# Patient Record
Sex: MALE | Race: WHITE | Employment: OTHER | URBAN - METROPOLITAN AREA
[De-identification: names, ages, dates, MRNs, and addresses within clinical notes are randomized per-mention and may not be internally consistent; named-entity substitution may affect disease eponyms.]

---

## 2018-04-03 PROBLEM — I34.0 MR (MITRAL REGURGITATION): Status: ACTIVE | Noted: 2018-04-03

## 2018-04-03 PROBLEM — C44.310 BASAL CELL CARCINOMA OF SKIN OF FACE: Status: ACTIVE | Noted: 2018-04-03

## 2018-04-03 PROBLEM — L65.9 ALOPECIA: Status: ACTIVE | Noted: 2018-04-03

## 2018-04-03 PROBLEM — E78.5 HYPERLIPEMIA: Status: ACTIVE | Noted: 2018-04-03

## 2018-04-03 PROBLEM — I35.1 AR (AORTIC REGURGITATION): Status: ACTIVE | Noted: 2018-04-03

## 2018-04-03 PROBLEM — I25.10 CORONARY ATHEROSCLEROSIS: Status: ACTIVE | Noted: 2018-04-03

## 2018-04-03 PROBLEM — H35.30 MACULAR DEGENERATION: Status: ACTIVE | Noted: 2018-04-03

## 2018-04-03 PROBLEM — K21.9 GERD (GASTROESOPHAGEAL REFLUX DISEASE): Status: ACTIVE | Noted: 2018-04-03

## 2018-04-03 PROBLEM — J45.909 ASTHMA: Status: ACTIVE | Noted: 2018-04-03

## 2018-06-28 ENCOUNTER — HOSPITAL ENCOUNTER (OUTPATIENT)
Age: 68
Discharge: HOME | End: 2018-06-30
Payer: COMMERCIAL

## 2018-06-28 LAB
ALBUMIN SERPL-MCNC: 4.4 G/DL (ref 3.5–5.2)
ALP BLD-CCNC: 57 U/L (ref 40–129)
ALT SERPL-CCNC: 24 U/L (ref 0–40)
ANION GAP SERPL CALCULATED.3IONS-SCNC: 15 MMOL/L (ref 7–16)
AST SERPL-CCNC: 35 U/L (ref 0–39)
BASOPHILS ABSOLUTE: 0.07 E9/L (ref 0–0.2)
BASOPHILS RELATIVE PERCENT: 0.8 % (ref 0–2)
BILIRUB SERPL-MCNC: 1.9 MG/DL (ref 0–1.2)
BUN BLDV-MCNC: 22 MG/DL (ref 8–23)
CALCIUM SERPL-MCNC: 9.7 MG/DL (ref 8.6–10.2)
CHLORIDE BLD-SCNC: 106 MMOL/L (ref 98–107)
CHOLESTEROL, TOTAL: 172 MG/DL (ref 0–199)
CO2: 24 MMOL/L (ref 22–29)
CREAT SERPL-MCNC: 0.9 MG/DL (ref 0.7–1.2)
EOSINOPHILS ABSOLUTE: 0.78 E9/L (ref 0.05–0.5)
EOSINOPHILS RELATIVE PERCENT: 9.2 % (ref 0–6)
GFR AFRICAN AMERICAN: >60
GFR NON-AFRICAN AMERICAN: >60 ML/MIN/1.73
GLUCOSE BLD-MCNC: 81 MG/DL (ref 74–109)
HCT VFR BLD CALC: 45.1 % (ref 37–54)
HDLC SERPL-MCNC: 44 MG/DL
HEMOGLOBIN: 14.3 G/DL (ref 12.5–16.5)
IMMATURE GRANULOCYTES #: 0.04 E9/L
IMMATURE GRANULOCYTES %: 0.5 % (ref 0–5)
LDL CHOLESTEROL CALCULATED: 118 MG/DL (ref 0–99)
LYMPHOCYTES ABSOLUTE: 1.73 E9/L (ref 1.5–4)
LYMPHOCYTES RELATIVE PERCENT: 20.4 % (ref 20–42)
MCH RBC QN AUTO: 31.6 PG (ref 26–35)
MCHC RBC AUTO-ENTMCNC: 31.7 % (ref 32–34.5)
MCV RBC AUTO: 99.6 FL (ref 80–99.9)
MONOCYTES ABSOLUTE: 0.72 E9/L (ref 0.1–0.95)
MONOCYTES RELATIVE PERCENT: 8.5 % (ref 2–12)
NEUTROPHILS ABSOLUTE: 5.14 E9/L (ref 1.8–7.3)
NEUTROPHILS RELATIVE PERCENT: 60.6 % (ref 43–80)
PDW BLD-RTO: 13.5 FL (ref 11.5–15)
PLATELET # BLD: 174 E9/L (ref 130–450)
PMV BLD AUTO: 11.2 FL (ref 7–12)
POTASSIUM SERPL-SCNC: 4.4 MMOL/L (ref 3.5–5)
PROSTATE SPECIFIC ANTIGEN: 0.82 NG/ML (ref 0–4)
RBC # BLD: 4.53 E12/L (ref 3.8–5.8)
SEDIMENTATION RATE, ERYTHROCYTE: 18 MM/HR (ref 0–15)
SODIUM BLD-SCNC: 145 MMOL/L (ref 132–146)
TOTAL PROTEIN: 7.3 G/DL (ref 6.4–8.3)
TRIGL SERPL-MCNC: 51 MG/DL (ref 0–149)
VITAMIN D 25-HYDROXY: 31 NG/ML (ref 30–100)
VLDLC SERPL CALC-MCNC: 10 MG/DL
WBC # BLD: 8.5 E9/L (ref 4.5–11.5)

## 2018-06-28 PROCEDURE — 80053 COMPREHEN METABOLIC PANEL: CPT

## 2018-06-28 PROCEDURE — G0103 PSA SCREENING: HCPCS

## 2018-06-28 PROCEDURE — 85651 RBC SED RATE NONAUTOMATED: CPT

## 2018-06-28 PROCEDURE — 80061 LIPID PANEL: CPT

## 2018-06-28 PROCEDURE — 85025 COMPLETE CBC W/AUTO DIFF WBC: CPT

## 2018-06-28 PROCEDURE — 82306 VITAMIN D 25 HYDROXY: CPT

## 2019-02-04 ENCOUNTER — HOSPITAL ENCOUNTER (OUTPATIENT)
Age: 69
Discharge: HOME | End: 2019-02-04
Payer: MEDICARE

## 2019-02-04 DIAGNOSIS — E78.00 PURE HYPERCHOLESTEROLEMIA: ICD-10-CM

## 2019-02-04 DIAGNOSIS — I10 HYPERTENSION, UNSPECIFIED TYPE: ICD-10-CM

## 2019-02-04 LAB
ALBUMIN SERPL-MCNC: 4.3 G/DL (ref 3.5–5.2)
ALP BLD-CCNC: 58 U/L (ref 40–129)
ALT SERPL-CCNC: 20 U/L (ref 0–40)
ANION GAP SERPL CALCULATED.3IONS-SCNC: 11 MMOL/L (ref 7–16)
AST SERPL-CCNC: 22 U/L (ref 0–39)
BASOPHILS ABSOLUTE: 0.09 E9/L (ref 0–0.2)
BASOPHILS RELATIVE PERCENT: 0.9 % (ref 0–2)
BILIRUB SERPL-MCNC: 1 MG/DL (ref 0–1.2)
BUN BLDV-MCNC: 24 MG/DL (ref 8–23)
CALCIUM SERPL-MCNC: 9.5 MG/DL (ref 8.6–10.2)
CHLORIDE BLD-SCNC: 106 MMOL/L (ref 98–107)
CHOLESTEROL, TOTAL: 169 MG/DL (ref 0–199)
CO2: 27 MMOL/L (ref 22–29)
CREAT SERPL-MCNC: 1.1 MG/DL (ref 0.7–1.2)
EOSINOPHILS ABSOLUTE: 1.21 E9/L (ref 0.05–0.5)
EOSINOPHILS RELATIVE PERCENT: 12.3 % (ref 0–6)
GFR AFRICAN AMERICAN: >60
GFR NON-AFRICAN AMERICAN: >60 ML/MIN/1.73
GLUCOSE BLD-MCNC: 98 MG/DL (ref 74–99)
HCT VFR BLD CALC: 44.2 % (ref 37–54)
HDLC SERPL-MCNC: 42 MG/DL
HEMOGLOBIN: 14.4 G/DL (ref 12.5–16.5)
IMMATURE GRANULOCYTES #: 0.04 E9/L
IMMATURE GRANULOCYTES %: 0.4 % (ref 0–5)
LDL CHOLESTEROL CALCULATED: 115 MG/DL (ref 0–99)
LYMPHOCYTES ABSOLUTE: 2.38 E9/L (ref 1.5–4)
LYMPHOCYTES RELATIVE PERCENT: 24.3 % (ref 20–42)
MCH RBC QN AUTO: 31.9 PG (ref 26–35)
MCHC RBC AUTO-ENTMCNC: 32.6 % (ref 32–34.5)
MCV RBC AUTO: 97.8 FL (ref 80–99.9)
MONOCYTES ABSOLUTE: 0.86 E9/L (ref 0.1–0.95)
MONOCYTES RELATIVE PERCENT: 8.8 % (ref 2–12)
NEUTROPHILS ABSOLUTE: 5.22 E9/L (ref 1.8–7.3)
NEUTROPHILS RELATIVE PERCENT: 53.3 % (ref 43–80)
PDW BLD-RTO: 13.1 FL (ref 11.5–15)
PLATELET # BLD: 169 E9/L (ref 130–450)
PMV BLD AUTO: 10.3 FL (ref 7–12)
POTASSIUM SERPL-SCNC: 4.2 MMOL/L (ref 3.5–5)
RBC # BLD: 4.52 E12/L (ref 3.8–5.8)
SODIUM BLD-SCNC: 144 MMOL/L (ref 132–146)
TOTAL PROTEIN: 6.9 G/DL (ref 6.4–8.3)
TRIGL SERPL-MCNC: 62 MG/DL (ref 0–149)
VLDLC SERPL CALC-MCNC: 12 MG/DL
WBC # BLD: 9.8 E9/L (ref 4.5–11.5)

## 2019-02-04 PROCEDURE — 80053 COMPREHEN METABOLIC PANEL: CPT

## 2019-02-04 PROCEDURE — 85025 COMPLETE CBC W/AUTO DIFF WBC: CPT

## 2019-02-04 PROCEDURE — 80061 LIPID PANEL: CPT

## 2019-02-04 PROCEDURE — 36415 COLL VENOUS BLD VENIPUNCTURE: CPT

## 2019-05-05 ENCOUNTER — HOSPITAL ENCOUNTER (EMERGENCY)
Age: 69
Discharge: HOME | End: 2019-05-05
Payer: MEDICARE

## 2019-05-05 VITALS
BODY MASS INDEX: 24.33 KG/M2 | SYSTOLIC BLOOD PRESSURE: 131 MMHG | OXYGEN SATURATION: 93 % | RESPIRATION RATE: 16 BRPM | WEIGHT: 155 LBS | TEMPERATURE: 97.9 F | HEIGHT: 67 IN | DIASTOLIC BLOOD PRESSURE: 67 MMHG | HEART RATE: 83 BPM

## 2019-05-05 DIAGNOSIS — S51.812A LACERATION OF SKIN OF LEFT FOREARM, INITIAL ENCOUNTER: Primary | ICD-10-CM

## 2019-05-05 PROCEDURE — 99282 EMERGENCY DEPT VISIT SF MDM: CPT

## 2019-05-05 PROCEDURE — 12002 RPR S/N/AX/GEN/TRNK2.6-7.5CM: CPT

## 2019-05-05 RX ORDER — SULFAMETHOXAZOLE AND TRIMETHOPRIM 800; 160 MG/1; MG/1
1 TABLET ORAL 2 TIMES DAILY
Qty: 20 TABLET | Refills: 0 | Status: SHIPPED | OUTPATIENT
Start: 2019-05-05 | End: 2019-12-18 | Stop reason: ALTCHOICE

## 2019-05-05 RX ORDER — SULFAMETHOXAZOLE AND TRIMETHOPRIM 800; 160 MG/1; MG/1
1 TABLET ORAL ONCE
Status: DISCONTINUED | OUTPATIENT
Start: 2019-05-05 | End: 2019-05-05 | Stop reason: HOSPADM

## 2019-05-05 ASSESSMENT — PAIN SCALES - GENERAL: PAINLEVEL_OUTOF10: 5

## 2019-05-05 ASSESSMENT — PAIN DESCRIPTION - PAIN TYPE: TYPE: ACUTE PAIN

## 2019-05-05 ASSESSMENT — PAIN DESCRIPTION - LOCATION: LOCATION: ARM

## 2019-05-05 NOTE — ED PROVIDER NOTES
REPAIR  PROCEDURE NOTE:  Unless otherwise indicated, this procedure was done or directly supervised by the ED attending. Laceration #: 1. Location: left FA  Length: 6cm. flap  The wound area was cleansed with chlorhexidine gluconate and draped in a sterile fashion. Copious irrigation and care meticulously to make sure that his arm hair which is copious was out of the way. The wound area was anesthetized with Lidocaine 1% without epinephrine. WOUND COMPLEXITY:    Debridement: None. Undermining: None. Wound Margins Revised: None. Flaps Aligned: yes. The wound was explored with the following results no foreign body or tendon injury seen. The wound was closed with 5-0 Ethilon using interrupted sutures to anchor the large flap With Dermabond in between them  Dressing:  a sterile dressing and a bandage was placed. Total number suture: 8        COUNSELING:   I have spoken with the patient and discussed todays results, in addition to providing specific details for the plan of care and counseling regarding the diagnosis and prognosis.     --------------------------------------- IMPRESSION & DISPOSITION --------------------------------     IMPRESSION(s):  1. Laceration of skin of left forearm, initial encounter          DISPOSITION:  Disposition: Discharge to home. Patient condition is good. NOTE: This report was transcribed using voice recognition software. Every effort was made to ensure accuracy; however, inadvertent computerized transcription errors may be present.              VAL Hernández - CNP  05/05/19 8639

## 2020-02-02 PROBLEM — I71.20 THORACIC AORTIC ANEURYSM WITHOUT RUPTURE: Status: ACTIVE | Noted: 2020-02-02

## 2020-07-08 ENCOUNTER — APPOINTMENT (OUTPATIENT)
Dept: GENERAL RADIOLOGY | Age: 70
End: 2020-07-08
Payer: MEDICARE

## 2020-07-08 ENCOUNTER — HOSPITAL ENCOUNTER (EMERGENCY)
Age: 70
Discharge: ANOTHER HOSPITAL | End: 2020-07-08
Attending: EMERGENCY MEDICINE
Payer: MEDICARE

## 2020-07-08 VITALS
OXYGEN SATURATION: 97 % | BODY MASS INDEX: 24.59 KG/M2 | TEMPERATURE: 99 F | RESPIRATION RATE: 16 BRPM | HEART RATE: 124 BPM | SYSTOLIC BLOOD PRESSURE: 93 MMHG | DIASTOLIC BLOOD PRESSURE: 66 MMHG | WEIGHT: 157 LBS

## 2020-07-08 LAB
ANION GAP SERPL CALCULATED.3IONS-SCNC: 14 MMOL/L (ref 7–16)
BASOPHILS ABSOLUTE: 0.09 E9/L (ref 0–0.2)
BASOPHILS RELATIVE PERCENT: 0.7 % (ref 0–2)
BILIRUBIN URINE: NEGATIVE
BLOOD, URINE: NEGATIVE
BUN BLDV-MCNC: 14 MG/DL (ref 8–23)
CALCIUM SERPL-MCNC: 8.9 MG/DL (ref 8.6–10.2)
CHLORIDE BLD-SCNC: 95 MMOL/L (ref 98–107)
CLARITY: CLEAR
CO2: 25 MMOL/L (ref 22–29)
COLOR: YELLOW
CREAT SERPL-MCNC: 1 MG/DL (ref 0.7–1.2)
EOSINOPHILS ABSOLUTE: 0.88 E9/L (ref 0.05–0.5)
EOSINOPHILS RELATIVE PERCENT: 7 % (ref 0–6)
GFR AFRICAN AMERICAN: >60
GFR NON-AFRICAN AMERICAN: >60 ML/MIN/1.73
GLUCOSE BLD-MCNC: 107 MG/DL (ref 74–99)
GLUCOSE URINE: NEGATIVE MG/DL
HCT VFR BLD CALC: 33.7 % (ref 37–54)
HEMOGLOBIN: 10.6 G/DL (ref 12.5–16.5)
IMMATURE GRANULOCYTES #: 0.14 E9/L
IMMATURE GRANULOCYTES %: 1.1 % (ref 0–5)
KETONES, URINE: NEGATIVE MG/DL
LEUKOCYTE ESTERASE, URINE: NEGATIVE
LYMPHOCYTES ABSOLUTE: 1.51 E9/L (ref 1.5–4)
LYMPHOCYTES RELATIVE PERCENT: 12.1 % (ref 20–42)
MCH RBC QN AUTO: 30.5 PG (ref 26–35)
MCHC RBC AUTO-ENTMCNC: 31.5 % (ref 32–34.5)
MCV RBC AUTO: 97.1 FL (ref 80–99.9)
MONOCYTES ABSOLUTE: 1.37 E9/L (ref 0.1–0.95)
MONOCYTES RELATIVE PERCENT: 10.9 % (ref 2–12)
NEUTROPHILS ABSOLUTE: 8.53 E9/L (ref 1.8–7.3)
NEUTROPHILS RELATIVE PERCENT: 68.2 % (ref 43–80)
NITRITE, URINE: NEGATIVE
PDW BLD-RTO: 13.2 FL (ref 11.5–15)
PH UA: 7 (ref 5–9)
PLATELET # BLD: 327 E9/L (ref 130–450)
PMV BLD AUTO: 9.9 FL (ref 7–12)
POTASSIUM REFLEX MAGNESIUM: 4.6 MMOL/L (ref 3.5–5)
PRO-BNP: 1662 PG/ML (ref 0–125)
PROTEIN UA: NEGATIVE MG/DL
RBC # BLD: 3.47 E12/L (ref 3.8–5.8)
SODIUM BLD-SCNC: 134 MMOL/L (ref 132–146)
SPECIFIC GRAVITY UA: 1.01 (ref 1–1.03)
TROPONIN: 0.03 NG/ML (ref 0–0.03)
UROBILINOGEN, URINE: 0.2 E.U./DL
WBC # BLD: 12.5 E9/L (ref 4.5–11.5)

## 2020-07-08 PROCEDURE — 84484 ASSAY OF TROPONIN QUANT: CPT

## 2020-07-08 PROCEDURE — 80048 BASIC METABOLIC PNL TOTAL CA: CPT

## 2020-07-08 PROCEDURE — 87040 BLOOD CULTURE FOR BACTERIA: CPT

## 2020-07-08 PROCEDURE — 85025 COMPLETE CBC W/AUTO DIFF WBC: CPT

## 2020-07-08 PROCEDURE — 99285 EMERGENCY DEPT VISIT HI MDM: CPT

## 2020-07-08 PROCEDURE — 6370000000 HC RX 637 (ALT 250 FOR IP): Performed by: EMERGENCY MEDICINE

## 2020-07-08 PROCEDURE — 93005 ELECTROCARDIOGRAM TRACING: CPT | Performed by: EMERGENCY MEDICINE

## 2020-07-08 PROCEDURE — 71045 X-RAY EXAM CHEST 1 VIEW: CPT

## 2020-07-08 PROCEDURE — 81003 URINALYSIS AUTO W/O SCOPE: CPT

## 2020-07-08 PROCEDURE — 2500000003 HC RX 250 WO HCPCS: Performed by: EMERGENCY MEDICINE

## 2020-07-08 PROCEDURE — 83880 ASSAY OF NATRIURETIC PEPTIDE: CPT

## 2020-07-08 PROCEDURE — 96376 TX/PRO/DX INJ SAME DRUG ADON: CPT

## 2020-07-08 PROCEDURE — 2580000003 HC RX 258: Performed by: EMERGENCY MEDICINE

## 2020-07-08 PROCEDURE — 96374 THER/PROPH/DIAG INJ IV PUSH: CPT

## 2020-07-08 RX ORDER — MAGNESIUM OXIDE 400 MG/1
400 TABLET ORAL 2 TIMES DAILY
COMMUNITY
End: 2020-08-18 | Stop reason: ALTCHOICE

## 2020-07-08 RX ORDER — OXYCODONE HYDROCHLORIDE 5 MG/1
5 TABLET ORAL EVERY 6 HOURS PRN
COMMUNITY
End: 2020-07-14 | Stop reason: ALTCHOICE

## 2020-07-08 RX ORDER — ACETAMINOPHEN 500 MG
1000 TABLET ORAL ONCE
Status: COMPLETED | OUTPATIENT
Start: 2020-07-08 | End: 2020-07-08

## 2020-07-08 RX ORDER — COLCHICINE 0.6 MG/1
0.6 TABLET ORAL DAILY
COMMUNITY
End: 2020-07-14

## 2020-07-08 RX ORDER — METOPROLOL TARTRATE 5 MG/5ML
2.5 INJECTION INTRAVENOUS ONCE
Status: COMPLETED | OUTPATIENT
Start: 2020-07-08 | End: 2020-07-08

## 2020-07-08 RX ORDER — 0.9 % SODIUM CHLORIDE 0.9 %
500 INTRAVENOUS SOLUTION INTRAVENOUS ONCE
Status: COMPLETED | OUTPATIENT
Start: 2020-07-08 | End: 2020-07-08

## 2020-07-08 RX ORDER — ATORVASTATIN CALCIUM 10 MG/1
10 TABLET, FILM COATED ORAL NIGHTLY
COMMUNITY

## 2020-07-08 RX ORDER — ASPIRIN 81 MG/1
81 TABLET, CHEWABLE ORAL DAILY
COMMUNITY

## 2020-07-08 RX ORDER — ACETAMINOPHEN 500 MG
1000 TABLET ORAL EVERY 4 HOURS PRN
COMMUNITY
End: 2020-07-14

## 2020-07-08 RX ADMIN — METOPROLOL TARTRATE 2.5 MG: 5 INJECTION INTRAVENOUS at 11:40

## 2020-07-08 RX ADMIN — SODIUM CHLORIDE 500 ML: 9 INJECTION, SOLUTION INTRAVENOUS at 11:40

## 2020-07-08 RX ADMIN — METOPROLOL TARTRATE 2.5 MG: 5 INJECTION INTRAVENOUS at 13:13

## 2020-07-08 RX ADMIN — METOPROLOL TARTRATE 2.5 MG: 5 INJECTION INTRAVENOUS at 17:46

## 2020-07-08 RX ADMIN — ACETAMINOPHEN 1000 MG: 500 TABLET ORAL at 18:57

## 2020-07-08 ASSESSMENT — ENCOUNTER SYMPTOMS
BLOOD IN STOOL: 0
COUGH: 0
SINUS PRESSURE: 0
CONSTIPATION: 1
NAUSEA: 0
DIARRHEA: 0
EYE PAIN: 0
ABDOMINAL DISTENTION: 0
SORE THROAT: 0
VOMITING: 0
RHINORRHEA: 0
SHORTNESS OF BREATH: 0
EYE REDNESS: 0
ABDOMINAL PAIN: 0
WHEEZING: 0
EYE DISCHARGE: 0
BACK PAIN: 0

## 2020-07-08 ASSESSMENT — PAIN SCALES - GENERAL
PAINLEVEL_OUTOF10: 10
PAINLEVEL_OUTOF10: 3

## 2020-07-08 NOTE — ED PROVIDER NOTES
HPI  This is a 82 yo M with a PMHx significant for HTN, HLD, valvular disease (s/p replacement 6/2020), thoracic aortic aneurysm (s/p repair 6/2020), asthma and RA who presents with ongoing palpitations that have been refractory to treatment with beta blockers. The patient states that he has been in A Fib with RVR since his surgeries last month. He states he did not have A Fib prior to that. He states he was placed on Eliquis and Metoprolol which he has taken faithfully. The patient states he has been in contact with his physicians at [facility] (site of his surgeries) almost daily. Two days ago he states they told him to stop taking the Metoprolol because his heart rate was in the 130s-140s and was hypotensive with SBPs in the 80s. When he spoke to them today and told them those issues were still ongoing they told him to go to the nearest emergency room, prompting his visit today. The patient also endorses feeling fatigued and somewhat weak. The patient denies recent trauma, fever, chills, HA, dizziness, vision changes, congestion, rhinorrhea, neck pain, chest pain, hx of MI, SOB, cough, wheezing, abdominal pain, N/V/D/C, hematochezia, melena, dysuria, hematuria and paresthesias. The patient is currently taking Eliquis. The patient is a former smoker. He denies all alcohol and illicit drug use. The history is provided by the patient. Review of Systems   Constitutional: Positive for fatigue. Negative for chills, diaphoresis and fever. HENT: Negative for congestion, ear pain, postnasal drip, rhinorrhea, sinus pressure and sore throat. Eyes: Negative for pain, discharge and redness. Respiratory: Negative for cough, shortness of breath and wheezing. Cardiovascular: Positive for palpitations. Negative for chest pain and leg swelling. Gastrointestinal: Positive for constipation (intermittent). Negative for abdominal distention, abdominal pain, blood in stool, diarrhea, nausea and vomiting. Genitourinary: Negative for difficulty urinating, dysuria, frequency and hematuria. Musculoskeletal: Negative for arthralgias, back pain, myalgias and neck pain. Skin: Positive for wound (surgical). Negative for rash. Neurological: Negative for dizziness, syncope, facial asymmetry, speech difficulty, light-headedness, numbness and headaches. Hematological: Negative for adenopathy. All other systems reviewed and are negative. Physical Exam  Vitals signs and nursing note reviewed. Constitutional:       General: He is awake. He is not in acute distress. Appearance: He is ill-appearing. He is not diaphoretic. HENT:      Head: Normocephalic and atraumatic. Right Ear: External ear normal.      Left Ear: External ear normal.      Nose: Nose normal. No congestion or rhinorrhea. Mouth/Throat:      Mouth: Mucous membranes are dry. Pharynx: Oropharynx is clear. No oropharyngeal exudate or posterior oropharyngeal erythema. Eyes:      General: No scleral icterus. Right eye: No discharge. Left eye: No discharge. Extraocular Movements: Extraocular movements intact. Conjunctiva/sclera: Conjunctivae normal.      Pupils: Pupils are equal, round, and reactive to light. Neck:      Musculoskeletal: Normal range of motion and neck supple. No neck rigidity or muscular tenderness. Cardiovascular:      Rate and Rhythm: Tachycardia present. Rhythm irregular. Heart sounds: Murmur (soft AJMIE, II/VI, secondary to recent valve replacement) present. No friction rub. No gallop. Comments: Upper extremity and lower extremity distal pulses intact bilaterally +2/4  Pulmonary:      Effort: Pulmonary effort is normal. No respiratory distress. Breath sounds: Normal breath sounds. No wheezing, rhonchi or rales. Chest:      Chest wall: Tenderness (to mild palpation in the areas immediately adjacent to midline scar) present.    Abdominal:      General: Bowel sounds are normal. There is no distension. Palpations: Abdomen is soft. Tenderness: There is no abdominal tenderness. There is no guarding. Musculoskeletal: Normal range of motion. General: No tenderness or deformity. Right lower leg: No edema. Left lower leg: No edema. Lymphadenopathy:      Cervical: No cervical adenopathy. Skin:     General: Skin is warm and dry. Capillary Refill: Capillary refill takes less than 2 seconds. Findings: No erythema or rash. Comments: ~10 cm surgical wound/scar located in the midline of the chest - site of recent sternotomy - appears to be healing well with no drainage and no notable erythema   Neurological:      General: No focal deficit present. Mental Status: He is alert and oriented to person, place, and time. Sensory: No sensory deficit. Motor: No weakness. Coordination: Coordination normal.        Procedures:  No procedures performed. --------------------------------------------- PAST HISTORY ---------------------------------------------  Past Medical History:  has a past medical history of Aneurysm artery, pulmonary ([address] Asthma, Deviated septum, Hypercholesterolemia, Hypertension, Kidney stone, Restless leg syndrome, and Rheumatoid arthritis(714.0). Past Surgical History:  has a past surgical history that includes hernia repair (Left); cardiovascular stress test (1994); Tonsillectomy; hernia repair; Cataract removal (Bilateral, 2002); Colonoscopy; Diagnostic Cardiac Cath Lab Procedure; Vasectomy; Adenoidectomy; cyst removal; and skin biopsy (03/27/2018). Social History:  reports that he quit smoking about 44 years ago. He started smoking about 54 years ago. He has a 5.00 pack-year smoking history. He has never used smokeless tobacco. He reports that he does not drink alcohol or use drugs. Family History: family history is not on file. He was adopted.      The patients home medications have been reviewed.     Allergies: Statins [statins]    -------------------------------------------------- RESULTS -------------------------------------------------    LABS:  Results for orders placed or performed during the hospital encounter of 07/08/20   CBC Auto Differential   Result Value Ref Range    WBC 12.5 (H) 4.5 - 11.5 E9/L    RBC 3.47 (L) 3.80 - 5.80 E12/L    Hemoglobin 10.6 (L) 12.5 - 16.5 g/dL    Hematocrit 33.7 (L) 37.0 - 54.0 %    MCV 97.1 80.0 - 99.9 fL    MCH 30.5 26.0 - 35.0 pg    MCHC 31.5 (L) 32.0 - 34.5 %    RDW 13.2 11.5 - 15.0 fL    Platelets 606 029 - 941 E9/L    MPV 9.9 7.0 - 12.0 fL    Neutrophils % 68.2 43.0 - 80.0 %    Immature Granulocytes % 1.1 0.0 - 5.0 %    Lymphocytes % 12.1 (L) 20.0 - 42.0 %    Monocytes % 10.9 2.0 - 12.0 %    Eosinophils % 7.0 (H) 0.0 - 6.0 %    Basophils % 0.7 0.0 - 2.0 %    Neutrophils Absolute 8.53 (H) 1.80 - 7.30 E9/L    Immature Granulocytes # 0.14 E9/L    Lymphocytes Absolute 1.51 1.50 - 4.00 E9/L    Monocytes Absolute 1.37 (H) 0.10 - 0.95 E9/L    Eosinophils Absolute 0.88 (H) 0.05 - 0.50 E9/L    Basophils Absolute 0.09 0.00 - 0.20 K5/L   Basic Metabolic Panel w/ Reflex to MG   Result Value Ref Range    Sodium 134 132 - 146 mmol/L    Potassium reflex Magnesium 4.6 3.5 - 5.0 mmol/L    Chloride 95 (L) 98 - 107 mmol/L    CO2 25 22 - 29 mmol/L    Anion Gap 14 7 - 16 mmol/L    Glucose 107 (H) 74 - 99 mg/dL    BUN 14 8 - 23 mg/dL    CREATININE 1.0 0.7 - 1.2 mg/dL    GFR Non-African American >60 >=60 mL/min/1.73    GFR African American >60     Calcium 8.9 8.6 - 10.2 mg/dL   Brain Natriuretic Peptide   Result Value Ref Range    Pro-BNP 1,662 (H) 0 - 125 pg/mL   Troponin   Result Value Ref Range    Troponin 0.03 0.00 - 0.03 ng/mL   Urinalysis, reflex to microscopic   Result Value Ref Range    Color, UA Yellow Straw/Yellow    Clarity, UA Clear Clear    Glucose, Ur Negative Negative mg/dL    Bilirubin Urine Negative Negative    Ketones, Urine Negative Negative mg/dL    Specific below.    ED Course:    ED Course as of Jul 08 2030 Wed Jul 08, 2020   1108 This resident in to evaluate the patient. [ML]   1440 On reevaluation, the patient is resting comfortably in bed. I discussed our plan to transfer him back to [facility] which the patient and his wife are in agreement with. Call was placed to the patient's CT surgeon, Dr. Julee Neely. [ML]   1536 Discussed the case with Dr. Ld Multani of cardiology at [facility]. He agrees to accept the patient back to the original service that he was recently discharged from, CT surgery. Transfer center is aware. The patient will go by ambulance. He will be transferred when a bed becomes available. [ML]   3265 On reevaluation, the patient is resting comfortably in bed. I informed him that we discussed the case with cardiology at the [facility] and they agreed to accept him back. He is aware that he cannot be transferred until the [facility] has verified bed in place for him. He requested something to eat and drink which we will get for him now. [ML]      ED Course User Index  [ML] [place], [place]       8571: All results were discussed with the patient and I have provided specific details regarding the plan to admit the patient. The patient was without objective evidence of hemodynamic instability on transfer, though he remained in A Fib w/RVR. He was seen in the emergency department by myself and the assigned attending physician, Dr. Josie Savage, who agreed with the assessment, plan and decision to admit as laid out herein. The patient verbalized an understanding and agreement with the plan for transfer and admission to outside hospital. All questions were answered and the patient was deemed to be in stable condition at the time of transfer. MDM:  Patient presented from home with a primary complaint of palpitations. Recent open heart surgery at [facility] with patient in A Fib since the procedure's conclusion per notes and his recollection.  On arrival, he was tachycardic with remaining vital signs within normal limits. Physical exam was as documented above. DDX included, but was not limited to: PAF, ACS, PE, PTX, PNA, Sepsis, wound infection    Given the patient's clinical presentation, history and exam, the decision was made to further evaluate his complaints with EKG, labs and imaging. The patient's work-up revealed a slight leukocytosis, mild anemia and a modest increase in his proBNP. CXR noted. EKG was irregularly irregular as noted above. Trop within normal limits. The patient was given multiple doses of IV Lopressor, IVF and tylenol while in the ER with minimal improvement in his HR. Case was discussed with cardiology at [facility] who agreed the patient should be transferred there for further evaluation and management. The patient remained stable throughout his time in the ER. This patient's ED course included: a personal history and physicial examination, re-evaluation prior to disposition, multiple bedside re-evaluations, IV medications, cardiac monitoring and continuous pulse oximetry    Diagnosis:  1. Atrial fibrillation with RVR (HCC)        Disposition:  Patient's disposition: Transfer to [facility]  Mode of Transport: Ambulance  Patient's condition is stable.        Joann Barber DO  Resident  07/08/20 2304

## 2020-07-08 NOTE — ED NOTES
[facility]  E60 bed [address] [facility] Sp Castillo RN  07/08/20 9894

## 2020-07-09 LAB
EKG ATRIAL RATE: 129 BPM
EKG P AXIS: 44 DEGREES
EKG P-R INTERVAL: 180 MS
EKG Q-T INTERVAL: 312 MS
EKG QRS DURATION: 96 MS
EKG QTC CALCULATION (BAZETT): 457 MS
EKG R AXIS: -34 DEGREES
EKG T AXIS: 143 DEGREES
EKG VENTRICULAR RATE: 129 BPM

## 2020-07-09 PROCEDURE — 93010 ELECTROCARDIOGRAM REPORT: CPT | Performed by: INTERNAL MEDICINE

## 2020-07-13 LAB
BLOOD CULTURE, ROUTINE: NORMAL
CULTURE, BLOOD 2: NORMAL

## 2020-07-14 ENCOUNTER — HOSPITAL ENCOUNTER (OUTPATIENT)
Dept: GENERAL RADIOLOGY | Age: 70
Discharge: HOME | End: 2020-07-16
Payer: MEDICARE

## 2020-07-14 ENCOUNTER — HOSPITAL ENCOUNTER (OUTPATIENT)
Age: 70
Discharge: HOME | End: 2020-07-16
Payer: MEDICARE

## 2020-07-14 PROCEDURE — 71046 X-RAY EXAM CHEST 2 VIEWS: CPT

## 2020-07-23 ENCOUNTER — HOSPITAL ENCOUNTER (OUTPATIENT)
Age: 70
Discharge: HOME | End: 2020-07-23
Payer: MEDICARE

## 2020-07-23 PROCEDURE — 87088 URINE BACTERIA CULTURE: CPT

## 2020-07-25 LAB — URINE CULTURE, ROUTINE: NORMAL

## 2020-07-29 ENCOUNTER — HOSPITAL ENCOUNTER (OUTPATIENT)
Age: 70
Discharge: HOME | End: 2020-07-31
Payer: MEDICARE

## 2020-07-29 PROCEDURE — 87088 URINE BACTERIA CULTURE: CPT

## 2020-08-01 LAB — URINE CULTURE, ROUTINE: NORMAL

## 2020-08-11 ENCOUNTER — TELEPHONE (OUTPATIENT)
Dept: CARDIAC REHAB | Age: 70
End: 2020-08-11

## 2020-08-18 ENCOUNTER — HOSPITAL ENCOUNTER (OUTPATIENT)
Dept: CARDIAC REHAB | Age: 70
Setting detail: THERAPIES SERIES
Discharge: HOME | End: 2020-08-18
Payer: MEDICARE

## 2020-08-18 VITALS
SYSTOLIC BLOOD PRESSURE: 110 MMHG | RESPIRATION RATE: 16 BRPM | BODY MASS INDEX: 24.33 KG/M2 | OXYGEN SATURATION: 98 % | WEIGHT: 142.5 LBS | HEART RATE: 60 BPM | DIASTOLIC BLOOD PRESSURE: 63 MMHG | HEIGHT: 64 IN

## 2020-08-18 RX ORDER — TAMSULOSIN HYDROCHLORIDE 0.4 MG/1
0.4 CAPSULE ORAL NIGHTLY
COMMUNITY

## 2020-08-18 ASSESSMENT — PATIENT HEALTH QUESTIONNAIRE - PHQ9: SUM OF ALL RESPONSES TO PHQ QUESTIONS 1-9: 1

## 2020-08-21 ENCOUNTER — HOSPITAL ENCOUNTER (OUTPATIENT)
Dept: CARDIAC REHAB | Age: 70
Setting detail: THERAPIES SERIES
Discharge: HOME | End: 2020-08-21
Payer: MEDICARE

## 2020-08-21 PROCEDURE — 93798 PHYS/QHP OP CAR RHAB W/ECG: CPT

## 2020-08-24 ENCOUNTER — HOSPITAL ENCOUNTER (OUTPATIENT)
Dept: CARDIAC REHAB | Age: 70
Setting detail: THERAPIES SERIES
Discharge: HOME | End: 2020-08-24
Payer: MEDICARE

## 2020-08-24 PROCEDURE — 93798 PHYS/QHP OP CAR RHAB W/ECG: CPT

## 2020-08-26 ENCOUNTER — HOSPITAL ENCOUNTER (OUTPATIENT)
Dept: CARDIAC REHAB | Age: 70
Setting detail: THERAPIES SERIES
Discharge: HOME | End: 2020-08-26
Payer: MEDICARE

## 2020-08-26 PROCEDURE — 93798 PHYS/QHP OP CAR RHAB W/ECG: CPT

## 2020-08-28 ENCOUNTER — HOSPITAL ENCOUNTER (OUTPATIENT)
Dept: CARDIAC REHAB | Age: 70
Setting detail: THERAPIES SERIES
Discharge: HOME | End: 2020-08-28
Payer: MEDICARE

## 2020-08-28 PROCEDURE — 93798 PHYS/QHP OP CAR RHAB W/ECG: CPT

## 2020-08-31 ENCOUNTER — HOSPITAL ENCOUNTER (OUTPATIENT)
Dept: CARDIAC REHAB | Age: 70
Setting detail: THERAPIES SERIES
Discharge: HOME | End: 2020-08-31
Payer: MEDICARE

## 2020-08-31 PROCEDURE — 93798 PHYS/QHP OP CAR RHAB W/ECG: CPT

## 2020-09-02 ENCOUNTER — HOSPITAL ENCOUNTER (OUTPATIENT)
Dept: CARDIAC REHAB | Age: 70
Setting detail: THERAPIES SERIES
Discharge: HOME | End: 2020-09-02
Payer: MEDICARE

## 2020-09-02 PROCEDURE — 93798 PHYS/QHP OP CAR RHAB W/ECG: CPT

## 2020-09-04 ENCOUNTER — HOSPITAL ENCOUNTER (OUTPATIENT)
Dept: CARDIAC REHAB | Age: 70
Setting detail: THERAPIES SERIES
Discharge: HOME | End: 2020-09-04
Payer: MEDICARE

## 2020-09-04 PROCEDURE — 93798 PHYS/QHP OP CAR RHAB W/ECG: CPT

## 2020-09-07 ENCOUNTER — APPOINTMENT (OUTPATIENT)
Dept: CARDIAC REHAB | Age: 70
End: 2020-09-07
Payer: MEDICARE

## 2020-09-08 ENCOUNTER — TELEPHONE (OUTPATIENT)
Dept: CARDIAC REHAB | Age: 70
End: 2020-09-08

## 2020-09-08 NOTE — TELEPHONE ENCOUNTER
9/8/2020 9:03 am Nutrition: Confirmed appointment scheduled for 9/14/2020 at 9:00 am. Will remain available.  Electronically signed by Amy Little RD, ZANDER on 9/8/20 at 9:03 AM EDT

## 2020-09-09 ENCOUNTER — HOSPITAL ENCOUNTER (OUTPATIENT)
Dept: CARDIAC REHAB | Age: 70
Setting detail: THERAPIES SERIES
Discharge: HOME | End: 2020-09-09
Payer: MEDICARE

## 2020-09-09 PROCEDURE — 93798 PHYS/QHP OP CAR RHAB W/ECG: CPT

## 2020-09-11 ENCOUNTER — HOSPITAL ENCOUNTER (OUTPATIENT)
Dept: CARDIAC REHAB | Age: 70
Setting detail: THERAPIES SERIES
Discharge: HOME | End: 2020-09-11
Payer: MEDICARE

## 2020-09-11 PROCEDURE — 93798 PHYS/QHP OP CAR RHAB W/ECG: CPT

## 2020-09-14 ENCOUNTER — HOSPITAL ENCOUNTER (OUTPATIENT)
Dept: CARDIAC REHAB | Age: 70
Setting detail: THERAPIES SERIES
Discharge: HOME | End: 2020-09-14
Payer: MEDICARE

## 2020-09-14 VITALS — WEIGHT: 138.2 LBS | HEIGHT: 64 IN | BODY MASS INDEX: 23.6 KG/M2

## 2020-09-14 PROCEDURE — 93798 PHYS/QHP OP CAR RHAB W/ECG: CPT

## 2020-09-14 NOTE — PROGRESS NOTES
Outpatient Dietitian Assessment  9/14/2020    Lesly Butt 78 y.o. male    PCP:   Nayely Stevenson MD    Assessment:  Subjective data: The patient in for nutrition counseling. Patient reports he has lost weight, lost fat and losstmuscle since surgery but is trying to eat healthy and drinking one Ensure daily ( previously on 8 ounces bid while at [facility])     Objective data:   Temperature: 98.1 degrees Farenheit. Negative for fever. Weight History:Patient reports usual weight 155 pounds. Patient has lost 16 pounds over 3 months( 10% weight loss over 3 months). Weight loss unintentional. Weight stabilization is goal then gradually regain muscle. Appearance: short stature; mild muscle wasting in forearms and mild wasting in lower extremities     Comparative Standards:  Resting Energy Expenditure: 1300 calories/ Estimated total calories; 1500 to 1700 calories plus additional calories for weight gain. ( [place], actual)     Pertinent Past Medical/Surgical History:Hypertension, Hypercholesterolemia, Coronary atherosclerosis, Mitral Regurgitation, GERD, Hyperlipidemia, Macular Degeneration, cataract removal Asthma, Aortic Regurgitation, Heart Valve Disease, Pulmonary Emphysema, Thoracic aortic aneurysm without rupture, ( from [facility] COPD, lung nodules,6/16/2020: BiobentallModerate  (#27 Inspiris + #30 straight graft), Total arch repair/single anastomosis B-SAFER procedure w/ 40x10 CTAG antegrade TEVAR, 13x2.5 viabhan to LSCA, 8x2.5 viabhan to LLCA; PFO closure, ligation of SUE  6/26/2020: Chest exploration, Hematoma evacuation, and Drainage of Left pleural effusion  7/8/2020: Readmitted with afib w/ RVR from [facility]. Drug/Nutrient Interactions:On Lipitor; avoids grapefruit.      Alcohol use:none     Diet at home: no added salt    Food allergies/Intolerances:none   Appetite:now good( 3 weeks ago was decreased)     Cultural, Mu-ism or ethnic food preferences:none Difficulty Chewing and/or Swallowing:none     Own teeth/Dentures/Implants:own teeth     Adequate Funds for Food:yes    Grocery Shops: Wife grocery shops at [address] [place] [address],[address] Floor and [employer] food warehouse. Cooking Skills: Wife cooks. 24 hour Dietary Recall:    Breakfast:9:30 am, at home 1 cup Shredded Wheat with blueberries and skim milk,  1 cup regular coffee with regular half and half and 1 tsp sugar  AM snack: none  Lunch: none  PM snack: none  Dinner: 6:00 pm, At home,  1 whole (breast) chicken caprresi with grape tomatoes, 1/12 cup  [place] cubed potatoes with onions and peppers, 12 ounces filtered water   Evening snack:9:00- 9:30 pm,  At home,  1 homemade chocolate covered oreo cookies     Frequency of Eating at [employer] during week: prior to Covid- 19   Special occasion ( birthday)  since covid-19 use carry out only if needed    Frequency of Eating at [facility] during week: Prior to Covid-19  , infrequently; since Covid-19 -not eating fast food     Frequency and Type of Caffeine Consumption on a daily basis:1 cup regular coffee daily     Water consumption on a daily basis:four , 12 ounces glasses ( 48 ounces/day)     Frequency of Pop/Soda consumed:none    Ht Readings from Last 1 Encounters:   09/14/20 5' 3.5\" (1.613 m)     Wt Readings from Last 3 Encounters:   09/14/20 138 lb 3.2 oz (62.7 kg)   08/18/20 142 lb 8 oz (64.6 kg)   08/17/20 138 lb 9.6 oz (62.9 kg)     Body mass index is 24.1 kg/m². Waist circumference: deferred  Body Fat %:deferred    Diagnosis:  Moderate malnutrition in the context of acute illness/injury related to cardiac dysfunction  as evidenced by greater than 7.5 % weight loss in 3 months, mild subcutaneous fat loss, mild muscle loss. Intervention:  RD 1: 1 visit   Meet estimated needs. Maintain weight. Adequate hydration. Increased knowledge. Improved compliance. Follow individualized diet plan.      Education provided:Medical Nutrition Therapy Heart Healthy Low Sodium information, My Plate, Meal Planning Made Easy, Reaching Nutrition Goals, 3 day food log. Interdisciplinary Treatment Plan (ITP) note:Patient is a phase II participant and ITP done. Monitoring/Evaluation:  RD will monitor PO intake and weight data as available. The goals set by the patient are to maintain weight within 1 to 2 pounds by following heart healthy eating habits, complete 3 day food log, increase water consumption  And increase Ensure to bid. The patient will be followed on October 12, 2020. Yovana Salvador M.A.,R.GM., L.D. Contact information: [phone].

## 2020-09-16 ENCOUNTER — HOSPITAL ENCOUNTER (OUTPATIENT)
Dept: CARDIAC REHAB | Age: 70
Setting detail: THERAPIES SERIES
Discharge: HOME | End: 2020-09-16
Payer: MEDICARE

## 2020-09-16 PROCEDURE — 93798 PHYS/QHP OP CAR RHAB W/ECG: CPT

## 2020-09-18 ENCOUNTER — HOSPITAL ENCOUNTER (OUTPATIENT)
Dept: CARDIAC REHAB | Age: 70
Setting detail: THERAPIES SERIES
Discharge: HOME | End: 2020-09-18
Payer: MEDICARE

## 2020-09-18 PROCEDURE — 93798 PHYS/QHP OP CAR RHAB W/ECG: CPT

## 2020-09-21 ENCOUNTER — HOSPITAL ENCOUNTER (OUTPATIENT)
Dept: CARDIAC REHAB | Age: 70
Setting detail: THERAPIES SERIES
Discharge: HOME | End: 2020-09-21
Payer: MEDICARE

## 2020-09-21 PROCEDURE — 93798 PHYS/QHP OP CAR RHAB W/ECG: CPT

## 2020-09-23 ENCOUNTER — HOSPITAL ENCOUNTER (OUTPATIENT)
Dept: CARDIAC REHAB | Age: 70
Setting detail: THERAPIES SERIES
Discharge: HOME | End: 2020-09-23
Payer: MEDICARE

## 2020-09-23 PROCEDURE — 93798 PHYS/QHP OP CAR RHAB W/ECG: CPT

## 2020-09-25 ENCOUNTER — HOSPITAL ENCOUNTER (OUTPATIENT)
Dept: CARDIAC REHAB | Age: 70
Setting detail: THERAPIES SERIES
Discharge: HOME | End: 2020-09-25
Payer: MEDICARE

## 2020-09-25 PROCEDURE — 93798 PHYS/QHP OP CAR RHAB W/ECG: CPT

## 2020-09-28 ENCOUNTER — HOSPITAL ENCOUNTER (OUTPATIENT)
Dept: CARDIAC REHAB | Age: 70
Setting detail: THERAPIES SERIES
Discharge: HOME | End: 2020-09-28
Payer: MEDICARE

## 2020-09-28 PROCEDURE — 93798 PHYS/QHP OP CAR RHAB W/ECG: CPT

## 2020-09-30 ENCOUNTER — HOSPITAL ENCOUNTER (OUTPATIENT)
Dept: CARDIAC REHAB | Age: 70
Setting detail: THERAPIES SERIES
Discharge: HOME | End: 2020-09-30
Payer: MEDICARE

## 2020-09-30 PROCEDURE — 93798 PHYS/QHP OP CAR RHAB W/ECG: CPT

## 2020-10-02 ENCOUNTER — HOSPITAL ENCOUNTER (OUTPATIENT)
Dept: CARDIAC REHAB | Age: 70
Setting detail: THERAPIES SERIES
Discharge: HOME | End: 2020-10-02
Payer: MEDICARE

## 2020-10-02 PROCEDURE — 93798 PHYS/QHP OP CAR RHAB W/ECG: CPT

## 2020-10-05 ENCOUNTER — HOSPITAL ENCOUNTER (OUTPATIENT)
Dept: CARDIAC REHAB | Age: 70
Setting detail: THERAPIES SERIES
Discharge: HOME | End: 2020-10-05
Payer: MEDICARE

## 2020-10-05 PROCEDURE — 93798 PHYS/QHP OP CAR RHAB W/ECG: CPT

## 2020-10-06 ENCOUNTER — TELEPHONE (OUTPATIENT)
Dept: CARDIAC REHAB | Age: 70
End: 2020-10-06

## 2020-10-06 NOTE — TELEPHONE ENCOUNTER
10/6/2020 10;39 am Nutrition: Spoke with patient on this date confirming appointment scheduled for 10/12/2020 at 9:00 am. Will remain available.  Electronically signed by Mitzie Leventhal, RD, LD on 10/6/20 at 10:40 AM EDT

## 2020-10-07 ENCOUNTER — HOSPITAL ENCOUNTER (OUTPATIENT)
Dept: CARDIAC REHAB | Age: 70
Setting detail: THERAPIES SERIES
Discharge: HOME | End: 2020-10-07
Payer: MEDICARE

## 2020-10-07 PROCEDURE — 93798 PHYS/QHP OP CAR RHAB W/ECG: CPT

## 2020-10-12 ENCOUNTER — TELEPHONE (OUTPATIENT)
Dept: CARDIAC REHAB | Age: 70
End: 2020-10-12

## 2020-10-12 NOTE — TELEPHONE ENCOUNTER
10/12/2020 8:42 am Nutrition: Patient cancelled appointment scheduled for today at 9 am due to . RD left voice message regarding rescheduling. Will remain available.  Electronically signed by Eligha Koyanagi, RD, LD on 10/12/20 at 8:44 AM EDT

## 2020-10-14 ENCOUNTER — HOSPITAL ENCOUNTER (OUTPATIENT)
Dept: CARDIAC REHAB | Age: 70
Setting detail: THERAPIES SERIES
Discharge: HOME | End: 2020-10-14
Payer: MEDICARE

## 2020-10-14 PROCEDURE — 93798 PHYS/QHP OP CAR RHAB W/ECG: CPT

## 2020-10-16 ENCOUNTER — HOSPITAL ENCOUNTER (OUTPATIENT)
Dept: CARDIAC REHAB | Age: 70
Setting detail: THERAPIES SERIES
Discharge: HOME | End: 2020-10-16
Payer: MEDICARE

## 2020-10-16 PROCEDURE — 93798 PHYS/QHP OP CAR RHAB W/ECG: CPT

## 2020-10-19 ENCOUNTER — HOSPITAL ENCOUNTER (OUTPATIENT)
Dept: CARDIAC REHAB | Age: 70
Setting detail: THERAPIES SERIES
Discharge: HOME | End: 2020-10-19
Payer: MEDICARE

## 2020-10-19 PROCEDURE — 93798 PHYS/QHP OP CAR RHAB W/ECG: CPT

## 2020-10-21 ENCOUNTER — HOSPITAL ENCOUNTER (OUTPATIENT)
Dept: CARDIAC REHAB | Age: 70
Setting detail: THERAPIES SERIES
Discharge: HOME | End: 2020-10-21
Payer: MEDICARE

## 2020-10-21 PROCEDURE — 93798 PHYS/QHP OP CAR RHAB W/ECG: CPT

## 2020-10-23 ENCOUNTER — HOSPITAL ENCOUNTER (OUTPATIENT)
Dept: CARDIAC REHAB | Age: 70
Setting detail: THERAPIES SERIES
End: 2020-10-23
Payer: MEDICARE

## 2020-10-26 ENCOUNTER — HOSPITAL ENCOUNTER (OUTPATIENT)
Dept: CARDIAC REHAB | Age: 70
Setting detail: THERAPIES SERIES
Discharge: HOME | End: 2020-10-26
Payer: MEDICARE

## 2020-10-26 PROCEDURE — 93798 PHYS/QHP OP CAR RHAB W/ECG: CPT

## 2020-10-28 ENCOUNTER — HOSPITAL ENCOUNTER (OUTPATIENT)
Dept: CARDIAC REHAB | Age: 70
Setting detail: THERAPIES SERIES
Discharge: HOME | End: 2020-10-28
Payer: MEDICARE

## 2020-10-28 PROCEDURE — 93798 PHYS/QHP OP CAR RHAB W/ECG: CPT

## 2020-10-30 ENCOUNTER — HOSPITAL ENCOUNTER (OUTPATIENT)
Dept: CARDIAC REHAB | Age: 70
Setting detail: THERAPIES SERIES
Discharge: HOME | End: 2020-10-30
Payer: MEDICARE

## 2020-10-30 PROCEDURE — 93798 PHYS/QHP OP CAR RHAB W/ECG: CPT

## 2020-11-02 ENCOUNTER — HOSPITAL ENCOUNTER (OUTPATIENT)
Dept: CARDIAC REHAB | Age: 70
Setting detail: THERAPIES SERIES
Discharge: HOME | End: 2020-11-02
Payer: MEDICARE

## 2020-11-02 PROCEDURE — 93798 PHYS/QHP OP CAR RHAB W/ECG: CPT

## 2020-11-04 ENCOUNTER — HOSPITAL ENCOUNTER (OUTPATIENT)
Dept: CARDIAC REHAB | Age: 70
Setting detail: THERAPIES SERIES
Discharge: HOME | End: 2020-11-04
Payer: MEDICARE

## 2020-11-04 PROCEDURE — 93798 PHYS/QHP OP CAR RHAB W/ECG: CPT

## 2020-11-06 ENCOUNTER — HOSPITAL ENCOUNTER (OUTPATIENT)
Dept: CARDIAC REHAB | Age: 70
Setting detail: THERAPIES SERIES
Discharge: HOME | End: 2020-11-06
Payer: MEDICARE

## 2020-11-06 PROCEDURE — 93798 PHYS/QHP OP CAR RHAB W/ECG: CPT

## 2020-11-09 ENCOUNTER — HOSPITAL ENCOUNTER (OUTPATIENT)
Dept: CARDIAC REHAB | Age: 70
Setting detail: THERAPIES SERIES
Discharge: HOME | End: 2020-11-09
Payer: MEDICARE

## 2020-11-09 PROCEDURE — 93798 PHYS/QHP OP CAR RHAB W/ECG: CPT

## 2020-11-10 ENCOUNTER — HOSPITAL ENCOUNTER (OUTPATIENT)
Dept: CARDIAC REHAB | Age: 70
Setting detail: THERAPIES SERIES
Discharge: HOME | End: 2020-11-10
Payer: MEDICARE

## 2020-11-10 PROCEDURE — 93798 PHYS/QHP OP CAR RHAB W/ECG: CPT

## 2020-11-13 ENCOUNTER — HOSPITAL ENCOUNTER (OUTPATIENT)
Dept: CARDIAC REHAB | Age: 70
Setting detail: THERAPIES SERIES
Discharge: HOME | End: 2020-11-13
Payer: MEDICARE

## 2020-11-13 PROCEDURE — 93798 PHYS/QHP OP CAR RHAB W/ECG: CPT

## 2020-11-16 ENCOUNTER — HOSPITAL ENCOUNTER (OUTPATIENT)
Dept: CARDIAC REHAB | Age: 70
Setting detail: THERAPIES SERIES
Discharge: HOME | End: 2020-11-16
Payer: MEDICARE

## 2020-11-16 PROCEDURE — 93798 PHYS/QHP OP CAR RHAB W/ECG: CPT

## 2020-11-18 ENCOUNTER — HOSPITAL ENCOUNTER (OUTPATIENT)
Dept: CARDIAC REHAB | Age: 70
Setting detail: THERAPIES SERIES
Discharge: HOME | End: 2020-11-18
Payer: MEDICARE

## 2020-11-18 PROCEDURE — 93798 PHYS/QHP OP CAR RHAB W/ECG: CPT

## 2020-11-20 ENCOUNTER — HOSPITAL ENCOUNTER (OUTPATIENT)
Dept: CARDIAC REHAB | Age: 70
Setting detail: THERAPIES SERIES
Discharge: HOME | End: 2020-11-20
Payer: MEDICARE

## 2020-11-20 PROCEDURE — 93798 PHYS/QHP OP CAR RHAB W/ECG: CPT

## 2020-11-23 PROBLEM — Z99.11 ON MECHANICALLY ASSISTED VENTILATION (HCC): Status: ACTIVE | Noted: 2020-11-23

## 2020-11-23 PROBLEM — E44.0 MODERATE PROTEIN-CALORIE MALNUTRITION (HCC): Status: ACTIVE | Noted: 2020-11-23

## 2020-11-23 PROBLEM — B18.1 CHRONIC HEPATITIS B (HCC): Status: ACTIVE | Noted: 2020-11-23

## 2020-12-24 ENCOUNTER — CLINICAL DOCUMENTATION (OUTPATIENT)
Dept: CARDIAC REHAB | Age: 70
End: 2020-12-24

## 2021-01-05 ENCOUNTER — OFFICE VISIT (OUTPATIENT)
Dept: CARDIOLOGY CLINIC | Age: 71
End: 2021-01-05
Payer: MEDICARE

## 2021-01-05 VITALS
HEART RATE: 72 BPM | WEIGHT: 145.2 LBS | RESPIRATION RATE: 16 BRPM | SYSTOLIC BLOOD PRESSURE: 100 MMHG | HEIGHT: 63 IN | DIASTOLIC BLOOD PRESSURE: 60 MMHG | BODY MASS INDEX: 25.73 KG/M2

## 2021-01-05 DIAGNOSIS — I10 HYPERTENSION, UNSPECIFIED TYPE: Primary | ICD-10-CM

## 2021-01-05 PROCEDURE — G8484 FLU IMMUNIZE NO ADMIN: HCPCS | Performed by: INTERNAL MEDICINE

## 2021-01-05 PROCEDURE — 99204 OFFICE O/P NEW MOD 45 MIN: CPT | Performed by: INTERNAL MEDICINE

## 2021-01-05 PROCEDURE — G8427 DOCREV CUR MEDS BY ELIG CLIN: HCPCS | Performed by: INTERNAL MEDICINE

## 2021-01-05 PROCEDURE — G8417 CALC BMI ABV UP PARAM F/U: HCPCS | Performed by: INTERNAL MEDICINE

## 2021-01-05 PROCEDURE — 93000 ELECTROCARDIOGRAM COMPLETE: CPT | Performed by: INTERNAL MEDICINE

## 2021-01-05 NOTE — PROGRESS NOTES
Alix Troy  1950  Date of Service: 1/5/2021    Reason for Consultation: We were asked to see Alix Troy by Dr. Caryle Meeter, MD  regarding establishing with a local cardiologist.    History of Chief Complaint: This is a 78 y.o. male with a history of AVR with total aortic arch repair (40 x 10 TCA G antegrade TEVAR) and stenting of the left subclavian and left carotid artery with PFO closure and left atrial appendage ligation on 6/16/2020 at [facility] clinic. He also has a history of mild to moderate mitral regurgitation, COPD, hypertension, hypercholesterolemia, hepatitis B, kidney stones, GERD, macular degeneration with left eye blindness, and BPH. [facility] on 6/16/2020. Since then he has completed cardiac rehab. He is now going to cardiac rehab 3 times a week for exercise where he uses the treadmill, stationary bike, and arm exercise machine. He denies any chest discomfort, dyspnea on exertion, orthopnea/PND. He denies any palpitations, or syncope, or near syncope. REVIEW OF SYSTEMS:  As above. See patient questionair for further review of systems.      CURRENT MEDICATIONS:  Current Outpatient Medications   Medication Sig Dispense Refill    Albuterol Sulfate (PROAIR HFA IN) Inhale into the lungs      tamsulosin (FLOMAX) 0.4 MG capsule Take 0.4 mg by mouth daily      calcium carbonate 600 MG TABS tablet Take 1 tablet by mouth daily      mometasone-formoterol (DULERA) 200-5 MCG/ACT inhaler Inhale 2 puffs into the lungs every 12 hours      fluticasone (FLONASE) 50 MCG/ACT nasal spray 1 spray by Each Nostril route daily      Multiple Vitamins-Minerals (PRESERVISION AREDS 2+MULTI VIT PO) Take 1 capsule by mouth daily      acetaminophen (TYLENOL) 500 MG tablet Take 500 mg by mouth every 6 hours as needed for Pain      aspirin 81 MG chewable tablet Take 81 mg by mouth daily      atorvastatin (LIPITOR) 10 MG tablet Take 10 mg by mouth nightly       No current facility-administered medications for this visit. ALLERGIES:  Allergies   Allergen Reactions    Statins [Statins]      Caused muscle aches and weakness. MEDICAL HISTORY:  Past Medical History:   Diagnosis Date    Aneurysm artery, pulmonary (HCC)     Arthritis     rheumatoid    Asthma 1974    status asthmaticus in 3/2004.  Cancer ([facility] [address]     basal cell to face    Deviated septum     S/P repair x 3.    Hepatitis B     Hypercholesterolemia     Intolerance to statins.  Hypertension     Kidney stone     Restless leg syndrome 3/2004    Negative sleep study for sleep apnea. Positive for restless leg syndrome.  Rheumatoid arthritis(714.0)        SURGICAL HISTORY:  Past Surgical History:   Procedure Laterality Date    ADENOIDECTOMY      CARDIAC CATHETERIZATION      CARDIAC VALVE SURGERY  06/16/2020    AVR and repair of ascending aortic aneurysm in [place] CARDIOVASCULAR STRESS TEST  1994    Stress-induced ischemia involving the interventricular septum    CATARACT REMOVAL Bilateral 2002    COLONOSCOPY      CYST REMOVAL      DIAGNOSTIC CARDIAC CATH LAB PROCEDURE      Normal per patient (this was done by Dr. Azar Yanes and records are not available.)    HERNIA REPAIR Left     HERNIA REPAIR      Right luminal hernia repair.     SKIN BIOPSY  03/27/2018    Dr Tigre Dow cheek-ulcerated basal cell carcinoma    TONSILLECTOMY      VASECTOMY         FAMILY HISTORY:  Family History   Adopted: Yes       SOCIAL HISTORY:  Social History     Socioeconomic History    Marital status:      Spouse name: None    Number of children: None    Years of education: None    Highest education level: None   Occupational History    None   Social Needs    Financial resource strain: None    Food insecurity     Worry: None     Inability: None    Transportation needs     Medical: None     Non-medical: None   Tobacco Use    Smoking status: Former Smoker     Packs/day: 2.50     Years: 2.00 Pack years: 5.00     Start date:      Quit date: 1975     Years since quittin.1    Smokeless tobacco: Never Used    Tobacco comment: Quit in . Substance and Sexual Activity    Alcohol use: No     Comment: not in 5 years    Drug use: No    Sexual activity: None   Lifestyle    Physical activity     Days per week: None     Minutes per session: None    Stress: None   Relationships    Social connections     Talks on phone: None     Gets together: None     Attends Gnosticism service: None     Active member of club or organization: None     Attends meetings of clubs or organizations: None     Relationship status: None    Intimate partner violence     Fear of current or ex partner: None     Emotionally abused: None     Physically abused: None     Forced sexual activity: None   Other Topics Concern    None   Social History Narrative    None       PHYSICAL EXAM:  Vitals:    21 0903   BP: 100/60   Pulse: 72   Resp: 16   Weight: 145 lb 3.2 oz (65.9 kg)   Height: 5' 3\" (1.6 m)       GENERAL:  He is alert and oriented x 3, communicates well, in no distress. NECK:  No masses, trachea is mid position. Supple, full ROM, no JVD or bruits. No palpable thyromegaly or lymphadenopathy. HEART:  Regular rate and rhythm. Normal S1 and S2. There is a 4-5/2 systolic ejection murmur. No heaves. LUNGS:  Clear to auscultation bilaterally. No use of accessory muscles. ABDOMEN:  Soft, non-tender. Normal bowel sounds. He had a small suture protruding from his epigastric area that he had been trying to pull but was unable to get it to come loose. EXTREMITIES:  Full ROM x4. No bilateral lower extremity edema. Good distal pulses. EYES:  PERRL, normal lids & conjunctiva. No icterus. ENT: no external masses, no bleeding. Moist mucosa. Normal lips formation. NEURO: Full ROM x 4, EOMI, no tremors. SKIN:  Warm, dry and intact. Normal turgor, no petechia. Phych: alert & oriented x3.   Normal Judgement & insight. Currently not agitated or anxious. EKG: Sinus rhythm, 72 bpm, nl axis, nonspecific ST - T wave changes. 2 PACs. Assessment:   1. Status post AVR with total aortic arch repair with a 40 x 10 TCA G antegrade TEVAR, left subclavian stenting, left carotid stenting, PFO closure, and left atrial appendage ligation on 6/16/2020 at [email or web address]. 2. Mild to moderate mitral regurgitation. Well compensated today. 3. Hypertension. Very well controlled. In fact, he states that his losartan that he was taking prior to his surgery had to be discontinued. His Lopressor also had to be discontinued. 4. Postop atrial fibrillation. He has subsequently undergone Holter monitoring and his Eliquis was discontinued. Maintaining sinus today. 5. COPD. 6. Preop cardiac catheterization demonstrated nonischemic CAD. 7. Hypercholesterolemia  8. Hepatitis B  9. Kidney stones  10. GERD  11. Macular degeneration with left eye blindness  12. BPH        Recommendations:  1. I was able to pull the suture from his upper abdomen. There was a tiny entrance and tiny exit hole. There was a tiny amount of serous drainage. No purulence. No erythema or redness. The area was then cleaned with an alcohol swab and 2 small Band-Aids placed. 2. Continue his current cardiac medications. 3. He states that he is planning on having another Holter performed in [email or web address] in March. Thank you for allowing me to participate in your patient's care. [address] [facility], [address] [facility]  Interventional Cardiology    Note: This report was completed using computerized voice recognition software. Every effort has been made to ensure accuracy, however; and invert and computerized transcription errors may be present.

## 2021-01-06 PROCEDURE — 9900000038 HC CARDIAC REHAB PHASE 3 - MONTHLY

## 2021-01-25 ENCOUNTER — HOSPITAL ENCOUNTER (OUTPATIENT)
Dept: CARDIAC REHAB | Age: 71
Discharge: HOME | End: 2021-01-25

## 2021-02-01 PROCEDURE — 9900000038 HC CARDIAC REHAB PHASE 3 - MONTHLY

## 2021-02-22 ENCOUNTER — HOSPITAL ENCOUNTER (OUTPATIENT)
Dept: CARDIAC REHAB | Age: 71
Discharge: HOME | End: 2021-02-22

## 2021-03-03 PROCEDURE — 9900000038 HC CARDIAC REHAB PHASE 3 - MONTHLY

## 2021-03-29 ENCOUNTER — HOSPITAL ENCOUNTER (OUTPATIENT)
Dept: CARDIAC REHAB | Age: 71
Discharge: HOME | End: 2021-03-29

## 2021-03-31 PROCEDURE — 9900000038 HC CARDIAC REHAB PHASE 3 - MONTHLY

## 2021-04-26 ENCOUNTER — HOSPITAL ENCOUNTER (OUTPATIENT)
Dept: CARDIAC REHAB | Age: 71
Discharge: HOME | End: 2021-04-26

## 2021-04-26 PROCEDURE — 9900000038 HC CARDIAC REHAB PHASE 3 - MONTHLY

## 2021-05-05 DIAGNOSIS — M05.79 RHEUMATOID ARTHRITIS INVOLVING MULTIPLE SITES WITH POSITIVE RHEUMATOID FACTOR (HCC): ICD-10-CM

## 2021-05-05 DIAGNOSIS — E78.00 PURE HYPERCHOLESTEROLEMIA: ICD-10-CM

## 2021-05-05 LAB
ALBUMIN SERPL-MCNC: 4.3 G/DL (ref 3.5–5.2)
ALP BLD-CCNC: 50 U/L (ref 40–129)
ALT SERPL-CCNC: 23 U/L (ref 0–40)
ANION GAP SERPL CALCULATED.3IONS-SCNC: 10 MMOL/L (ref 7–16)
AST SERPL-CCNC: 32 U/L (ref 0–39)
BASOPHILS ABSOLUTE: 0.06 E9/L (ref 0–0.2)
BASOPHILS RELATIVE PERCENT: 0.8 % (ref 0–2)
BILIRUB SERPL-MCNC: 1.5 MG/DL (ref 0–1.2)
BUN BLDV-MCNC: 21 MG/DL (ref 6–23)
CALCIUM SERPL-MCNC: 9.6 MG/DL (ref 8.6–10.2)
CHLORIDE BLD-SCNC: 107 MMOL/L (ref 98–107)
CHOLESTEROL, TOTAL: 122 MG/DL (ref 0–199)
CO2: 23 MMOL/L (ref 22–29)
CREAT SERPL-MCNC: 1 MG/DL (ref 0.7–1.2)
EOSINOPHILS ABSOLUTE: 0.58 E9/L (ref 0.05–0.5)
EOSINOPHILS RELATIVE PERCENT: 7.4 % (ref 0–6)
GFR AFRICAN AMERICAN: >60
GFR NON-AFRICAN AMERICAN: >60 ML/MIN/1.73
GLUCOSE BLD-MCNC: 109 MG/DL (ref 74–99)
HCT VFR BLD CALC: 41.4 % (ref 37–54)
HDLC SERPL-MCNC: 52 MG/DL
HEMOGLOBIN: 12.9 G/DL (ref 12.5–16.5)
IMMATURE GRANULOCYTES #: 0.02 E9/L
IMMATURE GRANULOCYTES %: 0.3 % (ref 0–5)
LDL CHOLESTEROL CALCULATED: 61 MG/DL (ref 0–99)
LYMPHOCYTES ABSOLUTE: 1.53 E9/L (ref 1.5–4)
LYMPHOCYTES RELATIVE PERCENT: 19.6 % (ref 20–42)
MCH RBC QN AUTO: 31.3 PG (ref 26–35)
MCHC RBC AUTO-ENTMCNC: 31.2 % (ref 32–34.5)
MCV RBC AUTO: 100.5 FL (ref 80–99.9)
MONOCYTES ABSOLUTE: 0.57 E9/L (ref 0.1–0.95)
MONOCYTES RELATIVE PERCENT: 7.3 % (ref 2–12)
NEUTROPHILS ABSOLUTE: 5.06 E9/L (ref 1.8–7.3)
NEUTROPHILS RELATIVE PERCENT: 64.6 % (ref 43–80)
PDW BLD-RTO: 13.1 FL (ref 11.5–15)
PLATELET # BLD: 159 E9/L (ref 130–450)
PMV BLD AUTO: 10.7 FL (ref 7–12)
POTASSIUM SERPL-SCNC: 4 MMOL/L (ref 3.5–5)
RBC # BLD: 4.12 E12/L (ref 3.8–5.8)
SODIUM BLD-SCNC: 140 MMOL/L (ref 132–146)
TOTAL PROTEIN: 6.9 G/DL (ref 6.4–8.3)
TRIGL SERPL-MCNC: 47 MG/DL (ref 0–149)
VLDLC SERPL CALC-MCNC: 9 MG/DL
WBC # BLD: 7.8 E9/L (ref 4.5–11.5)

## 2021-05-05 PROCEDURE — 9900000038 HC CARDIAC REHAB PHASE 3 - MONTHLY

## 2021-05-09 PROBLEM — I48.92 ATRIAL FIBRILLATION AND FLUTTER (HCC): Status: ACTIVE | Noted: 2021-05-09

## 2021-05-09 PROBLEM — I48.91 ATRIAL FIBRILLATION AND FLUTTER (HCC): Status: ACTIVE | Noted: 2021-05-09

## 2021-05-24 ENCOUNTER — HOSPITAL ENCOUNTER (OUTPATIENT)
Dept: CARDIAC REHAB | Age: 71
Discharge: HOME | End: 2021-05-24

## 2021-06-07 PROCEDURE — 9900000038 HC CARDIAC REHAB PHASE 3 - MONTHLY

## 2021-06-28 ENCOUNTER — HOSPITAL ENCOUNTER (OUTPATIENT)
Dept: CARDIAC REHAB | Age: 71
Discharge: HOME | End: 2021-06-28

## 2021-07-12 PROCEDURE — 9900000038 HC CARDIAC REHAB PHASE 3 - MONTHLY

## 2021-07-26 ENCOUNTER — HOSPITAL ENCOUNTER (OUTPATIENT)
Dept: CARDIAC REHAB | Age: 71
Discharge: HOME | End: 2021-07-26

## 2021-08-04 PROCEDURE — 9900000038 HC CARDIAC REHAB PHASE 3 - MONTHLY

## 2021-08-30 ENCOUNTER — HOSPITAL ENCOUNTER (OUTPATIENT)
Dept: CARDIAC REHAB | Age: 71
Discharge: HOME | End: 2021-08-30

## 2021-09-01 PROCEDURE — 9900000038 HC CARDIAC REHAB PHASE 3 - MONTHLY

## 2021-09-27 ENCOUNTER — HOSPITAL ENCOUNTER (OUTPATIENT)
Dept: CARDIAC REHAB | Age: 71
Discharge: HOME | End: 2021-09-27

## 2021-10-05 PROCEDURE — 9900000038 HC CARDIAC REHAB PHASE 3 - MONTHLY

## 2021-10-25 ENCOUNTER — HOSPITAL ENCOUNTER (OUTPATIENT)
Dept: CARDIAC REHAB | Age: 71
Discharge: HOME | End: 2021-10-25

## 2021-11-04 PROCEDURE — 9900000038 HC CARDIAC REHAB PHASE 3 - MONTHLY

## 2021-11-29 ENCOUNTER — HOSPITAL ENCOUNTER (OUTPATIENT)
Dept: CARDIAC REHAB | Age: 71
Discharge: HOME | End: 2021-11-29

## 2021-12-08 PROCEDURE — 9900000038 HC CARDIAC REHAB PHASE 3 - MONTHLY

## 2021-12-27 ENCOUNTER — HOSPITAL ENCOUNTER (OUTPATIENT)
Dept: CARDIAC REHAB | Age: 71
Discharge: HOME | End: 2021-12-27

## 2021-12-31 ENCOUNTER — OFFICE VISIT (OUTPATIENT)
Dept: FAMILY MEDICINE CLINIC | Age: 71
End: 2021-12-31
Payer: MEDICARE

## 2021-12-31 VITALS
DIASTOLIC BLOOD PRESSURE: 72 MMHG | RESPIRATION RATE: 20 BRPM | OXYGEN SATURATION: 96 % | HEART RATE: 73 BPM | SYSTOLIC BLOOD PRESSURE: 130 MMHG | WEIGHT: 140 LBS | BODY MASS INDEX: 23.32 KG/M2 | TEMPERATURE: 98.2 F | HEIGHT: 65 IN

## 2021-12-31 DIAGNOSIS — Z20.822 SUSPECTED COVID-19 VIRUS INFECTION: Primary | ICD-10-CM

## 2021-12-31 DIAGNOSIS — J06.9 ACUTE UPPER RESPIRATORY INFECTION, UNSPECIFIED: ICD-10-CM

## 2021-12-31 DIAGNOSIS — R05.9 COUGH: ICD-10-CM

## 2021-12-31 LAB
Lab: NORMAL
PERFORMING INSTRUMENT: NORMAL
QC PASS/FAIL: NORMAL
SARS-COV-2, POC: NORMAL

## 2021-12-31 PROCEDURE — 3017F COLORECTAL CA SCREEN DOC REV: CPT | Performed by: PHYSICIAN ASSISTANT

## 2021-12-31 PROCEDURE — G8427 DOCREV CUR MEDS BY ELIG CLIN: HCPCS | Performed by: PHYSICIAN ASSISTANT

## 2021-12-31 PROCEDURE — G8484 FLU IMMUNIZE NO ADMIN: HCPCS | Performed by: PHYSICIAN ASSISTANT

## 2021-12-31 PROCEDURE — G8420 CALC BMI NORM PARAMETERS: HCPCS | Performed by: PHYSICIAN ASSISTANT

## 2021-12-31 PROCEDURE — 1036F TOBACCO NON-USER: CPT | Performed by: PHYSICIAN ASSISTANT

## 2021-12-31 PROCEDURE — 99203 OFFICE O/P NEW LOW 30 MIN: CPT | Performed by: PHYSICIAN ASSISTANT

## 2021-12-31 PROCEDURE — 4040F PNEUMOC VAC/ADMIN/RCVD: CPT | Performed by: PHYSICIAN ASSISTANT

## 2021-12-31 PROCEDURE — 1123F ACP DISCUSS/DSCN MKR DOCD: CPT | Performed by: PHYSICIAN ASSISTANT

## 2021-12-31 PROCEDURE — 87426 SARSCOV CORONAVIRUS AG IA: CPT | Performed by: PHYSICIAN ASSISTANT

## 2021-12-31 RX ORDER — CHLORPHENIRAMINE MALEATE 4 MG/1
4 TABLET ORAL EVERY 6 HOURS PRN
Qty: 20 TABLET | Refills: 0 | Status: SHIPPED
Start: 2021-12-31 | End: 2022-04-23

## 2021-12-31 RX ORDER — BENZONATATE 100 MG/1
100 CAPSULE ORAL 3 TIMES DAILY PRN
Qty: 21 CAPSULE | Refills: 0 | Status: SHIPPED | OUTPATIENT
Start: 2021-12-31 | End: 2022-01-07

## 2021-12-31 NOTE — PROGRESS NOTES
Social History:     Social History     Tobacco Use    Smoking status: Former Smoker     Packs/day: 2.50     Years: 2.00     Pack years: 5.00     Start date:      Quit date: 1975     Years since quittin.0    Smokeless tobacco: Never Used    Tobacco comment: Quit in . Vaping Use    Vaping Use: Never used   Substance Use Topics    Alcohol use: No     Comment: not in 5 years    Drug use: No       Patient lives at home. Physical Exam:     Vitals:    21 0823   BP: 130/72   Site: Right Upper Arm   Position: Sitting   Cuff Size: Medium Adult   Pulse: 73   Resp: 20   Temp: 98.2 °F (36.8 °C)   TempSrc: Temporal   SpO2: 96%   Weight: 140 lb (63.5 kg)   Height: 5' 5\" (1.651 m)       Exam:  Physical Exam  Nurse's notes and vital signs reviewed. The patient is not hypoxic. ? General: Alert, no acute distress, patient resting comfortably Patient is not toxic or lethargic. Skin: Warm, intact, no pallor noted. There is no evidence of rash at this time. Head: Normocephalic, atraumatic  Eye: Normal conjunctiva  Ears, Nose, Throat: Right tympanic membrane clear, left tympanic membrane clear. No drainage or discharge noted. No pre- or post-auricular tenderness, erythema, or swelling noted. Nasal congestion, rhinorrhea, no epistaxis  Posterior oropharynx shows erythema and cobblestoning but no evidence of tonsillar hypertrophy, or exudate. the uvula is midline. No trismus or drooling is noted. Moist mucous membranes. Cardiovascular: Regular Rate and Rhythm  Respiratory: No acute distress, no rhonchi, wheezing or crackles noted. No stridor or retractions are noted.   Neurological: A&O x4, normal speech  Psychiatric: Cooperative         Testing:       Results for orders placed or performed in visit on 21   POCT COVID-19, Antigen   Result Value Ref Range    SARS-COV-2, POC Not-Detected Not Detected    Lot Number [id number]     QC Pass/Fail pass     Performing Instrument BD Veritor Medical Decision Making:     Vital signs reviewed    Past medical history reviewed. Allergies reviewed. Medications reviewed. Patient on arrival does not appear to be in any apparent distress or discomfort. The patient has been seen and evaluated. The patient does not appear to be toxic or lethargic. The patient had a rapid test that was negative. PCR test is pending. We will treat the patient with chlorphentermine and Tessalon Perles. The patient will monitor symptoms and quarantine isolate until we get the PCR test.    The patient was educated on the proper dosage of motrin and tylenol and the appropriate intervals of each. The patient is to increase fluid intake over the next several days. The patient is to use OTC decongestant as needed. The patient is to return to express care or go directly to the emergency department should any of the signs or symptoms worsen. The patient is to followup with primary care physician in 2-3 days for repeat evaluation. The patient has no other questions or concerns at this time the patient will be discharged home. Clinical Impression:   Erika Fry was seen today for headache, cough, nasal congestion, pharyngitis and generalized body aches. Diagnoses and all orders for this visit:    Suspected COVID-19 virus infection  -     POCT COVID-19, Antigen  -     COVID-19 Ambulatory; Future    Acute upper respiratory infection, unspecified    Cough    Other orders  -     chlorpheniramine (ALLER-CHLOR) 4 MG tablet; Take 1 tablet by mouth every 6 hours as needed for Allergies  -     benzonatate (TESSALON) 100 MG capsule; Take 1 capsule by mouth 3 times daily as needed for Cough        The patient is to call for any concerns or return if any of the signs or symptoms worsen. The patient is to follow-up with PCP in the next 2-3 days for repeat evaluation repeat assessment or go directly to the emergency department.      SIGNATURE: Shobha Michel III, ZECHARIAH

## 2022-01-12 ENCOUNTER — TELEPHONE (OUTPATIENT)
Dept: PRIMARY CARE CLINIC | Age: 72
End: 2022-01-12

## 2022-01-12 NOTE — TELEPHONE ENCOUNTER
Will you take him as a new patient?    ----- Message from Josendmaite Dayenne sent at 2022 10:44 AM EST -----  Subject: Appointment Request    Reason for Call: New Patient Request Appointment    QUESTIONS  Type of Appointment? New Patient/New to Provider  Reason for appointment request? No appointments available during search  Additional Information for Provider? Patient would like to establish care   with Dr. Marcio Pimentel. Patient had open heart surgery 2020, chronic asthma, &   on maintenance medications.  ---------------------------------------------------------------------------  --------------  CALL BACK INFO  What is the best way for the office to contact you? OK to leave message on   voicemail  Preferred Call Back Phone Number? [phone]  ---------------------------------------------------------------------------  --------------  SCRIPT ANSWERS  Relationship to Patient? Self  Is this the first appointment to establish care for a ? No  Have you been diagnosed with, awaiting test results for, or told that you   are suspected of having COVID-19 (Coronavirus)? (If patient has tested   negative or was tested as a requirement for work, school, or travel and   not based on symptoms, answer no)? Yes  Did your symptoms begin within the past 14 days or was your positive test   result within the past 14 days?  Yes

## 2022-01-27 ENCOUNTER — OFFICE VISIT (OUTPATIENT)
Dept: PRIMARY CARE CLINIC | Age: 72
End: 2022-01-27
Payer: MEDICARE

## 2022-01-27 DIAGNOSIS — E78.2 MIXED HYPERLIPIDEMIA: ICD-10-CM

## 2022-01-27 DIAGNOSIS — E03.9 ACQUIRED HYPOTHYROIDISM: ICD-10-CM

## 2022-01-27 DIAGNOSIS — I71.20 THORACIC AORTIC ANEURYSM WITHOUT RUPTURE: ICD-10-CM

## 2022-01-27 DIAGNOSIS — E79.0 HYPERURICEMIA: ICD-10-CM

## 2022-01-27 DIAGNOSIS — B18.1 CHRONIC HEPATITIS B (HCC): ICD-10-CM

## 2022-01-27 DIAGNOSIS — M81.0 AGE-RELATED OSTEOPOROSIS WITHOUT CURRENT PATHOLOGICAL FRACTURE: ICD-10-CM

## 2022-01-27 DIAGNOSIS — Z12.5 PROSTATE CANCER SCREENING: ICD-10-CM

## 2022-01-27 DIAGNOSIS — M05.79 RHEUMATOID ARTHRITIS INVOLVING MULTIPLE SITES WITH POSITIVE RHEUMATOID FACTOR (HCC): ICD-10-CM

## 2022-01-27 DIAGNOSIS — I10 ESSENTIAL HYPERTENSION: ICD-10-CM

## 2022-01-27 DIAGNOSIS — E11.9 DIET-CONTROLLED DIABETES MELLITUS (HCC): ICD-10-CM

## 2022-01-27 DIAGNOSIS — J43.9 PULMONARY EMPHYSEMA, UNSPECIFIED EMPHYSEMA TYPE (HCC): ICD-10-CM

## 2022-01-27 DIAGNOSIS — I48.91 ATRIAL FIBRILLATION AND FLUTTER (HCC): ICD-10-CM

## 2022-01-27 DIAGNOSIS — K82.8 GALLBLADDER SLUDGE: ICD-10-CM

## 2022-01-27 DIAGNOSIS — I48.92 ATRIAL FIBRILLATION AND FLUTTER (HCC): ICD-10-CM

## 2022-01-27 DIAGNOSIS — J45.20 ASTHMA IN ADULT, MILD INTERMITTENT, UNCOMPLICATED: Primary | ICD-10-CM

## 2022-01-27 DIAGNOSIS — N20.0 KIDNEY STONE: ICD-10-CM

## 2022-01-27 DIAGNOSIS — D51.8 VITAMIN B12 DEFICIENCY (DIETARY) ANEMIA: ICD-10-CM

## 2022-01-27 PROBLEM — E44.0 MODERATE PROTEIN-CALORIE MALNUTRITION (HCC): Status: RESOLVED | Noted: 2020-11-23 | Resolved: 2022-01-27

## 2022-01-27 PROCEDURE — G8484 FLU IMMUNIZE NO ADMIN: HCPCS | Performed by: FAMILY MEDICINE

## 2022-01-27 PROCEDURE — 3023F SPIROM DOC REV: CPT | Performed by: FAMILY MEDICINE

## 2022-01-27 PROCEDURE — G8420 CALC BMI NORM PARAMETERS: HCPCS | Performed by: FAMILY MEDICINE

## 2022-01-27 PROCEDURE — 3017F COLORECTAL CA SCREEN DOC REV: CPT | Performed by: FAMILY MEDICINE

## 2022-01-27 PROCEDURE — 1123F ACP DISCUSS/DSCN MKR DOCD: CPT | Performed by: FAMILY MEDICINE

## 2022-01-27 PROCEDURE — 4040F PNEUMOC VAC/ADMIN/RCVD: CPT | Performed by: FAMILY MEDICINE

## 2022-01-27 PROCEDURE — 1036F TOBACCO NON-USER: CPT | Performed by: FAMILY MEDICINE

## 2022-01-27 PROCEDURE — 2022F DILAT RTA XM EVC RTNOPTHY: CPT | Performed by: FAMILY MEDICINE

## 2022-01-27 PROCEDURE — G8427 DOCREV CUR MEDS BY ELIG CLIN: HCPCS | Performed by: FAMILY MEDICINE

## 2022-01-27 PROCEDURE — 3046F HEMOGLOBIN A1C LEVEL >9.0%: CPT | Performed by: FAMILY MEDICINE

## 2022-01-27 PROCEDURE — 99205 OFFICE O/P NEW HI 60 MIN: CPT | Performed by: FAMILY MEDICINE

## 2022-01-27 RX ORDER — ALBUTEROL SULFATE 2.5 MG/3ML
2.5 SOLUTION RESPIRATORY (INHALATION) EVERY 6 HOURS PRN
Qty: 360 EACH | Refills: 3 | Status: SHIPPED | OUTPATIENT
Start: 2022-01-27

## 2022-01-27 NOTE — PROGRESS NOTES
22  Name: Liyah Barrow    : 1950    Sex: male    Age: 73 y.o. Subjective:  Chief Complaint: Patient is here for establish as a new patient, hypertension cholesterol asthma coronary disease hepatitis B kidney stones rheumatoid arthritis    Patient presents today with melissa as a new patient. Is previously physician retired. He has a complicated medical history. No chest pains or shortness of breath at this time. He was born daily came over and 5years old he was adopted. Is referred by his friend Raine Aguilar history is positive for  Hypertension  Hyperlipidemia  Asthma sees Dr. Sue Schafer at [facility]---sister of local eye doc-had been on prednisone for 40 years visit been off now for 7 years  And BPH on Flomax and sees Dr. Alison Owusu 2021  Actinic keratosis and cancer skin with Dr. Lashae Dash right temple  Blind left eye from macular degeneration also treating the right eye  Coronary artery disease sees Dr. Oxana Samayoa at [facility] has seen Dr. Conor Gutierrez in town----  Hepatitis B series at [facility] gastroenterology from working  department   kidney stone  Restless leg syndrome  Rheumatoid arthritis sees rheumatology [place] [facility]  Ultrasound noted 2021 from [facility] with gallbladder sludge which she was unaware of  Paroxysmal atrial fibrillation  Mitral regurgitation      Surgical history is positive for  Several nasal surgeries with Dr. Stan Young and [place]  Vasectomy tonsils  Umbilical right inguinal and left inguinal hernia surgery  Colonoscopies with Dr. Gema Enamorado is due again this year  Heart aortic valve surgery [facility] with fixing of the PFO 2020. Still goes to cardiac rehab           Social history.   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 HENT: Negative. Eyes: Negative. Respiratory: Negative. Cardiovascular: Negative. Gastrointestinal: Negative. Endocrine: Negative. Genitourinary: Negative. Musculoskeletal: Negative. Skin: Negative. Allergic/Immunologic: Negative. Neurological: Negative. Hematological: Negative. Psychiatric/Behavioral: Negative. Current Outpatient Medications:     albuterol (PROVENTIL) (2.5 MG/3ML) 0.083% nebulizer solution, Take 3 mLs by nebulization every 6 hours as needed for Wheezing, Disp: 360 each, Rfl: 3    chlorpheniramine (ALLER-CHLOR) 4 MG tablet, Take 1 tablet by mouth every 6 hours as needed for Allergies, Disp: 20 tablet, Rfl: 0    Calcium Carbonate-Vit D-Min (CALCIUM 1200 PO), Take by mouth, Disp: , Rfl:     Albuterol Sulfate (PROAIR HFA IN), Inhale into the lungs, Disp: , Rfl:     tamsulosin (FLOMAX) 0.4 MG capsule, Take 0.4 mg by mouth daily, Disp: , Rfl:     calcium carbonate 600 MG TABS tablet, Take 1 tablet by mouth daily, Disp: , Rfl:     mometasone-formoterol (DULERA) 200-5 MCG/ACT inhaler, Inhale 2 puffs into the lungs every 12 hours, Disp: , Rfl:     fluticasone (FLONASE) 50 MCG/ACT nasal spray, 1 spray by Each Nostril route daily, Disp: , Rfl:     Multiple Vitamins-Minerals (PRESERVISION AREDS 2+MULTI VIT PO), Take 1 capsule by mouth daily, Disp: , Rfl:     acetaminophen (TYLENOL) 500 MG tablet, Take 500 mg by mouth every 6 hours as needed for Pain, Disp: , Rfl:     aspirin 81 MG chewable tablet, Take 81 mg by mouth daily, Disp: , Rfl:     atorvastatin (LIPITOR) 10 MG tablet, Take 10 mg by mouth nightly, Disp: , Rfl:   Allergies   Allergen Reactions    Statins [Statins]      Caused muscle aches and weakness.      Social History     Socioeconomic History    Marital status:      Spouse name: Not on file    Number of children: Not on file    Years of education: Not on file    Highest education level: Not on file   Occupational History    Not on file   Tobacco Use    Smoking status: Former Smoker     Packs/day: 2.50     Years: 2.00     Pack years: 5.00     Start date:      Quit date: 1975     Years since quittin.1    Smokeless tobacco: Never Used    Tobacco comment: Quit in 67. 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 Was on prednisone for 40 years has been off for 7 years    Macular degeneration with loss of vision left eye    Coronary artery disease sees Dr. Ej Rowe at [facility] [email or web address] [facility]    Aortic valve surgery with repair of PFO 2020 [facility]    Hepatitis B from his work sees gastroenterology [facility]    Kidney stones    Restless leg syndrome    [facility]heumatoid arthritis [facility]    Ultrasound 2020 on clinical clinic mentions gallbladder sludge which she is unaware    BPH sees Dr. Gwendolyn Collazo sees Dr. Feli Ram    S/p multiple hernias    Colonoscopy will be due soon with Dr. Emily Moyer    Paroxysmal atrial fibrillation    Mitral regurgitation     Social Determinants of Health     Financial Resource Strain:     Difficulty of Paying Living Expenses: Not on file   Food Insecurity:     Worried About [address] in the Last Year: Not on file    [address] in the Last Year: Not on file   Transportation Needs:     Lack of Transportation (Medical):  Not on file    Lack of Transportation (Non-Medical): Not on file   Physical Activity:     Days of Exercise per Week: Not on file    Minutes of Exercise per Session: Not on file   Stress:     Feeling of Stress : Not on file   Social Connections:     Frequency of Communication with Friends and Family: Not on file    Frequency of Social Gatherings with Friends and Family: Not on file    Attends Faith Services: Not on file    Active Member of [address] [place] [address] or Organizations: Not on file    Attends Club or Organization Meetings: Not on file    Marital Status: Not on file   Intimate Partner Violence:     Fear of Current or Ex-Partner: Not on file    Emotionally Abused: Not on file    Physically Abused: Not on file    Sexually Abused: Not on file   Housing Stability:     Unable to Pay for Housing in the Last Year: Not on file    Number of [place] in the Last Year: Not on file    Unstable Housing in the Last Year: Not on file      Past Medical History:   Diagnosis Date    [facility]     Arthritis     rheumatoid    Asthma 1974    status asthmaticus in 3/2004.  Cancer ([facility]     basal cell to face    Deviated septum     S/P repair x 3.    Hepatitis B     Hypercholesterolemia     Intolerance to statins.  Hypertension     Kidney stone     Restless leg syndrome 3/2004    Negative sleep study for sleep apnea. Positive for restless leg syndrome.     Rheumatoid arthritis(714.0)      Family History   Adopted: Yes      Past Surgical History:   Procedure Laterality Date    ADENOIDECTOMY      CARDIAC CATHETERIZATION      CARDIAC VALVE SURGERY  06/16/2020    AVR and repair of ascending aortic aneurysm in [place] CARDIOVASCULAR STRESS TEST  1994    Stress-induced ischemia involving the interventricular septum    CATARACT REMOVAL Bilateral 2002    COLONOSCOPY      CYST REMOVAL      DIAGNOSTIC CARDIAC CATH LAB PROCEDURE      Normal per patient (this was done by Dr. Oliver Valdivia and records are not available.)    HERNIA REPAIR Left     HERNIA REPAIR      Right luminal hernia repair.  SKIN BIOPSY  03/27/2018    Dr Des Chavez cheek-ulcerated basal cell carcinoma    TONSILLECTOMY      VASECTOMY        Vitals:    01/27/22 1636   BP: 128/78   Pulse: 66   Temp: 97.2 °F (36.2 °C)   TempSrc: Infrared   SpO2: 97%   Weight: 148 lb (67.1 kg)   Height: 5' 5\" (1.651 m)       Objective:    Physical Exam  Vitals reviewed. Constitutional:       Appearance: He is well-developed. HENT:      Head: Normocephalic. Eyes:      Pupils: Pupils are equal, round, and reactive to light. Cardiovascular:      Rate and Rhythm: Normal rate and regular rhythm. Pulmonary:      Effort: Pulmonary effort is normal.      Breath sounds: Normal breath sounds. Abdominal:      Palpations: Abdomen is soft. Musculoskeletal:         General: Normal range of motion. Cervical back: Normal range of motion. Skin:     General: Skin is warm. Neurological:      Mental Status: He is alert and oriented to person, place, and time. Psychiatric:         Behavior: Behavior normal.         Ana Hopper was seen today for establish care. Diagnoses and all orders for this visit:    Asthma in adult, mild intermittent, uncomplicated  -     albuterol (PROVENTIL) (2.5 MG/3ML) 0.083% nebulizer solution; Take 3 mLs by nebulization every 6 hours as needed for Wheezing    Rheumatoid arthritis involving multiple sites with positive rheumatoid factor ([address]    Pulmonary emphysema, unspecified emphysema type ([address]    Thoracic aortic aneurysm without rupture (HCC)    Atrial fibrillation and flutter ([address]    Chronic hepatitis B ([address]    Mixed hyperlipidemia    Essential hypertension    Kidney stone    Gallbladder sludge        Comments: Great toe time spent reviewing records. We will get full laboratory studies 2 weeks see me back in 1 week later. On discussion of his gallbladder sludge of any symptoms well notify educated.   He will follow up with [facility] cardiology rheumatology gastroenterology. His medications were gone over in detail. Will plan ov every 6 months after next ov        A great deal of time was spent reviewing records and establishing treatment protocol and treatment plan. The majority of the time was spent on face-to-face exam and education. I educated the patient about all medications. Make sure they were correct and educated  on the risk associated with missing meds or taking them incorrectly. A list of medications is being sent home with patient today. .  Check blood pressure at home twice a day. Low-salt low caffeine diet. Call if systolic blood pressure is above 176 and diastolic blood pressures above 85. Only use a upper arm digital cuff. Aggressive low-fat diet. Avoid red meats, greasy fried foods, dairy products. Avoid processed foods. Take cholesterol medications without food. A great deal of time spent reviewing medications, diet, exercise, social issues. Also reviewing the chart before entering the room with patient and finishing charting after leaving patient's room. More than half of that time was spent face to face with the patient in counseling and coordinating care. Follow Up: Return in about 3 weeks (around 2/17/2022) for Lab Before.      Seen by:  Thuy Contreras DO

## 2022-01-28 VITALS
SYSTOLIC BLOOD PRESSURE: 128 MMHG | HEIGHT: 65 IN | BODY MASS INDEX: 24.66 KG/M2 | TEMPERATURE: 97.2 F | DIASTOLIC BLOOD PRESSURE: 78 MMHG | OXYGEN SATURATION: 97 % | HEART RATE: 66 BPM | WEIGHT: 148 LBS

## 2022-01-28 PROBLEM — N20.0 KIDNEY STONE: Status: ACTIVE | Noted: 2022-01-28

## 2022-01-28 PROBLEM — K82.8 GALLBLADDER SLUDGE: Status: ACTIVE | Noted: 2022-01-28

## 2022-01-28 ASSESSMENT — ENCOUNTER SYMPTOMS
RESPIRATORY NEGATIVE: 1
GASTROINTESTINAL NEGATIVE: 1
ALLERGIC/IMMUNOLOGIC NEGATIVE: 1
EYES NEGATIVE: 1

## 2022-01-28 ASSESSMENT — PATIENT HEALTH QUESTIONNAIRE - PHQ9
SUM OF ALL RESPONSES TO PHQ QUESTIONS 1-9: 0
SUM OF ALL RESPONSES TO PHQ QUESTIONS 1-9: 0
2. FEELING DOWN, DEPRESSED OR HOPELESS: 0
SUM OF ALL RESPONSES TO PHQ9 QUESTIONS 1 & 2: 0
1. LITTLE INTEREST OR PLEASURE IN DOING THINGS: 0
SUM OF ALL RESPONSES TO PHQ QUESTIONS 1-9: 0
SUM OF ALL RESPONSES TO PHQ QUESTIONS 1-9: 0

## 2022-01-31 ENCOUNTER — HOSPITAL ENCOUNTER (OUTPATIENT)
Dept: CARDIAC REHAB | Age: 72
Discharge: HOME | End: 2022-01-31

## 2022-02-07 PROCEDURE — 9900000038 HC CARDIAC REHAB PHASE 3 - MONTHLY

## 2022-02-11 DIAGNOSIS — E78.2 MIXED HYPERLIPIDEMIA: ICD-10-CM

## 2022-02-11 DIAGNOSIS — E79.0 HYPERURICEMIA: ICD-10-CM

## 2022-02-11 DIAGNOSIS — I10 ESSENTIAL HYPERTENSION: ICD-10-CM

## 2022-02-11 DIAGNOSIS — M81.0 AGE-RELATED OSTEOPOROSIS WITHOUT CURRENT PATHOLOGICAL FRACTURE: ICD-10-CM

## 2022-02-11 DIAGNOSIS — Z12.5 PROSTATE CANCER SCREENING: ICD-10-CM

## 2022-02-11 DIAGNOSIS — M05.79 RHEUMATOID ARTHRITIS INVOLVING MULTIPLE SITES WITH POSITIVE RHEUMATOID FACTOR (HCC): ICD-10-CM

## 2022-02-11 DIAGNOSIS — E11.9 DIET-CONTROLLED DIABETES MELLITUS (HCC): ICD-10-CM

## 2022-02-11 DIAGNOSIS — D51.8 VITAMIN B12 DEFICIENCY (DIETARY) ANEMIA: ICD-10-CM

## 2022-02-11 DIAGNOSIS — B18.1 CHRONIC HEPATITIS B (HCC): ICD-10-CM

## 2022-02-11 DIAGNOSIS — E03.9 ACQUIRED HYPOTHYROIDISM: ICD-10-CM

## 2022-02-11 LAB
ALBUMIN SERPL-MCNC: 4.4 G/DL (ref 3.5–5.2)
ALP BLD-CCNC: 57 U/L (ref 40–129)
ALT SERPL-CCNC: 23 U/L (ref 0–40)
ANION GAP SERPL CALCULATED.3IONS-SCNC: 18 MMOL/L (ref 7–16)
AST SERPL-CCNC: 37 U/L (ref 0–39)
BASOPHILS ABSOLUTE: 0.07 E9/L (ref 0–0.2)
BASOPHILS RELATIVE PERCENT: 0.8 % (ref 0–2)
BILIRUB SERPL-MCNC: 1.5 MG/DL (ref 0–1.2)
BILIRUBIN URINE: NEGATIVE
BLOOD, URINE: NEGATIVE
BUN BLDV-MCNC: 18 MG/DL (ref 6–23)
CALCIUM SERPL-MCNC: 10 MG/DL (ref 8.6–10.2)
CHLORIDE BLD-SCNC: 108 MMOL/L (ref 98–107)
CHOLESTEROL, TOTAL: 128 MG/DL (ref 0–199)
CLARITY: CLEAR
CO2: 17 MMOL/L (ref 22–29)
COLOR: YELLOW
CREAT SERPL-MCNC: 1 MG/DL (ref 0.7–1.2)
EOSINOPHILS ABSOLUTE: 1.46 E9/L (ref 0.05–0.5)
EOSINOPHILS RELATIVE PERCENT: 17.4 % (ref 0–6)
GFR AFRICAN AMERICAN: >60
GFR NON-AFRICAN AMERICAN: >60 ML/MIN/1.73
GLUCOSE BLD-MCNC: 92 MG/DL (ref 74–99)
GLUCOSE URINE: NEGATIVE MG/DL
HBA1C MFR BLD: 5.3 % (ref 4–5.6)
HCT VFR BLD CALC: 42.3 % (ref 37–54)
HDLC SERPL-MCNC: 50 MG/DL
HEMOGLOBIN: 13.7 G/DL (ref 12.5–16.5)
IMMATURE GRANULOCYTES #: 0.03 E9/L
IMMATURE GRANULOCYTES %: 0.4 % (ref 0–5)
KETONES, URINE: NEGATIVE MG/DL
LDL CHOLESTEROL CALCULATED: 68 MG/DL (ref 0–99)
LEUKOCYTE ESTERASE, URINE: NEGATIVE
LYMPHOCYTES ABSOLUTE: 1.66 E9/L (ref 1.5–4)
LYMPHOCYTES RELATIVE PERCENT: 19.8 % (ref 20–42)
MCH RBC QN AUTO: 32.1 PG (ref 26–35)
MCHC RBC AUTO-ENTMCNC: 32.4 % (ref 32–34.5)
MCV RBC AUTO: 99.1 FL (ref 80–99.9)
MONOCYTES ABSOLUTE: 0.67 E9/L (ref 0.1–0.95)
MONOCYTES RELATIVE PERCENT: 8 % (ref 2–12)
NEUTROPHILS ABSOLUTE: 4.51 E9/L (ref 1.8–7.3)
NEUTROPHILS RELATIVE PERCENT: 53.6 % (ref 43–80)
NITRITE, URINE: NEGATIVE
PDW BLD-RTO: 13.6 FL (ref 11.5–15)
PH UA: 5.5 (ref 5–9)
PLATELET # BLD: 122 E9/L (ref 130–450)
PMV BLD AUTO: 11.2 FL (ref 7–12)
POTASSIUM SERPL-SCNC: 4.6 MMOL/L (ref 3.5–5)
PROSTATE SPECIFIC ANTIGEN: 1.05 NG/ML (ref 0–4)
PROTEIN UA: NEGATIVE MG/DL
RBC # BLD: 4.27 E12/L (ref 3.8–5.8)
RHEUMATOID FACTOR: 46 IU/ML (ref 0–13)
SODIUM BLD-SCNC: 143 MMOL/L (ref 132–146)
SPECIFIC GRAVITY UA: 1.02 (ref 1–1.03)
T4 TOTAL: 8.1 MCG/DL (ref 4.5–11.7)
TOTAL PROTEIN: 7.1 G/DL (ref 6.4–8.3)
TRIGL SERPL-MCNC: 51 MG/DL (ref 0–149)
TSH SERPL DL<=0.05 MIU/L-ACNC: 0.88 UIU/ML (ref 0.27–4.2)
URIC ACID, SERUM: 6.3 MG/DL (ref 3.4–7)
UROBILINOGEN, URINE: 0.2 E.U./DL
VITAMIN B-12: 366 PG/ML (ref 211–946)
VITAMIN D 25-HYDROXY: 30 NG/ML (ref 30–100)
VLDLC SERPL CALC-MCNC: 10 MG/DL
WBC # BLD: 8.4 E9/L (ref 4.5–11.5)

## 2022-02-14 LAB — ANTI-NUCLEAR ANTIBODY (ANA): NEGATIVE

## 2022-02-17 ENCOUNTER — OFFICE VISIT (OUTPATIENT)
Dept: PRIMARY CARE CLINIC | Age: 72
End: 2022-02-17
Payer: MEDICARE

## 2022-02-17 VITALS
TEMPERATURE: 97.3 F | SYSTOLIC BLOOD PRESSURE: 127 MMHG | DIASTOLIC BLOOD PRESSURE: 78 MMHG | BODY MASS INDEX: 24.99 KG/M2 | WEIGHT: 150 LBS | OXYGEN SATURATION: 96 % | HEART RATE: 58 BPM | HEIGHT: 65 IN

## 2022-02-17 DIAGNOSIS — D51.8 VITAMIN B12 DEFICIENCY (DIETARY) ANEMIA: ICD-10-CM

## 2022-02-17 DIAGNOSIS — M05.79 RHEUMATOID ARTHRITIS INVOLVING MULTIPLE SITES WITH POSITIVE RHEUMATOID FACTOR (HCC): ICD-10-CM

## 2022-02-17 DIAGNOSIS — D69.6 THROMBOCYTOPENIA, UNSPECIFIED (HCC): ICD-10-CM

## 2022-02-17 DIAGNOSIS — I10 ESSENTIAL HYPERTENSION: Primary | ICD-10-CM

## 2022-02-17 DIAGNOSIS — I48.0 PAROXYSMAL ATRIAL FIBRILLATION (HCC): ICD-10-CM

## 2022-02-17 DIAGNOSIS — I25.10 ATHEROSCLEROSIS OF NATIVE CORONARY ARTERY OF NATIVE HEART WITHOUT ANGINA PECTORIS: ICD-10-CM

## 2022-02-17 DIAGNOSIS — M81.0 AGE-RELATED OSTEOPOROSIS WITHOUT CURRENT PATHOLOGICAL FRACTURE: ICD-10-CM

## 2022-02-17 DIAGNOSIS — E78.2 MIXED HYPERLIPIDEMIA: ICD-10-CM

## 2022-02-17 DIAGNOSIS — J43.8 OTHER EMPHYSEMA (HCC): ICD-10-CM

## 2022-02-17 DIAGNOSIS — E55.9 VITAMIN D DEFICIENCY: ICD-10-CM

## 2022-02-17 DIAGNOSIS — E11.9 DIET-CONTROLLED DIABETES MELLITUS (HCC): ICD-10-CM

## 2022-02-17 PROCEDURE — 3023F SPIROM DOC REV: CPT | Performed by: FAMILY MEDICINE

## 2022-02-17 PROCEDURE — 1123F ACP DISCUSS/DSCN MKR DOCD: CPT | Performed by: FAMILY MEDICINE

## 2022-02-17 PROCEDURE — G8428 CUR MEDS NOT DOCUMENT: HCPCS | Performed by: FAMILY MEDICINE

## 2022-02-17 PROCEDURE — 3044F HG A1C LEVEL LT 7.0%: CPT | Performed by: FAMILY MEDICINE

## 2022-02-17 PROCEDURE — 2022F DILAT RTA XM EVC RTNOPTHY: CPT | Performed by: FAMILY MEDICINE

## 2022-02-17 PROCEDURE — G8420 CALC BMI NORM PARAMETERS: HCPCS | Performed by: FAMILY MEDICINE

## 2022-02-17 PROCEDURE — 99214 OFFICE O/P EST MOD 30 MIN: CPT | Performed by: FAMILY MEDICINE

## 2022-02-17 PROCEDURE — G8484 FLU IMMUNIZE NO ADMIN: HCPCS | Performed by: FAMILY MEDICINE

## 2022-02-17 PROCEDURE — 1036F TOBACCO NON-USER: CPT | Performed by: FAMILY MEDICINE

## 2022-02-17 PROCEDURE — 4040F PNEUMOC VAC/ADMIN/RCVD: CPT | Performed by: FAMILY MEDICINE

## 2022-02-17 PROCEDURE — 3017F COLORECTAL CA SCREEN DOC REV: CPT | Performed by: FAMILY MEDICINE

## 2022-02-17 ASSESSMENT — ENCOUNTER SYMPTOMS
EYES NEGATIVE: 1
GASTROINTESTINAL NEGATIVE: 1
ALLERGIC/IMMUNOLOGIC NEGATIVE: 1
RESPIRATORY NEGATIVE: 1

## 2022-02-17 NOTE — PROGRESS NOTES
22  Name: Cleopatra Fair    : 1950    Sex: male    Age: 73 y.o. Subjective:  Chief Complaint: Patient is here for  Three week ck   Re  mulc isuses     Kam  Chol a f        Mitral valve  Chol  RA  Mac  degen    Feel wes    No cp ro sob     La bntoed   ra up  Nakia ok   caopy for pt   Die texer          Review of Systems   Constitutional: Negative. HENT: Negative. Eyes: Negative. Respiratory: Negative. Cardiovascular: Negative. Gastrointestinal: Negative. Endocrine: Negative. Genitourinary: Negative. Musculoskeletal: Negative. Skin: Negative. Allergic/Immunologic: Negative. Neurological: Negative. Hematological: Negative. Psychiatric/Behavioral: Negative.           Current Outpatient Medications:     albuterol (PROVENTIL) (2.5 MG/3ML) 0.083% nebulizer solution, Take 3 mLs by nebulization every 6 hours as needed for Wheezing, Disp: 360 each, Rfl: 3    chlorpheniramine (ALLER-CHLOR) 4 MG tablet, Take 1 tablet by mouth every 6 hours as needed for Allergies, Disp: 20 tablet, Rfl: 0    Calcium Carbonate-Vit D-Min (CALCIUM 1200 PO), Take by mouth, Disp: , Rfl:     Albuterol Sulfate (PROAIR HFA IN), Inhale into the lungs, Disp: , Rfl:     tamsulosin (FLOMAX) 0.4 MG capsule, Take 0.4 mg by mouth daily, Disp: , Rfl:     calcium carbonate 600 MG TABS tablet, Take 1 tablet by mouth daily, Disp: , Rfl:     mometasone-formoterol (DULERA) 200-5 MCG/ACT inhaler, Inhale 2 puffs into the lungs every 12 hours, Disp: , Rfl:     fluticasone (FLONASE) 50 MCG/ACT nasal spray, 1 spray by Each Nostril route daily, Disp: , Rfl:     Multiple Vitamins-Minerals (PRESERVISION AREDS 2+MULTI VIT PO), Take 1 capsule by mouth daily, Disp: , Rfl:     acetaminophen (TYLENOL) 500 MG tablet, Take 500 mg by mouth every 6 hours as needed for Pain, Disp: , Rfl:     aspirin 81 MG chewable tablet, Take 81 mg by mouth daily, Disp: , Rfl:     atorvastatin (LIPITOR) 10 MG tablet, Take 10 mg by mouth nightly, Disp: , Rfl:   Allergies   Allergen Reactions    Statins [Statins]      Caused muscle aches and weakness. Social History     Socioeconomic History    Marital status:      Spouse name: Not on file    Number of children: Not on file    Years of education: Not on file    Highest education level: Not on file   Occupational History    Not on file   Tobacco Use    Smoking status: Former Smoker     Packs/day: 2.50     Years: 2.00     Pack years: 5.00     Start date:      Quit date: 1975     Years since quittin.4    Smokeless tobacco: Never Used    Tobacco comment: Quit in 67. Vaping Use    Vaping Use: Never used   Substance and Sexual Activity    Alcohol use: No     Comment: not in 5 years    Drug use: No    Sexual activity: Not on file   Other Topics Concern    Not on file   Social History Narrative    Adopted    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   Was on prednisone for 40 years has been off for 7 years    Macular degeneration with loss of vision left eye-----sees   eye care    Coronary artery disease sees Dr. Gale Schilder at [facility]    Aortic valve surgery with repair of PFO 2020 [facility]    Hepatitis B from his work sees gastroenterology [place] [facility]    Kidney stones    Restless leg syndrome    Rheumatoid arthritis sees rheumatology [facility]    Ultrasound 2020 on clinical clinic mentions gallbladder sludge which she is unaware    BPH sees Dr. Tierra Kruger sees Dr. Samaria Hudson    S/p multiple hernias    Colonoscopy will be due soon with Dr. Claryce Hodgkins    Paroxysmal atrial fibrillation    Mitral regurgitation     Social Determinants of Health     Financial Resource Strain:     Difficulty of Paying Living Expenses: Not on file   Food Insecurity:     Worried About Running Out of Food in the Last Year: Not on file    Annia [employer] in the Last Year: Not on file   Transportation Needs:     Lack of Transportation (Medical): Not on file    Lack of Transportation (Non-Medical): Not on file   Physical Activity:     Days of Exercise per Week: Not on file    Minutes of Exercise per Session: Not on file   Stress:     Feeling of Stress : Not on file   Social Connections:     Frequency of Communication with Friends and Family: Not on file    Frequency of Social Gatherings with Friends and Family: Not on file    Attends Sabianist Services: Not on file    Active Member of [address] [place] [address] [employer] or Organizations: Not on file    Attends Club or Organization Meetings: Not on file    Marital Status: Not on file   Intimate Partner Violence:     Fear of Current or Ex-Partner: Not on file    Emotionally Abused: Not on file    Physically Abused: Not on file    Sexually Abused: Not on file   Housing Stability:     Unable to Pay for Housing in the Last Year: Not on file    Number of [place] in the Last Year: Not on file    Unstable Housing in the Last Year: Not on file      Past Medical History:   Diagnosis Date    [facility]     Arthritis     rheumatoid    Asthma 1974    status asthmaticus in 3/2004.  Cancer ([facility] [address]     basal cell to face    Deviated septum     S/P repair x 3.    Hepatitis B     Hypercholesterolemia     Intolerance to statins.  Hypertension     Kidney stone     Restless leg syndrome 3/2004    Negative sleep study for sleep apnea. Positive for restless leg syndrome.     Rheumatoid arthritis(714.0)      Family History   Adopted: Yes      Past Surgical History:   Procedure Laterality Date    ADENOIDECTOMY      CARDIAC CATHETERIZATION      CARDIAC VALVE SURGERY  06/16/2020    AVR and repair of ascending aortic aneurysm in [facility] TEST  1994    Stress-induced ischemia involving the interventricular septum    CATARACT REMOVAL Bilateral 2002    COLONOSCOPY      CYST REMOVAL      DIAGNOSTIC CARDIAC CATH LAB PROCEDURE      Normal per patient (this was done by Dr. Jose Colvin and records are not available.)    HERNIA REPAIR Left     HERNIA REPAIR      Right luminal hernia repair.  SKIN BIOPSY  03/27/2018    Dr Rai Lara cheek-ulcerated basal cell carcinoma    TONSILLECTOMY      VASECTOMY        Vitals:    02/17/22 0842   BP: 127/78   Pulse: 58   Temp: 97.3 °F (36.3 °C)   TempSrc: Infrared   SpO2: 96%   Weight: 150 lb (68 kg)   Height: 5' 5\" (1.651 m)       Objective:    Physical Exam  Vitals reviewed. Constitutional:       Appearance: He is well-developed. HENT:      Head: Normocephalic. Eyes:      Pupils: Pupils are equal, round, and reactive to light. Cardiovascular:      Rate and Rhythm: Normal rate and regular rhythm. Pulmonary:      Effort: Pulmonary effort is normal.      Breath sounds: Normal breath sounds. Abdominal:      Palpations: Abdomen is soft. Musculoskeletal:         General: Normal range of motion. Cervical back: Normal range of motion. Skin:     General: Skin is warm. Neurological:      Mental Status: He is alert and oriented to person, place, and time. Psychiatric:         Behavior: Behavior normal.         Elda Contreras was seen today for discuss labs. Diagnoses and all orders for this visit:    Essential hypertension  -     CBC with Auto Differential; Future  -     Comprehensive Metabolic Panel; Future  -     Hemoglobin A1C; Future  -     Lipid Panel; Future  -     Vitamin B12; Future  -     Vitamin D 25 Hydroxy; Future  -     Urinalysis; Future    Mixed hyperlipidemia  -     CBC with Auto Differential; Future  -     Comprehensive Metabolic Panel; Future  -     Hemoglobin A1C; Future  -     Lipid Panel;  Future  -     Vitamin B12; Future  -     Vitamin D reviewing the chart before entering the room with patient and finishing charting after leaving patient's room. More than half of that time was spent face to face with the patient in counseling and coordinating care. Follow Up: Return in about 6 months (around 8/17/2022) for Lab Before.      Seen by:  Tania Lane DO

## 2022-02-18 ENCOUNTER — HOSPITAL ENCOUNTER (EMERGENCY)
Age: 72
Discharge: HOME | End: 2022-02-18
Attending: EMERGENCY MEDICINE
Payer: MEDICARE

## 2022-02-18 ENCOUNTER — APPOINTMENT (OUTPATIENT)
Dept: GENERAL RADIOLOGY | Age: 72
End: 2022-02-18
Payer: MEDICARE

## 2022-02-18 VITALS
OXYGEN SATURATION: 96 % | RESPIRATION RATE: 16 BRPM | HEART RATE: 58 BPM | DIASTOLIC BLOOD PRESSURE: 80 MMHG | SYSTOLIC BLOOD PRESSURE: 164 MMHG | TEMPERATURE: 97.2 F

## 2022-02-18 DIAGNOSIS — S60.459A FOREIGN BODY IN SKIN OF FINGER, INITIAL ENCOUNTER: Primary | ICD-10-CM

## 2022-02-18 DIAGNOSIS — Z23 NEED FOR TETANUS BOOSTER: ICD-10-CM

## 2022-02-18 PROBLEM — E55.9 VITAMIN D DEFICIENCY: Status: ACTIVE | Noted: 2022-02-18

## 2022-02-18 PROBLEM — I25.10 CORONARY ATHEROSCLEROSIS: Chronic | Status: ACTIVE | Noted: 2018-04-03

## 2022-02-18 PROBLEM — D69.6 THROMBOCYTOPENIA, UNSPECIFIED (HCC): Status: ACTIVE | Noted: 2022-02-18

## 2022-02-18 PROBLEM — E78.5 HYPERLIPEMIA: Chronic | Status: ACTIVE | Noted: 2018-04-03

## 2022-02-18 PROBLEM — I48.0 PAROXYSMAL ATRIAL FIBRILLATION (HCC): Status: ACTIVE | Noted: 2021-05-09

## 2022-02-18 PROBLEM — D51.8 VITAMIN B12 DEFICIENCY (DIETARY) ANEMIA: Status: ACTIVE | Noted: 2022-02-18

## 2022-02-18 PROBLEM — I48.0 PAROXYSMAL ATRIAL FIBRILLATION (HCC): Chronic | Status: ACTIVE | Noted: 2021-05-09

## 2022-02-18 PROCEDURE — 90714 TD VACC NO PRESV 7 YRS+ IM: CPT | Performed by: NURSE PRACTITIONER

## 2022-02-18 PROCEDURE — 73140 X-RAY EXAM OF FINGER(S): CPT

## 2022-02-18 PROCEDURE — 6360000002 HC RX W HCPCS: Performed by: NURSE PRACTITIONER

## 2022-02-18 PROCEDURE — 99284 EMERGENCY DEPT VISIT MOD MDM: CPT

## 2022-02-18 PROCEDURE — 6370000000 HC RX 637 (ALT 250 FOR IP): Performed by: NURSE PRACTITIONER

## 2022-02-18 PROCEDURE — 90471 IMMUNIZATION ADMIN: CPT | Performed by: NURSE PRACTITIONER

## 2022-02-18 PROCEDURE — 10120 INC&RMVL FB SUBQ TISS SMPL: CPT

## 2022-02-18 PROCEDURE — 2500000003 HC RX 250 WO HCPCS: Performed by: NURSE PRACTITIONER

## 2022-02-18 RX ORDER — CEPHALEXIN 500 MG/1
500 CAPSULE ORAL 4 TIMES DAILY
Qty: 27 CAPSULE | Refills: 0 | Status: SHIPPED | OUTPATIENT
Start: 2022-02-18 | End: 2022-02-18 | Stop reason: SDUPTHER

## 2022-02-18 RX ORDER — CEPHALEXIN 500 MG/1
500 CAPSULE ORAL ONCE
Status: COMPLETED | OUTPATIENT
Start: 2022-02-18 | End: 2022-02-18

## 2022-02-18 RX ORDER — LIDOCAINE HYDROCHLORIDE 10 MG/ML
5 INJECTION, SOLUTION INFILTRATION; PERINEURAL ONCE
Status: COMPLETED | OUTPATIENT
Start: 2022-02-18 | End: 2022-02-18

## 2022-02-18 RX ORDER — TETANUS AND DIPHTHERIA TOXOIDS ADSORBED 2; 2 [LF]/.5ML; [LF]/.5ML
0.5 INJECTION INTRAMUSCULAR ONCE
Status: COMPLETED | OUTPATIENT
Start: 2022-02-18 | End: 2022-02-18

## 2022-02-18 RX ORDER — CEPHALEXIN 500 MG/1
500 CAPSULE ORAL 4 TIMES DAILY
Qty: 27 CAPSULE | Refills: 0 | Status: SHIPPED | OUTPATIENT
Start: 2022-02-18 | End: 2022-02-25

## 2022-02-18 RX ADMIN — TETANUS AND DIPHTHERIA TOXOIDS ADSORBED 0.5 ML: 2; 2 INJECTION INTRAMUSCULAR at 18:00

## 2022-02-18 RX ADMIN — LIDOCAINE HYDROCHLORIDE 5 ML: 10 INJECTION, SOLUTION INFILTRATION; PERINEURAL at 18:51

## 2022-02-18 RX ADMIN — CEPHALEXIN 500 MG: 500 CAPSULE ORAL at 20:00

## 2022-02-18 ASSESSMENT — PAIN SCALES - GENERAL
PAINLEVEL_OUTOF10: 5
PAINLEVEL_OUTOF10: 5

## 2022-02-18 NOTE — ED PROVIDER NOTES
ED Attending shared visit  CC: No         [facility]  Department of Emergency Medicine   ED  Encounter Note  Admit Date/RoomTime: 2022  5:49 PM  ED Room: [place] [address]    NAME: Conchis Fofana  : 1950  MRN: [id number]     Chief Complaint:  Hand Injury (pt was putting up trim in his house and put a nail through RIGHT index finger)    History of Present Illness        Conchis Fofana is a 73 y.o. old male who presents to the emergency department by private vehicle, for known foreign body(s) in the right index finger as a result of a nail , which occured 2 hour(s) prior to arrival.  Attempted removal was not performed prior to arrival.   Since onset the symptoms have been remaining constant and is painful. The site is/has surrounding tenderness. Tetanus Status: unknown. Associated Signs & Symptoms: none    []  Discharge     []  Bleeding     []  Fever/Chills     ROS   Pertinent positives and negatives are stated within HPI, all other systems reviewed and are negative. Past Medical History:  has a past medical history of Aneurysm artery, pulmonary ([address] Arthritis, Asthma, Cancer ([address] Deviated septum, Hepatitis B, Hypercholesterolemia, Hypertension, Kidney stone, Restless leg syndrome, and Rheumatoid arthritis(714.0). Surgical History:  has a past surgical history that includes hernia repair (Left); cardiovascular stress test (); Tonsillectomy; hernia repair; Cataract removal (Bilateral, ); Colonoscopy; Diagnostic Cardiac Cath Lab Procedure; Vasectomy; Adenoidectomy; cyst removal; skin biopsy (2018); Cardiac valuve replacement (2020); and Cardiac catheterization. Social History:  reports that he quit smoking about 46 years ago. He started smoking about 56 years ago. He has a 5.00 pack-year smoking history. He has never used smokeless tobacco. He reports that he does not drink alcohol and does not use drugs. Family History: family history is not on file.  He was adopted. Allergies: Statins [statins]    Physical Exam   Oxygen Saturation Interpretation: Normal.        ED Triage Vitals   BP Temp Temp Source Pulse Resp SpO2 Height Weight   02/18/22 1745 02/18/22 1729 02/18/22 1729 02/18/22 1729 02/18/22 1729 02/18/22 1729 -- --   (!) 173/97 97.8 °F (36.6 °C) Oral 64 18 97 %           Constitutional:  Alert, development consistent with age. Neck:  Normal ROM. Supple. Non-tender. Fingers:  Right Index finger distal phalanx aspect            Tenderness:  moderate. Swelling: None. Deformity: no deformity observed/palpated. ROM: full range of motion, full range with pain. Skin:  Nail through distal finger. Neurovascular: Motor deficit: none. Sensory deficit:   none. Pulse deficit: none. Capillary refill: normal.  Right Hand: all metacarpals. Tenderness: none. Swelling: None. Deformity: no.             Skin:  no wounds, erythema, or swelling. Right Wrist:               Tenderness:  none. Negative snuffbox tenderness. Swelling: None. Deformity: no deformity observed/palpated. ROM: full range of motion. Skin:  no wounds, erythema, or swelling. Lymphatics: No lymphangitis or adenopathy noted. Neurological:  Oriented. Motor functions intact. t. Lab / Imaging Results   (All laboratory and radiology results have been personally reviewed by myself)  Labs:  No results found for this visit on 02/18/22. Imaging: All Radiology results interpreted by Radiologist unless otherwise noted. XR FINGER RIGHT (MIN 2 VIEWS)   Final Result   No acute fracture or radiopaque foreign body in the right 2nd digit. XR FINGER RIGHT (MIN 2 VIEWS)   Final Result   1. Metallic nail through the distal aspect of the 2nd distal phalanx.    2. Scattered osteoarthritis in the visualized portions of the right hand. ED Course / Medical Decision Making     Medications   diptheria-tetanus toxoids [facility]) 2-2 LF/0.5ML injection 0.5 mL (0.5 mLs IntraMUSCular Given 2/18/22 1800)   lidocaine 1 % injection 5 mL (5 mLs IntraDERmal Given by Other 2/18/22 1851)   cephALEXin (KEFLEX) capsule 500 mg (500 mg Oral Given 2/18/22 2000)        Consult(s):   None    Procedure(s):  PROCEDURE  2/18/22       Time: 1904    FOREIGN BODY REMOVAL  Risks, benefits and alternatives (for applicable procedures below) described. Performed By: VAL Ferrell CNP and EM Attending Physician. Indication:  Nail through finger. Location:   Right index finger . Informed consent: Written consent obtained. The patient was counseled regarding the procedure in person, it's indications, risks, potential complications and alternatives and any questions were answered. Consent was obtained. .  Prep: The skin was irrigated with sterile saline, cleansed with povidone iodine and draped in a sterile fashion. Local Anesthesia:  obtained with Lidocaine 1% without epinephrine. Foreign Body was: removed using pulled through with plyers and had the appearance of metal nail. Ultrasound Guidance:   not used. Complications:  None. Patient tolerated the procedure well. MDM: Patient presents to the ED for nail through his right index finger. Differential diagnoses included but not limited to foreign body with or without fracture of the finger. Workup in the ED revealed initial x-ray revealed a metallic nail through a distal aspect of the second finger with no signs of fracture or dislocation. Foreign body was removed and x-ray reveals no acute fracture or foreign body of the right second digit. Bleeding is controlled. Compartments are soft and compressible. Tetanus shot was up dated today. Patient continues to be non-toxic on re-evaluation.  Findings were discussed with the patient and reasons to immediately return to the ED were articulated to them. They will follow-up with their PMD and orthopedics. Plan of Care/Counseling:  VAL Blanca CNP reviewed today's visit with the patient in addition to providing specific details for the plan of care and counseling regarding the diagnosis and prognosis. Questions are answered at this time and are agreeable with the plan. Assessment      1. Foreign body in skin of finger, initial encounter    2. Need for tetanus booster      Plan   Discharged home. Patient condition is stable    New Medications     Current Discharge Medication List      START taking these medications    Details   cephALEXin (KEFLEX) 500 MG capsule Take 1 capsule by mouth 4 times daily for 7 days  Qty: 27 capsule, Refills: 0           Electronically signed by VAL Blanca CNP   DD: 2/18/22  **This report was transcribed using voice recognition software. Every effort was made to ensure accuracy; however, inadvertent computerized transcription errors may be present.   END OF ED PROVIDER NOTE       VAL Blanca CNP  02/18/22 2011

## 2022-02-28 ENCOUNTER — HOSPITAL ENCOUNTER (OUTPATIENT)
Dept: CARDIAC REHAB | Age: 72
Discharge: HOME | End: 2022-02-28

## 2022-03-10 PROCEDURE — 9900000038 HC CARDIAC REHAB PHASE 3 - MONTHLY

## 2022-03-28 ENCOUNTER — HOSPITAL ENCOUNTER (OUTPATIENT)
Dept: CARDIAC REHAB | Age: 72
Discharge: HOME | End: 2022-03-28

## 2022-04-04 PROCEDURE — 9900000038 HC CARDIAC REHAB PHASE 3 - MONTHLY

## 2022-04-23 ENCOUNTER — HOSPITAL ENCOUNTER (INPATIENT)
Age: 72
LOS: 2 days | Discharge: HOME | DRG: 871 | End: 2022-04-25
Attending: EMERGENCY MEDICINE | Admitting: FAMILY MEDICINE
Payer: MEDICARE

## 2022-04-23 ENCOUNTER — APPOINTMENT (OUTPATIENT)
Dept: GENERAL RADIOLOGY | Age: 72
DRG: 871 | End: 2022-04-23
Payer: MEDICARE

## 2022-04-23 ENCOUNTER — APPOINTMENT (OUTPATIENT)
Dept: CT IMAGING | Age: 72
DRG: 871 | End: 2022-04-23
Payer: MEDICARE

## 2022-04-23 DIAGNOSIS — J18.9 COMMUNITY ACQUIRED PNEUMONIA OF LEFT LOWER LOBE OF LUNG: ICD-10-CM

## 2022-04-23 DIAGNOSIS — J96.01 ACUTE RESPIRATORY FAILURE WITH HYPOXIA (HCC): Primary | ICD-10-CM

## 2022-04-23 PROBLEM — J96.00 ACUTE RESPIRATORY FAILURE (HCC): Status: ACTIVE | Noted: 2022-04-23

## 2022-04-23 LAB
ADENOVIRUS BY PCR: NOT DETECTED
ALBUMIN SERPL-MCNC: 4.4 G/DL (ref 3.5–5.2)
ALP BLD-CCNC: 59 U/L (ref 40–129)
ALT SERPL-CCNC: 18 U/L (ref 0–40)
ANION GAP SERPL CALCULATED.3IONS-SCNC: 15 MMOL/L (ref 7–16)
AST SERPL-CCNC: 20 U/L (ref 0–39)
BASOPHILS ABSOLUTE: 0.11 E9/L (ref 0–0.2)
BASOPHILS RELATIVE PERCENT: 0.9 % (ref 0–2)
BILIRUB SERPL-MCNC: 3.9 MG/DL (ref 0–1.2)
BILIRUBIN DIRECT: 0.3 MG/DL (ref 0–0.3)
BILIRUBIN, INDIRECT: 3.6 MG/DL (ref 0–1)
BORDETELLA PARAPERTUSSIS BY PCR: NOT DETECTED
BORDETELLA PERTUSSIS BY PCR: NOT DETECTED
BUN BLDV-MCNC: 17 MG/DL (ref 6–23)
BURR CELLS: ABNORMAL
CALCIUM SERPL-MCNC: 9.6 MG/DL (ref 8.6–10.2)
CHLAMYDOPHILIA PNEUMONIAE BY PCR: NOT DETECTED
CHLORIDE BLD-SCNC: 101 MMOL/L (ref 98–107)
CO2: 22 MMOL/L (ref 22–29)
CORONAVIRUS 229E BY PCR: NOT DETECTED
CORONAVIRUS HKU1 BY PCR: NOT DETECTED
CORONAVIRUS NL63 BY PCR: NOT DETECTED
CORONAVIRUS OC43 BY PCR: NOT DETECTED
CREAT SERPL-MCNC: 0.9 MG/DL (ref 0.7–1.2)
EKG ATRIAL RATE: 73 BPM
EKG P AXIS: 19 DEGREES
EKG P-R INTERVAL: 162 MS
EKG Q-T INTERVAL: 370 MS
EKG QRS DURATION: 102 MS
EKG QTC CALCULATION (BAZETT): 407 MS
EKG R AXIS: -26 DEGREES
EKG T AXIS: 83 DEGREES
EKG VENTRICULAR RATE: 73 BPM
EOSINOPHILS ABSOLUTE: 0 E9/L (ref 0.05–0.5)
EOSINOPHILS RELATIVE PERCENT: 0 % (ref 0–6)
GFR AFRICAN AMERICAN: >60
GFR NON-AFRICAN AMERICAN: >60 ML/MIN/1.73
GLUCOSE BLD-MCNC: 121 MG/DL (ref 74–99)
HCT VFR BLD CALC: 45 % (ref 37–54)
HEMOGLOBIN: 14.9 G/DL (ref 12.5–16.5)
HUMAN METAPNEUMOVIRUS BY PCR: NOT DETECTED
HUMAN RHINOVIRUS/ENTEROVIRUS BY PCR: NOT DETECTED
INFLUENZA A BY PCR: NOT DETECTED
INFLUENZA A BY PCR: NOT DETECTED
INFLUENZA B BY PCR: NOT DETECTED
INFLUENZA B BY PCR: NOT DETECTED
LYMPHOCYTES ABSOLUTE: 0.5 E9/L (ref 1.5–4)
LYMPHOCYTES RELATIVE PERCENT: 3.5 % (ref 20–42)
MAGNESIUM: 1.8 MG/DL (ref 1.6–2.6)
MCH RBC QN AUTO: 32.1 PG (ref 26–35)
MCHC RBC AUTO-ENTMCNC: 33.1 % (ref 32–34.5)
MCV RBC AUTO: 97 FL (ref 80–99.9)
MONOCYTES ABSOLUTE: 0.5 E9/L (ref 0.1–0.95)
MONOCYTES RELATIVE PERCENT: 4.3 % (ref 2–12)
MYCOPLASMA PNEUMONIAE BY PCR: NOT DETECTED
NEUTROPHILS ABSOLUTE: 11.38 E9/L (ref 1.8–7.3)
NEUTROPHILS RELATIVE PERCENT: 91.3 % (ref 43–80)
NUCLEATED RED BLOOD CELLS: 0 /100 WBC
OVALOCYTES: ABNORMAL
PARAINFLUENZA VIRUS 1 BY PCR: NOT DETECTED
PARAINFLUENZA VIRUS 2 BY PCR: NOT DETECTED
PARAINFLUENZA VIRUS 3 BY PCR: NOT DETECTED
PARAINFLUENZA VIRUS 4 BY PCR: NOT DETECTED
PDW BLD-RTO: 13.1 FL (ref 11.5–15)
PLATELET # BLD: 122 E9/L (ref 130–450)
PMV BLD AUTO: 10.6 FL (ref 7–12)
POIKILOCYTES: ABNORMAL
POLYCHROMASIA: ABNORMAL
POTASSIUM SERPL-SCNC: 3.9 MMOL/L (ref 3.5–5)
PROCALCITONIN: 0.11 NG/ML (ref 0–0.08)
RBC # BLD: 4.64 E12/L (ref 3.8–5.8)
RESPIRATORY SYNCYTIAL VIRUS BY PCR: NOT DETECTED
SARS-COV-2, NAAT: NOT DETECTED
SARS-COV-2, PCR: NOT DETECTED
SODIUM BLD-SCNC: 138 MMOL/L (ref 132–146)
TOTAL PROTEIN: 7.3 G/DL (ref 6.4–8.3)
TROPONIN, HIGH SENSITIVITY: 23 NG/L (ref 0–11)
WBC # BLD: 12.5 E9/L (ref 4.5–11.5)

## 2022-04-23 PROCEDURE — 71275 CT ANGIOGRAPHY CHEST: CPT

## 2022-04-23 PROCEDURE — 83735 ASSAY OF MAGNESIUM: CPT

## 2022-04-23 PROCEDURE — 2500000003 HC RX 250 WO HCPCS: Performed by: EMERGENCY MEDICINE

## 2022-04-23 PROCEDURE — 87502 INFLUENZA DNA AMP PROBE: CPT

## 2022-04-23 PROCEDURE — 6360000004 HC RX CONTRAST MEDICATION: Performed by: RADIOLOGY

## 2022-04-23 PROCEDURE — 2580000003 HC RX 258: Performed by: EMERGENCY MEDICINE

## 2022-04-23 PROCEDURE — 80048 BASIC METABOLIC PNL TOTAL CA: CPT

## 2022-04-23 PROCEDURE — 36415 COLL VENOUS BLD VENIPUNCTURE: CPT

## 2022-04-23 PROCEDURE — 96374 THER/PROPH/DIAG INJ IV PUSH: CPT

## 2022-04-23 PROCEDURE — 94664 DEMO&/EVAL PT USE INHALER: CPT

## 2022-04-23 PROCEDURE — 80076 HEPATIC FUNCTION PANEL: CPT

## 2022-04-23 PROCEDURE — 71045 X-RAY EXAM CHEST 1 VIEW: CPT

## 2022-04-23 PROCEDURE — 84484 ASSAY OF TROPONIN QUANT: CPT

## 2022-04-23 PROCEDURE — 87040 BLOOD CULTURE FOR BACTERIA: CPT

## 2022-04-23 PROCEDURE — 87635 SARS-COV-2 COVID-19 AMP PRB: CPT

## 2022-04-23 PROCEDURE — 6360000002 HC RX W HCPCS: Performed by: EMERGENCY MEDICINE

## 2022-04-23 PROCEDURE — 0202U NFCT DS 22 TRGT SARS-COV-2: CPT

## 2022-04-23 PROCEDURE — 85025 COMPLETE CBC W/AUTO DIFF WBC: CPT

## 2022-04-23 PROCEDURE — 84145 PROCALCITONIN (PCT): CPT

## 2022-04-23 PROCEDURE — 2580000003 HC RX 258: Performed by: PHYSICIAN ASSISTANT

## 2022-04-23 PROCEDURE — 6370000000 HC RX 637 (ALT 250 FOR IP): Performed by: PHYSICIAN ASSISTANT

## 2022-04-23 PROCEDURE — 93005 ELECTROCARDIOGRAM TRACING: CPT | Performed by: EMERGENCY MEDICINE

## 2022-04-23 PROCEDURE — 99285 EMERGENCY DEPT VISIT HI MDM: CPT

## 2022-04-23 PROCEDURE — 2700000000 HC OXYGEN THERAPY PER DAY

## 2022-04-23 PROCEDURE — APPSS45 APP SPLIT SHARED TIME 31-45 MINUTES: Performed by: PHYSICIAN ASSISTANT

## 2022-04-23 PROCEDURE — 6360000002 HC RX W HCPCS: Performed by: PHYSICIAN ASSISTANT

## 2022-04-23 PROCEDURE — 94640 AIRWAY INHALATION TREATMENT: CPT

## 2022-04-23 PROCEDURE — 93010 ELECTROCARDIOGRAM REPORT: CPT | Performed by: INTERNAL MEDICINE

## 2022-04-23 PROCEDURE — 99222 1ST HOSP IP/OBS MODERATE 55: CPT | Performed by: FAMILY MEDICINE

## 2022-04-23 PROCEDURE — 1200000000 HC SEMI PRIVATE

## 2022-04-23 RX ORDER — SODIUM CHLORIDE 0.9 % (FLUSH) 0.9 %
5-40 SYRINGE (ML) INJECTION PRN
Status: DISCONTINUED | OUTPATIENT
Start: 2022-04-23 | End: 2022-04-25 | Stop reason: HOSPADM

## 2022-04-23 RX ORDER — LEVOFLOXACIN 5 MG/ML
750 INJECTION, SOLUTION INTRAVENOUS EVERY 24 HOURS
Status: DISCONTINUED | OUTPATIENT
Start: 2022-04-23 | End: 2022-04-25 | Stop reason: HOSPADM

## 2022-04-23 RX ORDER — LANOLIN ALCOHOL/MO/W.PET/CERES
1000 CREAM (GRAM) TOPICAL DAILY
COMMUNITY

## 2022-04-23 RX ORDER — SODIUM CHLORIDE 0.9 % (FLUSH) 0.9 %
10 SYRINGE (ML) INJECTION PRN
Status: DISCONTINUED | OUTPATIENT
Start: 2022-04-23 | End: 2022-04-23 | Stop reason: SDUPTHER

## 2022-04-23 RX ORDER — SODIUM CHLORIDE 9 MG/ML
INJECTION, SOLUTION INTRAVENOUS PRN
Status: DISCONTINUED | OUTPATIENT
Start: 2022-04-23 | End: 2022-04-25 | Stop reason: HOSPADM

## 2022-04-23 RX ORDER — ACETAMINOPHEN 650 MG/1
650 SUPPOSITORY RECTAL EVERY 6 HOURS PRN
Status: DISCONTINUED | OUTPATIENT
Start: 2022-04-23 | End: 2022-04-25 | Stop reason: HOSPADM

## 2022-04-23 RX ORDER — ACETAMINOPHEN 325 MG/1
650 TABLET ORAL ONCE
Status: COMPLETED | OUTPATIENT
Start: 2022-04-23 | End: 2022-04-23

## 2022-04-23 RX ORDER — ALBUTEROL SULFATE 2.5 MG/3ML
2.5 SOLUTION RESPIRATORY (INHALATION) EVERY 6 HOURS PRN
Status: DISCONTINUED | OUTPATIENT
Start: 2022-04-23 | End: 2022-04-25 | Stop reason: HOSPADM

## 2022-04-23 RX ORDER — BENZONATATE 100 MG/1
100 CAPSULE ORAL 3 TIMES DAILY PRN
Status: DISCONTINUED | OUTPATIENT
Start: 2022-04-23 | End: 2022-04-25 | Stop reason: HOSPADM

## 2022-04-23 RX ORDER — SODIUM CHLORIDE 0.9 % (FLUSH) 0.9 %
5-40 SYRINGE (ML) INJECTION EVERY 12 HOURS SCHEDULED
Status: DISCONTINUED | OUTPATIENT
Start: 2022-04-23 | End: 2022-04-25 | Stop reason: HOSPADM

## 2022-04-23 RX ORDER — BUDESONIDE AND FORMOTEROL FUMARATE DIHYDRATE 80; 4.5 UG/1; UG/1
2 AEROSOL RESPIRATORY (INHALATION) 2 TIMES DAILY
Status: DISCONTINUED | OUTPATIENT
Start: 2022-04-23 | End: 2022-04-23 | Stop reason: CLARIF

## 2022-04-23 RX ORDER — BUDESONIDE 0.25 MG/2ML
0.25 INHALANT ORAL 2 TIMES DAILY
Status: DISCONTINUED | OUTPATIENT
Start: 2022-04-23 | End: 2022-04-25 | Stop reason: HOSPADM

## 2022-04-23 RX ORDER — ONDANSETRON 4 MG/1
4 TABLET, ORALLY DISINTEGRATING ORAL EVERY 8 HOURS PRN
Status: DISCONTINUED | OUTPATIENT
Start: 2022-04-23 | End: 2022-04-25 | Stop reason: HOSPADM

## 2022-04-23 RX ORDER — ARFORMOTEROL TARTRATE 15 UG/2ML
15 SOLUTION RESPIRATORY (INHALATION) 2 TIMES DAILY
Status: DISCONTINUED | OUTPATIENT
Start: 2022-04-23 | End: 2022-04-25 | Stop reason: HOSPADM

## 2022-04-23 RX ORDER — TAMSULOSIN HYDROCHLORIDE 0.4 MG/1
0.4 CAPSULE ORAL DAILY
Status: DISCONTINUED | OUTPATIENT
Start: 2022-04-23 | End: 2022-04-25 | Stop reason: HOSPADM

## 2022-04-23 RX ORDER — POLYETHYLENE GLYCOL 3350 17 G/17G
17 POWDER, FOR SOLUTION ORAL DAILY PRN
Status: DISCONTINUED | OUTPATIENT
Start: 2022-04-23 | End: 2022-04-25 | Stop reason: HOSPADM

## 2022-04-23 RX ORDER — ONDANSETRON 2 MG/ML
4 INJECTION INTRAMUSCULAR; INTRAVENOUS EVERY 6 HOURS PRN
Status: DISCONTINUED | OUTPATIENT
Start: 2022-04-23 | End: 2022-04-25 | Stop reason: HOSPADM

## 2022-04-23 RX ORDER — FLUTICASONE PROPIONATE 50 MCG
1 SPRAY, SUSPENSION (ML) NASAL DAILY
Status: DISCONTINUED | OUTPATIENT
Start: 2022-04-23 | End: 2022-04-25 | Stop reason: HOSPADM

## 2022-04-23 RX ORDER — ATORVASTATIN CALCIUM 10 MG/1
10 TABLET, FILM COATED ORAL NIGHTLY
Status: DISCONTINUED | OUTPATIENT
Start: 2022-04-23 | End: 2022-04-25 | Stop reason: HOSPADM

## 2022-04-23 RX ORDER — ENOXAPARIN SODIUM 100 MG/ML
40 INJECTION SUBCUTANEOUS DAILY
Status: DISCONTINUED | OUTPATIENT
Start: 2022-04-23 | End: 2022-04-25 | Stop reason: HOSPADM

## 2022-04-23 RX ORDER — ACETAMINOPHEN 325 MG/1
650 TABLET ORAL EVERY 6 HOURS PRN
Status: DISCONTINUED | OUTPATIENT
Start: 2022-04-23 | End: 2022-04-25 | Stop reason: HOSPADM

## 2022-04-23 RX ORDER — CEFTRIAXONE 2 G/1
INJECTION, POWDER, FOR SOLUTION INTRAMUSCULAR; INTRAVENOUS
Status: DISPENSED
Start: 2022-04-23 | End: 2022-04-24

## 2022-04-23 RX ORDER — ASPIRIN 81 MG/1
81 TABLET, CHEWABLE ORAL DAILY
Status: DISCONTINUED | OUTPATIENT
Start: 2022-04-23 | End: 2022-04-25 | Stop reason: HOSPADM

## 2022-04-23 RX ADMIN — TAMSULOSIN HYDROCHLORIDE 0.4 MG: 0.4 CAPSULE ORAL at 17:04

## 2022-04-23 RX ADMIN — BUDESONIDE 250 MCG: 0.25 SUSPENSION RESPIRATORY (INHALATION) at 19:12

## 2022-04-23 RX ADMIN — IOPAMIDOL 75 ML: 755 INJECTION, SOLUTION INTRAVENOUS at 13:33

## 2022-04-23 RX ADMIN — ENOXAPARIN SODIUM 40 MG: 100 INJECTION SUBCUTANEOUS at 17:04

## 2022-04-23 RX ADMIN — WATER 2000 MG: 1 INJECTION INTRAMUSCULAR; INTRAVENOUS; SUBCUTANEOUS at 13:59

## 2022-04-23 RX ADMIN — LEVOFLOXACIN 750 MG: 5 INJECTION, SOLUTION INTRAVENOUS at 17:12

## 2022-04-23 RX ADMIN — SODIUM CHLORIDE, PRESERVATIVE FREE 10 ML: 5 INJECTION INTRAVENOUS at 14:00

## 2022-04-23 RX ADMIN — ASPIRIN 81 MG: 81 TABLET, CHEWABLE ORAL at 17:04

## 2022-04-23 RX ADMIN — DOXYCYCLINE 100 MG: 100 INJECTION, POWDER, LYOPHILIZED, FOR SOLUTION INTRAVENOUS at 14:08

## 2022-04-23 RX ADMIN — ATORVASTATIN CALCIUM 10 MG: 10 TABLET, FILM COATED ORAL at 21:36

## 2022-04-23 RX ADMIN — SODIUM CHLORIDE, PRESERVATIVE FREE 10 ML: 5 INJECTION INTRAVENOUS at 21:37

## 2022-04-23 RX ADMIN — FLUTICASONE PROPIONATE 1 SPRAY: 50 SPRAY, METERED NASAL at 17:07

## 2022-04-23 RX ADMIN — ARFORMOTEROL TARTRATE 15 MCG: 15 SOLUTION RESPIRATORY (INHALATION) at 19:13

## 2022-04-23 RX ADMIN — ACETAMINOPHEN 650 MG: 325 TABLET ORAL at 17:04

## 2022-04-23 ASSESSMENT — ENCOUNTER SYMPTOMS
EYE PAIN: 0
SHORTNESS OF BREATH: 0
SINUS PRESSURE: 0
DIARRHEA: 0
VOMITING: 0
WHEEZING: 0
SORE THROAT: 0
EYE REDNESS: 0
EYE DISCHARGE: 0
ABDOMINAL PAIN: 0
BACK PAIN: 0
NAUSEA: 0
COUGH: 1

## 2022-04-23 ASSESSMENT — PAIN DESCRIPTION - PAIN TYPE: TYPE: ACUTE PAIN

## 2022-04-23 ASSESSMENT — PAIN SCALES - GENERAL
PAINLEVEL_OUTOF10: 10

## 2022-04-23 ASSESSMENT — PAIN DESCRIPTION - LOCATION
LOCATION: BACK;HEAD
LOCATION: BACK;HEAD

## 2022-04-23 ASSESSMENT — PAIN - FUNCTIONAL ASSESSMENT
PAIN_FUNCTIONAL_ASSESSMENT: ACTIVITIES ARE NOT PREVENTED
PAIN_FUNCTIONAL_ASSESSMENT: 0-10

## 2022-04-23 ASSESSMENT — PAIN DESCRIPTION - FREQUENCY: FREQUENCY: CONTINUOUS

## 2022-04-23 ASSESSMENT — LIFESTYLE VARIABLES: HOW OFTEN DO YOU HAVE A DRINK CONTAINING ALCOHOL: MONTHLY OR LESS

## 2022-04-23 ASSESSMENT — PAIN DESCRIPTION - DESCRIPTORS: DESCRIPTORS: SHARP;SHOOTING;SORE

## 2022-04-23 ASSESSMENT — PAIN DESCRIPTION - ORIENTATION: ORIENTATION: RIGHT;LEFT

## 2022-04-23 ASSESSMENT — PAIN DESCRIPTION - ONSET: ONSET: ON-GOING

## 2022-04-23 NOTE — ED PROVIDER NOTES
81-year-old male history of CAD hypertension hyperlipidemia presents to the emergency department with shortness of breath fever as high as 103 this morning at home back pain headache generalized fatigue. He states he symptoms started yesterday morning. He states no known sick contacts. He states no urinary symptoms leg swelling chest pain nausea vomiting diarrhea abdominal pain urinary symptoms or other complaints at this time. The history is provided by the patient. URI  Presenting symptoms: congestion, cough, fatigue and fever    Presenting symptoms: no ear pain and no sore throat    Severity:  Mild  Onset quality:  Gradual  Duration:  2 days  Timing:  Intermittent  Progression:  Waxing and waning  Chronicity:  New  Relieved by:  Nothing  Worsened by:  Nothing  Ineffective treatments:  None tried  Associated symptoms: arthralgias and myalgias    Associated symptoms: no headaches and no wheezing         Review of Systems   Constitutional: Positive for fatigue and fever. Negative for chills. HENT: Positive for congestion. Negative for ear pain, sinus pressure and sore throat. Eyes: Negative for pain, discharge and redness. Respiratory: Positive for cough. Negative for shortness of breath and wheezing. Cardiovascular: Negative for chest pain. Gastrointestinal: Negative for abdominal pain, diarrhea, nausea and vomiting. Genitourinary: Negative for dysuria and frequency. Musculoskeletal: Positive for arthralgias and myalgias. Negative for back pain. Skin: Negative for rash and wound. Neurological: Negative for weakness and headaches. Hematological: Negative for adenopathy. All other systems reviewed and are negative. Physical Exam  Constitutional:       Appearance: He is well-developed. HENT:      Head: Normocephalic and atraumatic. Cardiovascular:      Rate and Rhythm: Normal rate and regular rhythm.    Pulmonary:      Effort: Pulmonary effort is normal.      Breath sounds: Normal breath sounds. No decreased breath sounds, wheezing, rhonchi or rales. Abdominal:      General: Bowel sounds are normal.      Palpations: Abdomen is soft. Musculoskeletal:         General: Normal range of motion. Cervical back: Normal range of motion and neck supple. Right lower leg: No tenderness. No edema. Left lower leg: No tenderness. No edema. Skin:     General: Skin is warm. Capillary Refill: Capillary refill takes less than 2 seconds. Neurological:      General: No focal deficit present. Mental Status: He is alert and oriented to person, place, and time. Procedures   EKG: This EKG is signed and interpreted by the EP. Time: 12:06  Rate: 73  Rhythm: Sinus  Interpretation: NSR, incomplete RBBB  Comparison: stable as compared to patient's most recent EKG    MDM  Number of Diagnoses or Management Options  Diagnosis management comments: Patient seen and examined. Patient hypoxic with ambulation. Labs and imaging were ordered concern for pneumonia COVID or influenza. During course of work-up x-ray revealed concerns for pneumonia. CT scan of the chest showed evidence of bilateral infiltrates in the lower lobes of the lungs. Given hypoxia antibiotics were initiated after blood cultures were drawn patient was admitted to the hospitalist for further evaluation for hypoxic respiratory secondary to community-acquired pneumonia.            --------------------------------------------- PAST HISTORY ---------------------------------------------  Past Medical History:  has a past medical history of Aneurysm artery, pulmonary [address] [address] Arthritis, Asthma, Cancer ([facility] [address] Deviated septum, Hepatitis B, Hypercholesterolemia, Hypertension, Kidney stone, Restless leg syndrome, and Rheumatoid arthritis(714.0). Past Surgical History:  has a past surgical history that includes hernia repair (Left); cardiovascular stress test (1994);  Tonsillectomy; hernia repair; Cataract removal (Bilateral, 2002); Colonoscopy; Diagnostic Cardiac Cath Lab Procedure; Vasectomy; Adenoidectomy; cyst removal; skin biopsy (03/27/2018); Cardiac valuve replacement (06/16/2020); and Cardiac catheterization. Social History:  reports that he quit smoking about 46 years ago. He started smoking about 56 years ago. He has a 5.00 pack-year smoking history. He has never used smokeless tobacco. He reports that he does not drink alcohol and does not use drugs. Family History: family history is not on file. He was adopted. The patients home medications have been reviewed.     Allergies: Statins [statins]    -------------------------------------------------- RESULTS -------------------------------------------------    LABS:  Results for orders placed or performed during the hospital encounter of 04/23/22   COVID-19, Rapid    Specimen: Nasopharyngeal Swab   Result Value Ref Range    SARS-CoV-2, NAAT Not Detected Not Detected   RAPID INFLUENZA A/B ANTIGENS    Specimen: Nasopharyngeal   Result Value Ref Range    Influenza A by PCR Not Detected Not Detected    Influenza B by PCR Not Detected Not Detected   CBC with Auto Differential   Result Value Ref Range    WBC 12.5 (H) 4.5 - 11.5 E9/L    RBC 4.64 3.80 - 5.80 E12/L    Hemoglobin 14.9 12.5 - 16.5 g/dL    Hematocrit 45.0 37.0 - 54.0 %    MCV 97.0 80.0 - 99.9 fL    MCH 32.1 26.0 - 35.0 pg    MCHC 33.1 32.0 - 34.5 %    RDW 13.1 11.5 - 15.0 fL    Platelets 174 (L) 062 - 450 E9/L    MPV 10.6 7.0 - 12.0 fL    Neutrophils % 91.3 (H) 43.0 - 80.0 %    Lymphocytes % 3.5 (L) 20.0 - 42.0 %    Monocytes % 4.3 2.0 - 12.0 %    Eosinophils % 0.0 0.0 - 6.0 %    Basophils % 0.9 0.0 - 2.0 %    Neutrophils Absolute 11.38 (H) 1.80 - 7.30 E9/L    Lymphocytes Absolute 0.50 (L) 1.50 - 4.00 E9/L    Monocytes Absolute 0.50 0.10 - 0.95 E9/L    Eosinophils Absolute 0.00 (L) 0.05 - 0.50 E9/L    Basophils Absolute 0.11 0.00 - 0.20 E9/L    nRBC 0.0 /100 WBC    Polychromasia 1+     Poikilocytes 1+ Keyana Cells 1+     Ovalocytes 1+    Basic Metabolic Panel   Result Value Ref Range    Sodium 138 132 - 146 mmol/L    Potassium 3.9 3.5 - 5.0 mmol/L    Chloride 101 98 - 107 mmol/L    CO2 22 22 - 29 mmol/L    Anion Gap 15 7 - 16 mmol/L    Glucose 121 (H) 74 - 99 mg/dL    BUN 17 6 - 23 mg/dL    CREATININE 0.9 0.7 - 1.2 mg/dL    GFR Non-African American >60 >=60 mL/min/1.73    GFR African American >60     Calcium 9.6 8.6 - 10.2 mg/dL   Hepatic Function Panel   Result Value Ref Range    Total Protein 7.3 6.4 - 8.3 g/dL    Albumin 4.4 3.5 - 5.2 g/dL    Alkaline Phosphatase 59 40 - 129 U/L    ALT 18 0 - 40 U/L    AST 20 0 - 39 U/L    Total Bilirubin 3.9 (H) 0.0 - 1.2 mg/dL    Bilirubin, Direct 0.3 0.0 - 0.3 mg/dL    Bilirubin, Indirect 3.6 (H) 0.0 - 1.0 mg/dL   Troponin   Result Value Ref Range    Troponin, High Sensitivity 23 (H) 0 - 11 ng/L   Magnesium   Result Value Ref Range    Magnesium 1.8 1.6 - 2.6 mg/dL   EKG 12 Lead   Result Value Ref Range    Ventricular Rate 73 BPM    Atrial Rate 73 BPM    P-R Interval 162 ms    QRS Duration 102 ms    Q-T Interval 370 ms    QTc Calculation (Bazett) 407 ms    P Axis 19 degrees    R Axis -26 degrees    T Axis 83 degrees       RADIOLOGY:  XR CHEST PORTABLE    Result Date: 4/23/2022  EXAMINATION: ONE XRAY VIEW OF THE CHEST 4/23/2022 12:01 pm COMPARISON: 07/14/2020 HISTORY: ORDERING SYSTEM PROVIDED HISTORY: shotness of breath TECHNOLOGIST PROVIDED HISTORY: Reason for exam:->shotness of breath FINDINGS: Heart size is normal.  There is suspicion of infiltrate left retrocardiac region. Right lung is clear. There is aortic stent graft, sternotomy and valvular replacement. These appear unchanged. There are no significant effusions. Suspect left lower lobe infiltrate.      CTA PULMONARY W CONTRAST    Result Date: 4/23/2022  EXAMINATION: CTA OF THE CHEST 4/23/2022 1:33 pm TECHNIQUE: CTA of the chest was performed after the administration of intravenous contrast.  Multiplanar reformatted images are provided for review. MIP images are provided for review. Dose modulation, iterative reconstruction, and/or weight based adjustment of the mA/kV was utilized to reduce the radiation dose to as low as reasonably achievable. COMPARISON: Chest x-ray 1 hour prior HISTORY: ORDERING SYSTEM PROVIDED HISTORY: eval for PE, possible pneumonia TECHNOLOGIST PROVIDED HISTORY: Reason for exam:->eval for PE, possible pneumonia Decision Support Exception - unselect if not a suspected or confirmed emergency medical condition->Emergency Medical Condition (MA) FINDINGS: Pulmonary Arteries: Pulmonary arteries are adequately opacified for evaluation. No evidence of intraluminal filling defect to suggest pulmonary embolism. Main pulmonary artery is normal in caliber. Mediastinum: No evidence of mediastinal lymphadenopathy. The heart and pericardium demonstrate no acute abnormality. Postsurgical changes from aortic valve repair and stenting of the thoracic aorta and great vessel origins which appears patent Lungs/pleura: Confluent bilateral pulmonary opacifications of airspace disease bronchopneumonia minimal involvement right middle lobe with predominance in the bilateral lower lobes which are fairly well consolidated without pleural effusion Upper Abdomen: Limited images of the upper abdomen are unremarkable. Soft Tissues/Bones: No acute bone or soft tissue abnormality. No evidence of pulmonary embolism. Confluent bilateral pulmonary opacifications in the lower lungs and minimal right middle lobe involvement of airspace disease bronchopneumonia without pleural effusion       ------------------------- NURSING NOTES AND VITALS REVIEWED ---------------------------  Date / Time Roomed:  4/23/2022 11:11 AM  ED Bed Assignment:  12/12    The nursing notes within the ED encounter and vital signs as below have been reviewed.      Patient Vitals for the past 24 hrs:   BP Temp Temp src Pulse Resp SpO2 Height Weight 04/23/22 1215 -- -- -- -- -- 91 % -- --   04/23/22 1210 -- -- -- -- -- (!) 86 % -- --   04/23/22 1147 -- -- -- 79 -- -- -- --   04/23/22 1109 (!) 145/79 98.1 °F (36.7 °C) Oral 79 16 91 % 5' 5\" (1.651 m) 147 lb (66.7 kg)       Oxygen Saturation Interpretation: Improved after treatment    ------------------------------------------ PROGRESS NOTES ------------------------------------------    Counseling:  I have spoken with the patient and discussed todays results, in addition to providing specific details for the plan of care and counseling regarding the diagnosis and prognosis. Their questions are answered at this time and they are agreeable with the plan of admission.    --------------------------------- ADDITIONAL PROVIDER NOTES ---------------------------------  This patient's ED course included: a personal history and physicial examination, re-evaluation prior to disposition, multiple bedside re-evaluations, IV medications, cardiac monitoring and continuous pulse oximetry    This patient has remained hemodynamically stable during their ED course. Diagnosis:  1. Acute respiratory failure with hypoxia ([address]    2. Community acquired pneumonia of left lower lobe of lung        Disposition:  Patient's disposition: Admit to med/surg floor  Patient's condition is fair.          Traci Narayanan DO  04/23/22 1418

## 2022-04-23 NOTE — ED NOTES
Resting SpO2 91% on room air and heart rate 79, patient was ambulated and SpO2 desaturated to 86% and heart rate 99. Patient became short of breath and was placed on 2 L nasal cannula.      Kallie Lara RN  04/23/22 1639

## 2022-04-24 LAB
ANION GAP SERPL CALCULATED.3IONS-SCNC: 12 MMOL/L (ref 7–16)
BASOPHILS ABSOLUTE: 0.03 E9/L (ref 0–0.2)
BASOPHILS RELATIVE PERCENT: 0.3 % (ref 0–2)
BUN BLDV-MCNC: 18 MG/DL (ref 6–23)
CALCIUM SERPL-MCNC: 8.9 MG/DL (ref 8.6–10.2)
CHLORIDE BLD-SCNC: 103 MMOL/L (ref 98–107)
CO2: 23 MMOL/L (ref 22–29)
CREAT SERPL-MCNC: 1 MG/DL (ref 0.7–1.2)
EOSINOPHILS ABSOLUTE: 0.13 E9/L (ref 0.05–0.5)
EOSINOPHILS RELATIVE PERCENT: 1.2 % (ref 0–6)
GFR AFRICAN AMERICAN: >60
GFR NON-AFRICAN AMERICAN: >60 ML/MIN/1.73
GLUCOSE BLD-MCNC: 93 MG/DL (ref 74–99)
HCT VFR BLD CALC: 39.7 % (ref 37–54)
HEMOGLOBIN: 13.4 G/DL (ref 12.5–16.5)
IMMATURE GRANULOCYTES #: 0.05 E9/L
IMMATURE GRANULOCYTES %: 0.5 % (ref 0–5)
L. PNEUMOPHILA SEROGP 1 UR AG: NORMAL
LYMPHOCYTES ABSOLUTE: 0.96 E9/L (ref 1.5–4)
LYMPHOCYTES RELATIVE PERCENT: 9.1 % (ref 20–42)
MCH RBC QN AUTO: 32.5 PG (ref 26–35)
MCHC RBC AUTO-ENTMCNC: 33.8 % (ref 32–34.5)
MCV RBC AUTO: 96.4 FL (ref 80–99.9)
MONOCYTES ABSOLUTE: 1.1 E9/L (ref 0.1–0.95)
MONOCYTES RELATIVE PERCENT: 10.4 % (ref 2–12)
NEUTROPHILS ABSOLUTE: 8.31 E9/L (ref 1.8–7.3)
NEUTROPHILS RELATIVE PERCENT: 78.5 % (ref 43–80)
OVALOCYTES: ABNORMAL
PDW BLD-RTO: 13.2 FL (ref 11.5–15)
PLATELET # BLD: 116 E9/L (ref 130–450)
PMV BLD AUTO: 10.7 FL (ref 7–12)
POIKILOCYTES: ABNORMAL
POTASSIUM REFLEX MAGNESIUM: 3.7 MMOL/L (ref 3.5–5)
RBC # BLD: 4.12 E12/L (ref 3.8–5.8)
SODIUM BLD-SCNC: 138 MMOL/L (ref 132–146)
STREP PNEUMONIAE ANTIGEN, URINE: NORMAL
WBC # BLD: 10.6 E9/L (ref 4.5–11.5)

## 2022-04-24 PROCEDURE — 2580000003 HC RX 258: Performed by: PHYSICIAN ASSISTANT

## 2022-04-24 PROCEDURE — 87449 NOS EACH ORGANISM AG IA: CPT

## 2022-04-24 PROCEDURE — 1200000000 HC SEMI PRIVATE

## 2022-04-24 PROCEDURE — 2700000000 HC OXYGEN THERAPY PER DAY

## 2022-04-24 PROCEDURE — 85025 COMPLETE CBC W/AUTO DIFF WBC: CPT

## 2022-04-24 PROCEDURE — 94640 AIRWAY INHALATION TREATMENT: CPT

## 2022-04-24 PROCEDURE — 36415 COLL VENOUS BLD VENIPUNCTURE: CPT

## 2022-04-24 PROCEDURE — 6360000002 HC RX W HCPCS: Performed by: PHYSICIAN ASSISTANT

## 2022-04-24 PROCEDURE — 99232 SBSQ HOSP IP/OBS MODERATE 35: CPT | Performed by: FAMILY MEDICINE

## 2022-04-24 PROCEDURE — 80048 BASIC METABOLIC PNL TOTAL CA: CPT

## 2022-04-24 PROCEDURE — 6370000000 HC RX 637 (ALT 250 FOR IP): Performed by: PHYSICIAN ASSISTANT

## 2022-04-24 PROCEDURE — APPSS30 APP SPLIT SHARED TIME 16-30 MINUTES: Performed by: PHYSICIAN ASSISTANT

## 2022-04-24 RX ADMIN — LEVOFLOXACIN 750 MG: 5 INJECTION, SOLUTION INTRAVENOUS at 16:51

## 2022-04-24 RX ADMIN — BUDESONIDE 250 MCG: 0.25 SUSPENSION RESPIRATORY (INHALATION) at 08:24

## 2022-04-24 RX ADMIN — FLUTICASONE PROPIONATE 1 SPRAY: 50 SPRAY, METERED NASAL at 08:42

## 2022-04-24 RX ADMIN — ARFORMOTEROL TARTRATE 15 MCG: 15 SOLUTION RESPIRATORY (INHALATION) at 19:21

## 2022-04-24 RX ADMIN — BUDESONIDE 250 MCG: 0.25 SUSPENSION RESPIRATORY (INHALATION) at 19:21

## 2022-04-24 RX ADMIN — ACETAMINOPHEN 650 MG: 325 TABLET ORAL at 06:38

## 2022-04-24 RX ADMIN — TAMSULOSIN HYDROCHLORIDE 0.4 MG: 0.4 CAPSULE ORAL at 08:42

## 2022-04-24 RX ADMIN — SODIUM CHLORIDE, PRESERVATIVE FREE 10 ML: 5 INJECTION INTRAVENOUS at 20:08

## 2022-04-24 RX ADMIN — SODIUM CHLORIDE, PRESERVATIVE FREE 5 ML: 5 INJECTION INTRAVENOUS at 08:42

## 2022-04-24 RX ADMIN — ASPIRIN 81 MG: 81 TABLET, CHEWABLE ORAL at 08:42

## 2022-04-24 RX ADMIN — ENOXAPARIN SODIUM 40 MG: 100 INJECTION SUBCUTANEOUS at 16:52

## 2022-04-24 RX ADMIN — ARFORMOTEROL TARTRATE 15 MCG: 15 SOLUTION RESPIRATORY (INHALATION) at 08:24

## 2022-04-24 RX ADMIN — ATORVASTATIN CALCIUM 10 MG: 10 TABLET, FILM COATED ORAL at 20:08

## 2022-04-24 NOTE — PLAN OF CARE
Problem: Pain  Goal: Verbalizes/displays adequate comfort level or baseline comfort level  Outcome: Progressing     Problem: Safety - Adult  Goal: Free from fall injury  Outcome: Adequate for Discharge

## 2022-04-24 NOTE — PROGRESS NOTES
[facility] Progress Note    Admitting Date and Time: 4/23/2022 11:11 AM  Admit Dx: Acute respiratory failure ([address] [J96.00]  Acute respiratory failure with hypoxia ([address] [J96.01]  Community acquired pneumonia of left lower lobe of lung [J18.9]  Pneumonia [J18.9]    Subjective:  Patient is being followed for Acute respiratory failure ([address] [J96.00]  Acute respiratory failure with hypoxia ([address] [J96.01]  Community acquired pneumonia of left lower lobe of lung [J18.9]  Pneumonia [J18.9]     Patient lying in bed in no acute distress. He continues to report difficulty with taking deep breaths. ROS: denies fever, chills, cp, sob, n/v, HA unless stated above.       aspirin  81 mg Oral Daily    atorvastatin  10 mg Oral Nightly    fluticasone  1 spray Each Nostril Daily    tamsulosin  0.4 mg Oral Daily    sodium chloride flush  5-40 mL IntraVENous 2 times per day    enoxaparin  40 mg SubCUTAneous Daily    levofloxacin  750 mg IntraVENous Q24H    Arformoterol Tartrate  15 mcg Nebulization BID    And    budesonide  0.25 mg Nebulization BID     albuterol, 2.5 mg, Q6H PRN  sodium chloride flush, 5-40 mL, PRN  sodium chloride, , PRN  ondansetron, 4 mg, Q8H PRN   Or  ondansetron, 4 mg, Q6H PRN  polyethylene glycol, 17 g, Daily PRN  acetaminophen, 650 mg, Q6H PRN   Or  acetaminophen, 650 mg, Q6H PRN  benzonatate, 100 mg, TID PRN         Objective:    /70   Pulse 70   Temp 97.8 °F (36.6 °C) (Oral)   Resp 16   Ht 5' 5\" (1.651 m)   Wt 147 lb (66.7 kg)   SpO2 95%   BMI 24.46 kg/m²   General Appearance: alert and oriented to person, place and time and in no acute distress  Skin: warm and dry  Head: normocephalic and atraumatic  Eyes: pupils equal, round, and reactive to light, extraocular eye movements intact, conjunctivae normal  Neck: neck supple and non tender without mass   Pulmonary/Chest: clear to auscultation bilaterally- no wheezes, rales or rhonchi, normal air movement, no respiratory distress  Cardiovascular: normal rate, normal S1 and S2 and no carotid bruits  Abdomen: soft, non-tender, non-distended, normal bowel sounds, no masses or organomegaly  Extremities: no cyanosis, no clubbing and no edema  Neurologic: no cranial nerve deficit and speech normal        Recent Labs     04/23/22  1128 04/24/22  0400    138   K 3.9 3.7    103   CO2 22 23   BUN 17 18   CREATININE 0.9 1.0   GLUCOSE 121* 93   CALCIUM 9.6 8.9       Recent Labs     04/23/22  1128 04/24/22  0400   WBC 12.5* 10.6   RBC 4.64 4.12   HGB 14.9 13.4   HCT 45.0 39.7   MCV 97.0 96.4   MCH 32.1 32.5   MCHC 33.1 33.8   RDW 13.1 13.2   * 116*   MPV 10.6 10.7       Radiology: None    Assessment:    Principal Problem:    Acute respiratory failure (HCC)  Active Problems:    Pneumonia    Community acquired pneumonia of left lower lobe of lung  Resolved Problems:    * No resolved hospital problems. *      Plan:  1. Acute respiratory failure: Patient saturating at 86% when ambulating, placed on 2 L NC. Currently on 1.5 L NC. Wean as tolerated.      2. PNA: CXR showed possible left lower lobe infiltrate. CTA showing bilateral opacifications in the lower lungs and minimal right middle lobe involvement. Blood cultures pending. Pro rachel 0. 11. Respiratory panel negative. Urine legionella and strep negative. Recieved Rocephin x1 in the ED. Continue IV Levaquin for 7 days. IS.      3. CAD: S/p CABG in 2020. S/p left atrial appendage ligation 6/16/2020, aortic valve replacement and aortoplasty. Continue ASA, and Lipitor. Follows with Cardiology outpatient. 4. COPD: Continue Albuterol. Symbicort started as formulary for Symbicort. Follows with pulmonologist, Dr. Rexford Bernheim outpatient.     5. Chronic HBV: Follows with GI outpatient. Does not currently meet indication for treatment.      6. Atrial flutter: Not currently on any medications. Follows with Cardiology outpatient. 7. 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 Follows with Rheumatology outpatient.      9. Headache: Tylenol prn.     10. Hyperlipidemia: Continue Lipitor       NOTE: This report was transcribed using voice recognition software. Every effort was made to ensure accuracy; however, inadvertent computerized transcription errors may be present.   Electronically signed by Ozzy Tadeo PA-C on 4/24/2022 at 3:24 PM

## 2022-04-25 ENCOUNTER — HOSPITAL ENCOUNTER (OUTPATIENT)
Dept: CARDIAC REHAB | Age: 72
Discharge: HOME | End: 2022-04-25

## 2022-04-25 VITALS
SYSTOLIC BLOOD PRESSURE: 117 MMHG | HEIGHT: 65 IN | DIASTOLIC BLOOD PRESSURE: 65 MMHG | HEART RATE: 89 BPM | OXYGEN SATURATION: 95 % | BODY MASS INDEX: 24.49 KG/M2 | TEMPERATURE: 98.4 F | RESPIRATION RATE: 18 BRPM | WEIGHT: 147 LBS

## 2022-04-25 LAB
ANION GAP SERPL CALCULATED.3IONS-SCNC: 12 MMOL/L (ref 7–16)
BASOPHILS ABSOLUTE: 0.02 E9/L (ref 0–0.2)
BASOPHILS RELATIVE PERCENT: 0.2 % (ref 0–2)
BUN BLDV-MCNC: 23 MG/DL (ref 6–23)
CALCIUM SERPL-MCNC: 8.8 MG/DL (ref 8.6–10.2)
CHLORIDE BLD-SCNC: 103 MMOL/L (ref 98–107)
CO2: 21 MMOL/L (ref 22–29)
CREAT SERPL-MCNC: 1 MG/DL (ref 0.7–1.2)
EOSINOPHILS ABSOLUTE: 0.17 E9/L (ref 0.05–0.5)
EOSINOPHILS RELATIVE PERCENT: 1.9 % (ref 0–6)
GFR AFRICAN AMERICAN: >60
GFR NON-AFRICAN AMERICAN: >60 ML/MIN/1.73
GLUCOSE BLD-MCNC: 101 MG/DL (ref 74–99)
HCT VFR BLD CALC: 38.9 % (ref 37–54)
HEMOGLOBIN: 13 G/DL (ref 12.5–16.5)
IMMATURE GRANULOCYTES #: 0.03 E9/L
IMMATURE GRANULOCYTES %: 0.3 % (ref 0–5)
LYMPHOCYTES ABSOLUTE: 0.76 E9/L (ref 1.5–4)
LYMPHOCYTES RELATIVE PERCENT: 8.7 % (ref 20–42)
MCH RBC QN AUTO: 32.2 PG (ref 26–35)
MCHC RBC AUTO-ENTMCNC: 33.4 % (ref 32–34.5)
MCV RBC AUTO: 96.3 FL (ref 80–99.9)
MONOCYTES ABSOLUTE: 0.76 E9/L (ref 0.1–0.95)
MONOCYTES RELATIVE PERCENT: 8.7 % (ref 2–12)
NEUTROPHILS ABSOLUTE: 7.02 E9/L (ref 1.8–7.3)
NEUTROPHILS RELATIVE PERCENT: 80.2 % (ref 43–80)
PDW BLD-RTO: 12.9 FL (ref 11.5–15)
PLATELET # BLD: 133 E9/L (ref 130–450)
PMV BLD AUTO: 10.9 FL (ref 7–12)
POTASSIUM REFLEX MAGNESIUM: 3.7 MMOL/L (ref 3.5–5)
RBC # BLD: 4.04 E12/L (ref 3.8–5.8)
SODIUM BLD-SCNC: 136 MMOL/L (ref 132–146)
WBC # BLD: 8.8 E9/L (ref 4.5–11.5)

## 2022-04-25 PROCEDURE — 85025 COMPLETE CBC W/AUTO DIFF WBC: CPT

## 2022-04-25 PROCEDURE — 2580000003 HC RX 258: Performed by: PHYSICIAN ASSISTANT

## 2022-04-25 PROCEDURE — APPSS30 APP SPLIT SHARED TIME 16-30 MINUTES: Performed by: PHYSICIAN ASSISTANT

## 2022-04-25 PROCEDURE — 80048 BASIC METABOLIC PNL TOTAL CA: CPT

## 2022-04-25 PROCEDURE — 99239 HOSP IP/OBS DSCHRG MGMT >30: CPT | Performed by: INTERNAL MEDICINE

## 2022-04-25 PROCEDURE — 6360000002 HC RX W HCPCS: Performed by: PHYSICIAN ASSISTANT

## 2022-04-25 PROCEDURE — 36415 COLL VENOUS BLD VENIPUNCTURE: CPT

## 2022-04-25 PROCEDURE — 2700000000 HC OXYGEN THERAPY PER DAY

## 2022-04-25 PROCEDURE — 94640 AIRWAY INHALATION TREATMENT: CPT

## 2022-04-25 PROCEDURE — 6370000000 HC RX 637 (ALT 250 FOR IP): Performed by: PHYSICIAN ASSISTANT

## 2022-04-25 RX ORDER — BENZONATATE 100 MG/1
100 CAPSULE ORAL 3 TIMES DAILY PRN
Qty: 9 CAPSULE | Refills: 0 | Status: SHIPPED | OUTPATIENT
Start: 2022-04-25 | End: 2022-04-28

## 2022-04-25 RX ORDER — LEVOFLOXACIN 750 MG/1
750 TABLET ORAL DAILY
Qty: 3 TABLET | Refills: 0 | Status: SHIPPED | OUTPATIENT
Start: 2022-04-25 | End: 2022-04-28

## 2022-04-25 RX ADMIN — BUDESONIDE 250 MCG: 0.25 SUSPENSION RESPIRATORY (INHALATION) at 07:49

## 2022-04-25 RX ADMIN — TAMSULOSIN HYDROCHLORIDE 0.4 MG: 0.4 CAPSULE ORAL at 08:40

## 2022-04-25 RX ADMIN — POLYETHYLENE GLYCOL 3350 17 G: 17 POWDER, FOR SOLUTION ORAL at 08:39

## 2022-04-25 RX ADMIN — ACETAMINOPHEN 650 MG: 325 TABLET ORAL at 08:45

## 2022-04-25 RX ADMIN — ACETAMINOPHEN 650 MG: 325 TABLET ORAL at 01:38

## 2022-04-25 RX ADMIN — ASPIRIN 81 MG: 81 TABLET, CHEWABLE ORAL at 08:40

## 2022-04-25 RX ADMIN — ARFORMOTEROL TARTRATE 15 MCG: 15 SOLUTION RESPIRATORY (INHALATION) at 07:49

## 2022-04-25 RX ADMIN — SODIUM CHLORIDE, PRESERVATIVE FREE 5 ML: 5 INJECTION INTRAVENOUS at 08:40

## 2022-04-25 RX ADMIN — FLUTICASONE PROPIONATE 1 SPRAY: 50 SPRAY, METERED NASAL at 08:40

## 2022-04-25 ASSESSMENT — PAIN SCALES - GENERAL
PAINLEVEL_OUTOF10: 3
PAINLEVEL_OUTOF10: 10

## 2022-04-25 ASSESSMENT — PAIN DESCRIPTION - DESCRIPTORS
DESCRIPTORS: ACHING;PRESSURE
DESCRIPTORS: ACHING

## 2022-04-25 ASSESSMENT — PAIN DESCRIPTION - LOCATION
LOCATION: HEAD
LOCATION: ABDOMEN;HEAD

## 2022-04-25 NOTE — CARE COORDINATION
Introduced my self and provided explanation of CM role to patient. Patient is awake, alert, and aware of current diagnosis and treatment plan including discharge planing. Wife is at bedside. Patient reports he is independent with adl's and uses no adaptive equipment. Patient verbalizes no concerns and identifies no areas to focus on nor barriers to discharge. He is on room air and voices he has nebulizer pump in place at home. Discharge order is noted. Victor M Azevedo.  Kristin, MSN, RN  [facility] Case Management  [phone]

## 2022-04-25 NOTE — PATIENT CARE CONFERENCE
P Quality Flow/Interdisciplinary Rounds Progress Note        Quality Flow Rounds held on April 25, 2022    Disciplines Attending:  Bedside Nurse, ,  and Nursing Unit Leadership    Haylee Howell was admitted on 4/23/2022 11:11 AM    Anticipated Discharge Date:       Disposition:    Dino Score:  Dino Scale Score: 22    Readmission Risk              Risk of Unplanned Readmission:  13           Discussed patient goal for the day, patient clinical progression, and barriers to discharge.   The following Goal(s) of the Day/Commitment(s) have been identified:  Discharge - Obtain Order      Radha Delgado RN  April 25, 2022

## 2022-04-25 NOTE — PLAN OF CARE
Problem: Pain  Goal: Verbalizes/displays adequate comfort level or baseline comfort level  4/24/2022 2159 by Brooklyn Morton RN  Outcome: Progressing  4/24/2022 1046 by Patrice Mckeon RN  Outcome: Progressing     Problem: Safety - Adult  Goal: Free from fall injury  4/24/2022 2159 by Brooklyn Morton RN  Outcome: Progressing  4/24/2022 1046 by Patrice Mckeon RN  Outcome: Progressing

## 2022-04-25 NOTE — PROGRESS NOTES
CLINICAL PHARMACY NOTE: MEDS TO BEDS    Total # of Prescriptions Filled: 2   The following medications were delivered to the patient:  · Benzonatate 100  · Levofloxacin 750    Additional Documentation:

## 2022-04-25 NOTE — DISCHARGE SUMMARY
[facility] Physician Discharge Summary       Maida Conley [address] [place] [address]  [phone]    Schedule an appointment as soon as possible for a visit  Post hosptial follow up       Activity level: As tolerated     Dispo: Home      Condition on discharge: Stable     Patient ID:  Allie Campoverde  [id number]  23 y.o.  1950    Admit date: 4/23/2022    Discharge date and time:  4/25/2022  1:22 PM    Admission Diagnoses: Principal Problem:    Acute respiratory failure ([address]  Active Problems:    Pneumonia    Community acquired pneumonia of left lower lobe of lung  Resolved Problems:    * No resolved hospital problems. *      Discharge Diagnoses: Principal Problem:    Acute respiratory failure ([address]  Active Problems:    Pneumonia    Community acquired pneumonia of left lower lobe of lung  Resolved Problems:    * No resolved hospital problems. *      Consults:  None    Procedures: None    Hospital Course:   Patient Allie Campoverde is a 73 y.o. presented with Acute respiratory failure ([address] [J96.00]  Acute respiratory failure with hypoxia ([address] [J96.01]  Community acquired pneumonia of left lower lobe of lung [J18.9]  Pneumonia [J259]    73year old male with a past medical history of hyperlipidemia, CAD, hypertension, COPD, chronic HBV, atrial flutter, thoracic AAA, and aortitis RA presented to the ED for SOB. He was admitted and treated as below. 1.  Acute respiratory failure: Patient saturating at 86% when ambulating, placed on 2 L NC. Currently on 1.5 L NC. Wean as tolerated.      2. PNA: CXR showed possible left lower lobe infiltrate. CTA showing bilateral opacifications in the lower lungs and minimal right middle lobe involvement. Blood cultures pending. Pro rachel 0. 11. Respiratory panel negative. Urine legionella and strep negative. Recieved Rocephin x1 in the ED. Continue IV Levaquin, he will be transitioned to PO Levaquin for a total of 5 days. IS.      3.  CAD: S/p CABG in 2020. S/p left atrial appendage ligation 6/16/2020, aortic valve replacement and aortoplasty. Continue ASA, and Lipitor. Follows with Cardiology outpatient.      4. COPD: Continue Albuterol. Symbicort started as formulary for Symbicort. Follows with pulmonologist, Dr. Liliam Olmos outpatient.     5. Chronic HBV: Anjali Clifford with GI outpatient. Does not currently meet indication for treatment.      6. Atrial flutter: Not currently on any medications. Follows with Cardiology outpatient. 7. 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 Follows with Rheumatology outpatient.      9. Headache: Tylenol prn.     10. Hyperlipidemia: Continue Lipitor      He is hemodynamically stable and ready for discharge. He is to follow up with PCP and Pulmonologist. Recommend outpatient PFTs. Discharge Exam:  General Appearance: alert and oriented to person, place and time and in no acute distress  Skin: warm and dry  Head: normocephalic and atraumatic  Eyes: pupils equal, round, and reactive to light, extraocular eye movements intact, conjunctivae normal  Neck: neck supple and non tender without mass   Pulmonary/Chest: clear to auscultation bilaterally- no wheezes, rales or rhonchi, normal air movement, no respiratory distress  Cardiovascular: normal rate, normal S1 and S2 and no carotid bruits  Abdomen: soft, non-tender, non-distended, normal bowel sounds, no masses or organomegaly  Extremities: no cyanosis, no clubbing and no edema  Neurologic: no cranial nerve deficit and speech normal    I/O last 3 completed shifts: In: 480 [P.O.:480]  Out: -   No intake/output data recorded.       LABS:  Recent Labs     04/23/22  1128 04/24/22  0400 04/25/22  0246    138 136   K 3.9 3.7 3.7    103 103   CO2 22 23 21*   BUN 17 18 23   CREATININE 0.9 1.0 1.0   GLUCOSE 121* 93 101*   CALCIUM 9.6 8.9 8.8       Recent Labs     04/23/22  1128 04/24/22  0400 04/25/22  0246   WBC 12.5* 10.6 8.8 RBC 4.64 4.12 4.04   HGB 14.9 13.4 13.0   HCT 45.0 39.7 38.9   MCV 97.0 96.4 96.3   MCH 32.1 32.5 32.2   MCHC 33.1 33.8 33.4   RDW 13.1 13.2 12.9   * 116* 133   MPV 10.6 10.7 10.9       No results for input(s): POCGLU in the last 72 hours. Imaging:  No results found. Patient Instructions:      Medication List      START taking these medications    benzonatate 100 MG capsule  Commonly known as: TESSALON  Take 1 capsule by mouth 3 times daily as needed for Cough     levoFLOXacin 750 MG tablet  Commonly known as: Levaquin  Take 1 tablet by mouth daily for 3 days        CONTINUE taking these medications    acetaminophen 500 MG tablet  Commonly known as: TYLENOL     aspirin 81 MG chewable tablet     atorvastatin 10 MG tablet  Commonly known as: LIPITOR     CALCIUM 1200 PO     calcium carbonate 600 MG Tabs tablet     Dulera 200-5 MCG/ACT inhaler  Generic drug: mometasone-formoterol     fluticasone 50 MCG/ACT nasal spray  Commonly known as: FLONASE     PRESERVISION AREDS 2+MULTI VIT PO     * PROAIR HFA IN     * albuterol (2.5 MG/3ML) 0.083% nebulizer solution  Commonly known as: PROVENTIL  Take 3 mLs by nebulization every 6 hours as needed for Wheezing     tamsulosin 0.4 MG capsule  Commonly known as: FLOMAX     vitamin B-12 1000 MCG tablet  Commonly known as: CYANOCOBALAMIN         * This list has 2 medication(s) that are the same as other medications prescribed for you. Read the directions carefully, and ask your doctor or other care provider to review them with you.                Where to Get Your Medications      These medications were sent to [address] [place] [address] [facility] [phone]  [address] [place], [place] [address]toni Higgins, [address] [place] [address]    Phone: [phone]   · benzonatate 100 MG capsule  · levoFLOXacin 750 MG tablet           Note that more than 30 minutes was spent in preparing discharge papers, discussing discharge with patient, medication review, etc.    Signed:  Electronically signed by Kristie Ferguson PA-C on 4/25/2022 at 1:22 PM

## 2022-04-25 NOTE — PROGRESS NOTES
Physician Progress Note      PATIENT:               Eric Miguel  [facility] #:                  [id number]  :                       1950  ADMIT DATE:       2022 11:11 AM  DISCH DATE:  RESPONDING  PROVIDER #:        London Dangelo          QUERY TEXT:    Patient admitted with pneumonia. Noted documentation of acute respiratory   failure in H&P on . In order to support the diagnosis of acute respiratory   failure, please include additional clinical indicators in your documentation. Or please document if the diagnosis of acute respiratory failure has been   ruled out after further study. The medical record reflects the following:  Risk Factors: pneumonia  Clinical Indicators: RR 16-18, Pulse ox 86% on room air, per ER notes \". .. Effort: Pulmonary effort is normal.  Breath sounds: Normal breath sounds. No decreased breath sounds, wheezing,   rhonchi or rales. Peewee Vides Ra \", and per H&P \". Peewee Vides Ra Pulmonary/Chest: clear to auscultation   bilaterally- no wheezes, rales or rhonchi, normal air movement, no respiratory   distress. Peewee Vides Ra \"  Treatment: oxygen therapy    Thank you,  Cristina KULKARNI, RN, CDIS  Clinical Documentation Improvement  [email or web address]  [phone]    Acute Respiratory Failure Clinical Indicators per [employer] Training Guide and   Quick Reference Guide:  pO2 < 60 mmHg or SpO2 (pulse oximetry) < 91% breathing room air  pCO2 > 50 and pH < 7.35  P/F ratio (pO2 / FIO2) < 300  pO2 decrease or pCO2 increase by 10 mmHg from baseline (if known)  Supplemental oxygen of 40% or more  Presence of respiratory distress, tachypnea, dyspnea, shortness of breath,   wheezing  Unable to speak in complete sentences  Use of accessory muscles to breathe  Extreme anxiety and feeling of impending doom  Tripod position  Confusion/altered mental status/obtunded  Options provided:  -- Acute Respiratory Failure as evidenced by, Please document evidence.   -- Acute Respiratory Failure ruled out after study  -- Other - I will add my own diagnosis  -- Disagree - Not applicable / Not valid  -- Disagree - Clinically unable to determine / Unknown  -- Refer to Clinical Documentation Reviewer    PROVIDER RESPONSE TEXT:    This patient is in acute respiratory failure as evidenced by bilateral lower   lobe infiltrates on CT scan -- pneumonia    Query created by: Burgess Mayes on 4/25/2022 10:02 AM      Electronically signed by:  Carlos Campos 4/25/2022 2:07 PM

## 2022-04-26 ENCOUNTER — CARE COORDINATION (OUTPATIENT)
Dept: CASE MANAGEMENT | Age: 72
End: 2022-04-26

## 2022-04-26 ENCOUNTER — TELEPHONE (OUTPATIENT)
Dept: PRIMARY CARE CLINIC | Age: 72
End: 2022-04-26

## 2022-04-26 DIAGNOSIS — J96.00 ACUTE RESPIRATORY FAILURE, UNSPECIFIED WHETHER WITH HYPOXIA OR HYPERCAPNIA (HCC): Primary | ICD-10-CM

## 2022-04-26 PROCEDURE — 1111F DSCHRG MED/CURRENT MED MERGE: CPT | Performed by: FAMILY MEDICINE

## 2022-04-26 NOTE — CARE COORDINATION
[facility] Transitions Initial Follow Up Call    Call within 2 business days of discharge: Yes    Patient: Preston Louise Patient : 1950   MRN: [id number]  Reason for Admission: Acute respiratory failure/PNA  Discharge Date: 22 RARS: Readmission Risk Score: 10 ( )      Last Discharge [facility]       Complaint Diagnosis Description Type Department Provider    22 Shortness of Breath; Fever; Back Pain; Headache Acute respiratory failure with hypoxia (HCC) . .. ED to Hosp-Admission (Discharged) (ADMITTED) Keli Murphy MD; Enriqueta Quarles. .. Transitions of Care Initial Call    Was this an external facility discharge? No Discharge Facility: [facility]    Challenges to be reviewed by the provider   Additional needs identified to be addressed with provider: No  none             Method of communication with provider : none      Advance Care Planning:   Does patient have an Advance Directive: reviewed and current. Was this a readmission? No  Patient stated reason for admission: shortness of breath/weakness  Patients top risk factors for readmission: lack of knowledge about disease  medical condition-COPD/Emphysema, Acute respiratory failure, PNA and Asthma  multiple health system providers  polypharmacy    Care Transition Nurse (CTN) contacted the patient by telephone to perform post hospital discharge assessment. Verified name and  with patient as identifiers. Provided introduction to self, and explanation of the CTN role. CTN reviewed discharge instructions, medical action plan and red flags with patient who verbalized understanding. Patient given an opportunity to ask questions and does not have any further questions or concerns at this time. Were discharge instructions available to patient? Yes. Reviewed appropriate site of care based on symptoms and resources available to patient including: PCP  Specialist  When to call 911  Condition related references.  The patient agrees to contact the PCP office for questions related to their healthcare. Medication reconciliation was performed with patient, who verbalizes understanding of administration of home medications. Advised obtaining a 90-day supply of all daily and as-needed medications. Covid Risk Education     Educated patient about risk for severe COVID-19 due to risk factors according to CDC guidelines. CTN reviewed discharge instructions, medical action plan and red flag symptoms with the patient who verbalized understanding. Discussed COVID vaccination status: Yes and CTN confirmed with patient he is fully vaccinted. . Education provided on COVID-19 vaccination as appropriate. Discussed exposure protocols and quarantine with CDC Guidelines. Patient was given an opportunity to verbalize any questions and concerns and agrees to contact CTN or health care provider for questions related to their healthcare. Reviewed and educated patient on any new and changed medications related to discharge diagnosis. Was patient discharged with a pulse oximeter? No Discussed and confirmed pulse oximeter discharge instructions and when to notify provider or seek emergency care. CTN provided contact information. Plan for follow-up call in 5-7 days based on severity of symptoms and risk factors. Plan for next call: symptom management-Has cough and weakness improved?  follow up appointment-Did patient get scheduled for HFU appt with Dr. Yung Saleh (PCP)?         Non-face-to-face services provided:  Scheduled appointment with PCP-CTN confirmed with patient he will call Dr. Caesar Pride office (PCP) today to schedule HFU appt  Scheduled appointment with Specialist-CTN confirmed with patient he follows with pulmonology at [facility] and plans on making a follow up appt  Obtained and reviewed discharge summary and/or continuity of care documents  Education of patient/family/caregiver/guardian to support self-management-Discussed COPD/PNA zone tools an knowing when to seek medical attention. Assessment and support for treatment adherence and medication management-Advised to take Levaquin as directed until completely finished. Establishment or re-establishment of referrals-Confirmed with patient he attends cardio rehab at [facility] three times weekly since having open heart surgery in 2020. Care Transitions 24 Hour Call    Schedule Follow Up Appointment with PCP: Declined  Do you have a copy of your discharge instructions?: Yes  Do you have all of your prescriptions and are they filled?: Yes  Have you been contacted by a [facility] Pharmacist?: No  Have you scheduled your follow up appointment?: No  Do you have support at home?: Partner/Spouse/SO  Do you feel like you have everything you need to keep you well at home?: Yes  Are you an active caregiver in your home?: No  Care Transitions Interventions       Spoke with patient today 4/26/22 for initial TCM/hospital discharge follow up. Patient state breathing is improving and complains of an ongoing productive cough. States producing a \"greenish-brown\" colored phlegm with cough. States he tends to have cough associated with nebulizer treatments. Denies being on any home oxygen therapy. Denies any shortness of breath, chest pain, chest discomfort, abdominal pain, nausea, vomiting, diarrhea, chills or fever. Patient states he is using incentive spirometer as directed. Denies any recent use of Albuterol rescue inhaler but admits he gest relief from nebulizer treatments. Noted CTA of chest obtained on admission showed the following:     No evidence of pulmonary embolism.  Confluent bilateral pulmonary   opacifications in the lower lungs and minimal right middle lobe involvement   of airspace disease bronchopneumonia without pleural effusion     Patient states he continues with weakness but ambulates independently. States he tolerating breakfast this morning and having his cup of coffee.      Provided a complete review of home meds with patient who confirms he obtained new meds ordered on discharge. Advised to take Levaquin as directed until completely finished. 1111F code entered. Confirmed with patient he attends cardio reahb at [facility] three times weekly since having [facility] heart in [facility]. States he follows with pulmonology at [facility] and intends on making a f/u appt. States he will call Dr. Sonia Chavira office (PCP) today to schedule HFU appt. States he is independent in driving and denies any transportation needs. Discussed COPD/PNA zone tools and knowing when to seek medical attention. Denies any other complaints or concerns at this time. CTN will continue to follow for Care Transition.     Follow Up  Future Appointments   Date Time Provider Shereen Dewey   8/18/2022  8:15 AM DO ALFREDO [facility]       Bertina Aschoff, APRN

## 2022-04-26 NOTE — TELEPHONE ENCOUNTER
----- Message from Dorothy Ware sent at 4/26/2022  9:23 AM EDT -----  Subject: Hospital Follow Up    QUESTIONS  What hospital was the Patient Discharged from? [address] [place] [address]   Date of Discharge? 2022-04-25  Discharge Location? Home  Reason for hospitalization as patient stated? Acute respiratory failure   ([address] Pneumonia Community acquired pneumonia of left lower lobe of lung   What question does the patient have, if applicable? if you can not reach   patient on home phone ([phone]) then please call cell phone   ([phone])   ---------------------------------------------------------------------------  --------------  [address]  What is the best way for the office to contact you? OK to leave message on   voicemail  Preferred Call Back Phone Number? [phone]  ---------------------------------------------------------------------------  --------------  SCRIPT ANSWERS  Relationship to Patient? Self  (Patient requests to see provider urgently. )? No  (Has the patient been discharged from the hospital within 2 business days   AND does not have a Telephone Encounter  Follow Up From [address] [place] [address]   documented in [address]?)? Yes  Do you have any questions for your primary care provider that need to be   answered prior to your appointment? (Use RN Triage if question pertains to   anything on the red flag list)? No  (Patient needs follow up visit after hospital discharge) Book first   available appointment within 7 days OF DISCHARGE, if no appt, proceed to   book the next available time slot within 14 days OF DISCHARGE AND Send   Message to Provider. [facility] Follow Up appointment cannot be booked   beyond 14 Days and should result in a Message to Provider. ?  Yes

## 2022-04-27 ENCOUNTER — CARE COORDINATION (OUTPATIENT)
Dept: CASE MANAGEMENT | Age: 72
End: 2022-04-27

## 2022-04-27 NOTE — CARE COORDINATION
Request from  José Luis Campos 95, APRN  Please follow up with patient to see if hospital f/u was made. Called patient, no answer. VM is not set up.     Oscar Cummings, [address] [facility] Coordination Transition

## 2022-04-28 LAB
BLOOD CULTURE, ROUTINE: NORMAL
CULTURE, BLOOD 2: NORMAL

## 2022-05-03 ENCOUNTER — OFFICE VISIT (OUTPATIENT)
Dept: PRIMARY CARE CLINIC | Age: 72
End: 2022-05-03
Payer: MEDICARE

## 2022-05-03 ENCOUNTER — CARE COORDINATION (OUTPATIENT)
Dept: CASE MANAGEMENT | Age: 72
End: 2022-05-03

## 2022-05-03 VITALS
HEART RATE: 97 BPM | BODY MASS INDEX: 23.99 KG/M2 | HEIGHT: 65 IN | OXYGEN SATURATION: 95 % | WEIGHT: 144 LBS | TEMPERATURE: 97.3 F

## 2022-05-03 DIAGNOSIS — J18.9 COMMUNITY ACQUIRED PNEUMONIA OF LEFT LOWER LOBE OF LUNG: ICD-10-CM

## 2022-05-03 DIAGNOSIS — Z09 HOSPITAL DISCHARGE FOLLOW-UP: Primary | ICD-10-CM

## 2022-05-03 DIAGNOSIS — I48.0 PAROXYSMAL ATRIAL FIBRILLATION (HCC): Chronic | ICD-10-CM

## 2022-05-03 PROCEDURE — 1111F DSCHRG MED/CURRENT MED MERGE: CPT | Performed by: FAMILY MEDICINE

## 2022-05-03 PROCEDURE — 99495 TRANSJ CARE MGMT MOD F2F 14D: CPT | Performed by: FAMILY MEDICINE

## 2022-05-03 NOTE — CARE COORDINATION
[facility] Transitions Follow Up Call    5/3/2022    Patient: Ines Nuno  Patient : 1950   MRN: [id number]  Reason for Admission: Acute respiratory failure/CAP LLL  Discharge Date: 22 RARS: Readmission Risk Score: 10 ( )    Care Transitions Follow Up Call    Needs to be reviewed by the provider   Additional needs identified to be addressed with provider: No  none             Method of communication with provider : none      Care Transition Nurse (CTN) contacted the patient by telephone to follow up after admission on 22. Verified name and  with patient as identifiers. Addressed changes since last contact: none  Discussed follow-up appointments. If no appointment was previously scheduled, appointment scheduling offered: Yes. Is follow up appointment scheduled within 7 days of discharge? Yes and CTN confirmed with patient he completed HFU appt with Dr. Queen Botello (PCP) today on 5/3/22. Does patient have an Advance Directive: reviewed and current. CTN reviewed discharge instructions, medical action plan and red flags with patient and discussed any barriers to care and/or understanding of plan of care after discharge. Discussed appropriate site of care based on symptoms and resources available to patient including: PCP  Specialist  Urgent care clinics  When to call 911  Condition related references. The patient agrees to contact the PCP office for questions related to their healthcare. Patients top risk factors for readmission: lack of knowledge about disease  medical condition-COPD/Emphysema, AFib, Acute respiratory failure, PNA, and Asthma  polypharmacy    Non[facility] follow up appointment(s): CTN confirmed with patient he is scheduled to follow up with Dr. Romulo Bloom (pulmonology) at [facility] on 22. Plan for follow-up call in 7-10 days based on severity of symptoms and risk factors.     Care Transitions Subsequent and Final Call    Subsequent and Final Calls  Do you have any ongoing symptoms?: Yes  Onset of Patient-reported symptoms: Other  Patient-reported symptoms: Other, Cough  Have your medications changed?: No  Do you have any questions related to your medications?: No  Do you currently have any active services?: No  Do you have any needs or concerns that I can assist you with?: No  Identified Barriers: Lack of Education  Care Transitions Interventions  No Identified Needs  Other Interventions:         Spoke with patient today 5/3/22 for TCM/hospital discharge follow up sub call. Patient complains of ongoing cough which has turned from productive to dry and improving. States he is \"unable to take a full breath\" but has no chest pain or chest discomfort, nausea, vomiting, diarrhea, chills or fever. States he continues with nebulizer treatments which are helping and utilizing incentive spirometer. Patient checked pulse ox while on phone with this CTN and admits oxygen saturation is 97%on R/A at rest and HR 77. Confirmed patient completed PCP follow up today and noted saturation while in office was 95%. Patient admits he was able to walk 2 miles today and cut the front grass today. States he has a follow up appt with Dr. Neftaly Junior (pulmonology) at [facility] on Monday 5/9/22. Confirmed with patient he is finished with Levaquin that was ordered on hosp discharge. CTN reiterated COPD /PNA zone tools and knowing when to seek medical attention. Denies any other complaints or concerns at this time. CTN will continue to follow for Care Transition. Follow Up  Future Appointments   Date Time Provider Shereen Dewey   8/18/2022  8:15 AM DO ALFREDO Turner [facility]     CTN provided contact information for future needs.     VAL Miramontes

## 2022-05-03 NOTE — PROGRESS NOTES
Post-Discharge Transitional Care  Follow Up      Marcie Jordan   YOB: 1950    Date of Office Visit:  5/3/2022  Date of Hospital Admission: 4/23/22  Date of Hospital Discharge: 4/25/22  Risk of hospital readmission (high >=14%. Medium >=10%) :Readmission Risk Score: 10 ( )      Care management risk score Rising risk (score 2-5) and Complex Care (Scores >=6): 7     Non face to face  following discharge, date last encounter closed (first attempt may have been earlier): 4/26/2022  9:42 AM    Call initiated 2 business days of discharge: Yes    ASSESSMENT/PLAN:   Hospital discharge follow-up  -     LA DISCHARGE MEDS RECONCILED W/ CURRENT OUTPATIENT MED LIST  Community acquired pneumonia of left lower lobe of lung  Paroxysmal atrial fibrillation [facility])      Medical Decision Making: high complexity  No follow-ups on file. On this date 5/3/2022 I have spent 45 minutes reviewing previous notes, test results and face to face with the patient discussing the diagnosis and importance of compliance with the treatment plan as well as documenting on the day of the visit. Subjective:   HPI:  Follow up of Hospital problems/diagnosis(es): pneumonia  copd    Inpatient course: Discharge summary reviewed- see chart.     Interval history/Current status: dc with penumonia and  resp distress          [facility] rxrum in [facility] in few days    Ct disuceed with pt  Copy fpor t  To take to [facility]        Patient Active Problem List   Diagnosis    Lumbar spinal stenosis    Osteopenia    Essential hypertension    Kidney cyst, acquired    Basal cell carcinoma of skin of face    Coronary atherosclerosis    MR (mitral regurgitation)    GERD (gastroesophageal reflux disease)    Hyperlipemia    Macular degeneration    Asthma    AR (aortic regurgitation)    Alopecia    Heart valve disease    Pulmonary emphysema ([address]    Thoracic aortic aneurysm without rupture (HCC)    Chronic hepatitis B (HCC)    Paroxysmal atrial fibrillation (HCC)    Rheumatoid arthritis involving multiple sites with positive rheumatoid factor (HCC)    Kidney stone    Gallbladder sludge    Vitamin B12 deficiency (dietary) anemia    Vitamin D deficiency    Thrombocytopenia, unspecified    Acute respiratory failure [facility] [address]    Pneumonia    Community acquired pneumonia of left lower lobe of lung       Medications listed as ordered at the time of discharge from hospital     Medication List          Accurate as of May 3, 2022  8:30 AM. If you have any questions, ask your nurse or doctor. CONTINUE taking these medications    acetaminophen 500 MG tablet  Commonly known as: TYLENOL     aspirin 81 MG chewable tablet     atorvastatin 10 MG tablet  Commonly known as: LIPITOR     CALCIUM 1200 PO     calcium carbonate 600 MG Tabs tablet     Dulera 200-5 MCG/ACT inhaler  Generic drug: mometasone-formoterol     fluticasone 50 MCG/ACT nasal spray  Commonly known as: FLONASE     PRESERVISION AREDS 2+MULTI VIT PO     * PROAIR HFA IN     * albuterol (2.5 MG/3ML) 0.083% nebulizer solution  Commonly known as: PROVENTIL  Take 3 mLs by nebulization every 6 hours as needed for Wheezing     tamsulosin 0.4 MG capsule  Commonly known as: FLOMAX     vitamin B-12 1000 MCG tablet  Commonly known as: CYANOCOBALAMIN         * This list has 2 medication(s) that are the same as other medications prescribed for you. Read the directions carefully, and ask your doctor or other care provider to review them with you. Medications marked \"taking\" at this time  No outpatient medications have been marked as taking for the 5/3/22 encounter (Office Visit) with Gurdeep Aguilar DO.        Medications patient taking as of now reconciled against medications ordered at time of hospital discharge: Yes    A comprehensive review of systems was negative except for what was noted in the HPI.     Objective:    Pulse 97   Temp 97.3 °F (36.3 °C) (Infrared)   Ht 5' 5\" (1.651 m) Wt 144 lb (65.3 kg)   SpO2 95%   BMI 23.96 kg/m²   General Appearance: alert and oriented to person, place and time, well developed and well- nourished, in no acute distress  Skin: warm and dry, no rash or erythema  Head: normocephalic and atraumatic  Eyes: pupils equal, round, and reactive to light, extraocular eye movements intact, conjunctivae normal  ENT: tympanic membrane, external ear and ear canal normal bilaterally, nose without deformity, nasal mucosa and turbinates normal without polyps  Neck: supple and non-tender without mass, no thyromegaly or thyroid nodules, no cervical lymphadenopathy  Pulmonary/Chest: clear to auscultation bilaterally- no wheezes, rales or rhonchi, normal air movement, no respiratory distress  Cardiovascular: normal rate, regular rhythm, normal S1 and S2, no murmurs, rubs, clicks, or gallops, distal pulses intact, no carotid bruits  Abdomen: soft, non-tender, non-distended, normal bowel sounds, no masses or organomegaly  Extremities: no cyanosis, clubbing or edema  Musculoskeletal: normal range of motion, no joint swelling, deformity or tenderness  Neurologic: reflexes normal and symmetric, no cranial nerve deficit, gait, coordination and speech normal    hosp reviwd with pt      Fu with [facility] pulm  cll if recurs   He is doen with ab  perfectoa taisha domingo      I educated the patient about all medications. Make sure they were correct and educated  on the risk associated with missing meds or taking them incorrectly. A list of medications is being sent home with patient today. I informed patient about the risk associated with noncompliance of medication and taking medications incorrectly. Appropriate follow-up with myself and all specialist.  Encourage family members to take active role in assisting with medications and medical care.   If any confusion should develop to notify my office immediately to avoid risk of worsening medical condition    A great deal of time spent reviewing medications, diet, exercise, social issues. Also reviewing the chart before entering the room with patient and finishing charting after leaving patient's room. More than half of that time was spent face to face with the patient in counseling and coordinating care. An electronic signature was used to authenticate this note.   --Stanislaw Machado, DO

## 2022-05-09 NOTE — PROGRESS NOTES
Physician Progress Note      PATIENT:               Heide Hein  CSN #:                  [id number]  :                       1950  ADMIT DATE:       2022 11:11 AM  DISCH DATE:        2022 4:03 PM  RESPONDING  PROVIDER #:        KJ Avery TEXT:    Patient admitted with pneumonia. Documentation reflects sepsis in H&P note   dated . If possible, please document in the progress notes and discharge   summary if sepsis was: The medical record reflects the following:  Risk Factors: pneumonia  Clinical Indicators: WBC 12.5, Procal 0.11, T 100, and per H&P \". ..fourth   episode of pneumonia. With sepsis and hypoxemia with acute respiratory   failure, hypoxemia with ambulation. Nenita Ty \"  Treatment: IV Levaquin, IV Rocephin, IV Doxycycline    Thank you,  Casimiro KULKARNI, RN, CDIS  Clinical Documentation Improvement  [email or web address]  [phone]  Options provided:  -- Sepsis confirmed after study  -- Sepsis ruled out after study  -- Other - I will add my own diagnosis  -- Disagree - Not applicable / Not valid  -- Disagree - Clinically unable to determine / Unknown  -- Refer to Clinical Documentation Reviewer    PROVIDER RESPONSE TEXT:    Sepsis confirmed after study.     Query created by: Nathaly Gutierres on 2022 8:46 AM      Electronically signed by:  Jessica Carreon 2022 8:46 AM

## 2022-05-10 ENCOUNTER — CARE COORDINATION (OUTPATIENT)
Dept: CASE MANAGEMENT | Age: 72
End: 2022-05-10

## 2022-05-10 ENCOUNTER — TELEPHONE (OUTPATIENT)
Dept: PRIMARY CARE CLINIC | Age: 72
End: 2022-05-10

## 2022-05-10 NOTE — CARE COORDINATION
[facility] Transitions Follow Up Call    5/10/2022    Patient: Chris Gomez  Patient : 1950   MRN: [id number]  Reason for Admission: Acute respiratory failure/CAP  Discharge Date: 22 RARS: Readmission Risk Score: 10 ( )    Attempted to contact patient today 5/10/22 for TCM/hospital discharge follow up sub call. Left message on home/mobile numbers requesting a return call back to CTN and provided contact information.       Zack Velazco APRN

## 2022-05-10 NOTE — TELEPHONE ENCOUNTER
----- Message from [address] [place] [address] sent at 5/10/2022  8:43 AM EDT -----  Subject: Referral Request    QUESTIONS   Reason for referral request? pt called in to say he saw his pulmonology in   [facility] [employer] and that doctor would like to know if Dr. Leesa Plummer could order an   EKG for the pt. Please call pt back to advise. thanks   Has the physician seen you for this condition before? Yes  Select a date? 2022-05-09  Select the Provider the patient wants to be referred to, if known (PCP or   Specialist)? Outside Physician - pulmonology   Preferred Specialist (if applicable)? Do you already have an appointment scheduled? No  Additional Information for Provider?   ---------------------------------------------------------------------------  --------------  CALL BACK INFO  What is the best way for the office to contact you? OK to leave message on   voicemail  Preferred Call Back Phone Number? [phone]  ---------------------------------------------------------------------------  --------------  SCRIPT ANSWERS  Relationship to Patient?  Self

## 2022-05-10 NOTE — TELEPHONE ENCOUNTER
? pt called in to say he saw his pulmonology in   [facility] [place] and that doctor would like to know if Dr. Fermin Becerra could order an   EKG for the pt. Please call pt back to advise.  thanks

## 2022-05-10 NOTE — TELEPHONE ENCOUNTER
Let Patient know I would rather him schedule an appointment here and we will do it in the office that day.

## 2022-05-11 ENCOUNTER — OFFICE VISIT (OUTPATIENT)
Dept: PRIMARY CARE CLINIC | Age: 72
End: 2022-05-11
Payer: MEDICARE

## 2022-05-11 DIAGNOSIS — R00.1 BRADYCARDIA: ICD-10-CM

## 2022-05-11 DIAGNOSIS — I10 ESSENTIAL HYPERTENSION: ICD-10-CM

## 2022-05-11 DIAGNOSIS — J18.9 COMMUNITY ACQUIRED PNEUMONIA OF LEFT LOWER LOBE OF LUNG: ICD-10-CM

## 2022-05-11 DIAGNOSIS — Z00.00 MEDICARE ANNUAL WELLNESS VISIT, SUBSEQUENT: Primary | ICD-10-CM

## 2022-05-11 PROCEDURE — 4040F PNEUMOC VAC/ADMIN/RCVD: CPT | Performed by: FAMILY MEDICINE

## 2022-05-11 PROCEDURE — 1123F ACP DISCUSS/DSCN MKR DOCD: CPT | Performed by: FAMILY MEDICINE

## 2022-05-11 PROCEDURE — G0439 PPPS, SUBSEQ VISIT: HCPCS | Performed by: FAMILY MEDICINE

## 2022-05-11 PROCEDURE — 93000 ELECTROCARDIOGRAM COMPLETE: CPT | Performed by: FAMILY MEDICINE

## 2022-05-11 PROCEDURE — 3017F COLORECTAL CA SCREEN DOC REV: CPT | Performed by: FAMILY MEDICINE

## 2022-05-11 ASSESSMENT — PATIENT HEALTH QUESTIONNAIRE - PHQ9
SUM OF ALL RESPONSES TO PHQ QUESTIONS 1-9: 0
1. LITTLE INTEREST OR PLEASURE IN DOING THINGS: 0
SUM OF ALL RESPONSES TO PHQ QUESTIONS 1-9: 0
SUM OF ALL RESPONSES TO PHQ9 QUESTIONS 1 & 2: 0
SUM OF ALL RESPONSES TO PHQ QUESTIONS 1-9: 0
2. FEELING DOWN, DEPRESSED OR HOPELESS: 0
SUM OF ALL RESPONSES TO PHQ QUESTIONS 1-9: 0

## 2022-05-11 ASSESSMENT — LIFESTYLE VARIABLES: HOW OFTEN DO YOU HAVE A DRINK CONTAINING ALCOHOL: NEVER

## 2022-05-11 NOTE — PROGRESS NOTES
Medicare Annual Wellness Visit    Lam Santizo is here for Medicare AWV    Assessment & Plan   Medicare annual wellness visit, subsequent  Essential hypertension  -     EKG 12 lead; Future  Bradycardia  Community acquired pneumonia of left lower lobe of lung      Recommendations for Preventive Services Due: see orders and patient instructions/AVS.  Recommended screening schedule for the next 5-10 years is provided to the patient in written form: see Patient Instructions/AVS.     Return for Reg Appt. Subjective   The following acute and/or chronic problems were also addressed today:  Pulmonary doctor and request an EKG he call here for an order I advised him to see me for it    When he saw [facility] [facility] pulmonary to do a chest x-ray he feels he has some congestion in the left base still with a pneumonia was added. Has not used a nebulizer 4 times a day. We will hold off on pulmonary rehab. Plan on seeing him again with another chest x-ray in 4 weeks. Breathing better he has no chest pain. Patient's complete Health Risk Assessment and screening values have been reviewed and are found in Flowsheets. The following problems were reviewed today and where indicated follow up appointments were made and/or referrals ordered.     Positive Risk Factor Screenings with Interventions:              Health Habits/Nutrition:     Physical Activity: Inactive    Days of Exercise per Week: 0 days    Minutes of Exercise per Session: 0 min     Have you lost any weight without trying in the past 3 months?: No  Body mass index: 23.79  Have you seen the dentist within the past year?: Yes    Health Habits/Nutrition Interventions:  · Inadequate physical activity:  patient agrees to exercise for at least 150 minutes/week             Objective   Vitals:    05/11/22 1613   BP: 124/68   Pulse: 72   Temp: 97.8 °F (36.6 °C)   TempSrc: Infrared   SpO2: 95%   Weight: 143 lb (64.9 kg)   Height: 5' 5\" (1.651 m)      Body mass index is 23.8 kg/m². General Appearance: alert and oriented to person, place and time, well developed and well- nourished, in no acute distress  Skin: warm and dry, no rash or erythema  Head: normocephalic and atraumatic  Eyes: pupils equal, round, and reactive to light, extraocular eye movements intact, conjunctivae normal  ENT: tympanic membrane, external ear and ear canal normal bilaterally, nose without deformity, nasal mucosa and turbinates normal without polyps  Neck: supple and non-tender without mass, no thyromegaly or thyroid nodules, no cervical lymphadenopathy  Pulmonary/Chest: clear to auscultation bilaterally- no wheezes, rales or rhonchi, normal air movement, no respiratory distress  Cardiovascular: normal rate, regular rhythm, normal S1 and S2, no murmurs, rubs, clicks, or gallops, distal pulses intact, no carotid bruits  Abdomen: soft, non-tender, non-distended, normal bowel sounds, no masses or organomegaly  Extremities: no cyanosis, clubbing or edema  Musculoskeletal: normal range of motion, no joint swelling, deformity or tenderness  Neurologic: reflexes normal and symmetric, no cranial nerve deficit, gait, coordination and speech normal       Allergies   Allergen Reactions    Statins [Statins]      Caused muscle aches and weakness. Prior to Visit Medications    Medication Sig Taking?  Authorizing Provider   vitamin B-12 (CYANOCOBALAMIN) 1000 MCG tablet Take 1,000 mcg by mouth daily  Historical Provider, MD   albuterol (PROVENTIL) (2.5 MG/3ML) 0.083% nebulizer solution Take 3 mLs by nebulization every 6 hours as needed for Wheezing  Gómez Aguilar, DO   Calcium Carbonate-Vit D-Min (CALCIUM 1200 PO) Take by mouth daily   Historical Provider, MD   Albuterol Sulfate (PROAIR HFA IN) Inhale 2 puffs into the lungs as needed   Historical Provider, MD   tamsulosin (FLOMAX) 0.4 MG capsule Take 0.4 mg by mouth nightly   Historical Provider, MD   calcium carbonate 600 MG TABS tablet Take 1 tablet by mouth daily  Historical Provider, MD   mometasone-formoterol (DULERA) 200-5 MCG/ACT inhaler Inhale 2 puffs into the lungs every 12 hours  Historical Provider, MD   fluticasone (FLONASE) 50 MCG/ACT nasal spray 1 spray by Each Nostril route daily  Historical Provider, MD   Multiple Vitamins-Minerals (PRESERVISION AREDS 2+MULTI VIT PO) Take 1 capsule by mouth daily  Historical Provider, MD   acetaminophen (TYLENOL) 500 MG tablet Take 500 mg by mouth every 6 hours as needed for Pain  Historical Provider, MD   aspirin 81 MG chewable tablet Take 81 mg by mouth daily  Historical Provider, MD   atorvastatin (LIPITOR) 10 MG tablet Take 10 mg by mouth nightly  Historical Provider, MD Huang (Including outside providers/suppliers regularly involved in providing care):   Patient Care Team:  Stanislaw Machado DO as PCP - General (Family Medicine)  Stanislaw Machado DO as PCP - [facility] Empaneled Provider  VAL oTpete as Care Transitions Nurse    Reviewed and updated this visit:  Tobacco  Allergies  Med Hx  Surg Hx  Soc Hx  Fam Hx            Chest x-ray does show bradycardia with 2 PACs. [facility] and [facility]    Encourage nebulizer. If Rommie Nii symptoms well notify. Lightheadedness go to ER. No chest pain shortness of breath go to ER      I educated the patient about all medications. Make sure they were correct and educated  on the risk associated with missing meds or taking them incorrectly. A list of medications is being sent home with patient today. Check blood pressure at home twice a day. Low-salt low caffeine diet. Call if systolic blood pressure is above 386 and diastolic blood pressures above 85. Only use a upper arm digital cuff. Aggressive low-fat diet. Avoid red meats, greasy fried foods, dairy products. Avoid processed foods.   Take cholesterol medications without food.      I informed patient about the risk associated with noncompliance of medication and taking medications incorrectly. Appropriate follow-up with myself and all specialist.  Encourage family members to take active role in assisting with medications and medical care. If any confusion should develop to notify my office immediately to avoid risk of worsening medical condition      A great deal of time spent reviewing medications, diet, exercise, social issues. Also reviewing the chart before entering the room with patient and finishing charting after leaving patient's room. More than half of that time was spent face to face with the patient in counseling and coordinating care.

## 2022-05-11 NOTE — PATIENT INSTRUCTIONS
Personalized Preventive Plan for Frank Arevalo - 5/11/2022  Medicare offers a range of preventive health benefits. Some of the tests and screenings are paid in full while other may be subject to a deductible, co-insurance, and/or copay. Some of these benefits include a comprehensive review of your medical history including lifestyle, illnesses that may run in your family, and various assessments and screenings as appropriate. After reviewing your medical record and screening and assessments performed today your provider may have ordered immunizations, labs, imaging, and/or referrals for you. A list of these orders (if applicable) as well as your Preventive Care list are included within your After Visit Summary for your review. Other Preventive Recommendations:    · A preventive eye exam performed by an eye specialist is recommended every 1-2 years to screen for glaucoma; cataracts, macular degeneration, and other eye disorders. · A preventive dental visit is recommended every 6 months. · Try to get at least 150 minutes of exercise per week or 10,000 steps per day on a pedometer . · Order or download the FREE \"Exercise & Physical Activity: Your Everyday Guide\" from [facility] [employer]. Call 6-451.411.1262 or search [employer] [facility] [employer] online. · You need 8476-2939 mg of calcium and 2531-7598 IU of vitamin D per day. It is possible to meet your calcium requirement with diet alone, but a vitamin D supplement is usually necessary to meet this goal.  · When exposed to the sun, use a sunscreen that protects against both UVA and UVB radiation with an SPF of 30 or greater. Reapply every 2 to 3 hours or after sweating, drying off with a towel, or swimming. · Always wear a seat belt when traveling in a car. Always wear a helmet when riding a bicycle or motorcycle.

## 2022-05-12 ENCOUNTER — TELEPHONE (OUTPATIENT)
Dept: PRIMARY CARE CLINIC | Age: 72
End: 2022-05-12

## 2022-05-12 VITALS
SYSTOLIC BLOOD PRESSURE: 124 MMHG | HEIGHT: 65 IN | DIASTOLIC BLOOD PRESSURE: 68 MMHG | TEMPERATURE: 97.8 F | HEART RATE: 72 BPM | OXYGEN SATURATION: 95 % | WEIGHT: 143 LBS | BODY MASS INDEX: 23.82 KG/M2

## 2022-05-12 NOTE — TELEPHONE ENCOUNTER
Fax the EKG to Dr. Frank Mccallum at [facility] at [phone], [phone]    Also fax a copy to Dr. Carin Rios  cardiologist to [facility].   I do  Not have his fax number

## 2022-05-17 ENCOUNTER — CARE COORDINATION (OUTPATIENT)
Dept: CASE MANAGEMENT | Age: 72
End: 2022-05-17

## 2022-05-17 NOTE — CARE COORDINATION
[facility] Transitions Follow Up Call    2022    Patient: Juan Seay  Patient : 1950   MRN: [id number]  Reason for Admission: Acute respiratory failure/CAP LLL  Discharge Date: 22 RARS: Readmission Risk Score: 10 ( )         Spoke with: Jerome 680Varinder Transitions Subsequent and Final Call    Subsequent and Final Calls  Do you have any ongoing symptoms?: No  Have your medications changed?: No  Do you have any questions related to your medications?: No  Do you currently have any active services?: No  Do you have any needs or concerns that I can assist you with?: No  Identified Barriers: Lack of Motivation  Care Transitions Interventions  No Identified Needs  Other Interventions:         Spoke with patient today 22 for TCM/hospital discharge follow up sub call. Patient states he continues doing well and offers no complaints. States he was seen by pulmonology at [facility] and had a repeat CXR and advised pulse is running low. Patient states he had an EKG when he seen PCP saying \"it was abnormal\". Patient denies any complaints of weakness, shortness of breath, chest pain or chest discomfort, nausea, vomiting, chills or fever. Patient checked pulse ox while on phone with this CTN and advises saturation is 94% on R/A and pulse is 81. Patient states he follows with Dr. Willis Drake (cardiology) locally and with Alessio Campos (cardiology) at [facility]. Patient denies any new med changes except for taking Vit D and Vit B12. CTN reiterated COPD/PNA zone tools and knowing when to seek medical attention. Patient denies any needs or concerns at this time. CTN will continue to follow for Care Transition.      Follow Up  Future Appointments   Date Time Provider Shereen Dewey   2022  8:15 AM DO ALFREDO Pizarro [facility]       VAL Menendez

## 2022-05-26 ENCOUNTER — CARE COORDINATION (OUTPATIENT)
Dept: CASE MANAGEMENT | Age: 72
End: 2022-05-26

## 2022-05-26 NOTE — CARE COORDINATION
[facility] Transitions Follow Up Call    2022    Patient: Aixa Robertson  Patient : 1950   MRN: [id number]  Reason for Admission: Acute respiratory failure/CAP LLL  Discharge Date: 22 RARS: Readmission Risk Score: 10 ( )         Spoke with: Yanniebony 412Varinder Transitions Subsequent and Final Call    Subsequent and Final Calls  Do you have any ongoing symptoms?: No  Have your medications changed?: No  Do you have any questions related to your medications?: No  Do you currently have any active services?: No  Do you have any needs or concerns that I can assist you with?: No  Identified Barriers: None  Care Transitions Interventions  No Identified Needs  Other Interventions:         Spoke with patient today 22 for final TCM/hospital discharge follow up. Patient states he continues doing well and offers no complaints. Denies any shortness of breath, cough/congestion, chest pain, chest discomfort, abdominal pain, nausea, vomiting, diarrhea, chills or fever. Patient states he had a repeat CXR done today at [address] per Dr. Ernestina Mercedes (pulmonology) at [facility] and waiting for results. Patient denies any recent use of rescue inhaler and continues using incentive spirometer. Patient states oxygen saturation while on phone with this CTN is 94% and HR is 78. Reiterated PNA zone tool and knowing when to seek medical attention. Patient in agreement to final call. CTN signing off.    Follow Up  Future Appointments   Date Time Provider Shereen Dewey   2022  8:15 AM Hema Vegas, DO [facility]       Ira Pollack, APRN

## 2022-06-05 ENCOUNTER — APPOINTMENT (OUTPATIENT)
Dept: GENERAL RADIOLOGY | Age: 72
End: 2022-06-05
Payer: MEDICARE

## 2022-06-05 ENCOUNTER — APPOINTMENT (OUTPATIENT)
Dept: CT IMAGING | Age: 72
End: 2022-06-05
Payer: MEDICARE

## 2022-06-05 ENCOUNTER — HOSPITAL ENCOUNTER (EMERGENCY)
Age: 72
Discharge: HOME | End: 2022-06-05
Attending: EMERGENCY MEDICINE
Payer: MEDICARE

## 2022-06-05 VITALS
DIASTOLIC BLOOD PRESSURE: 68 MMHG | TEMPERATURE: 98.5 F | BODY MASS INDEX: 23.16 KG/M2 | HEART RATE: 72 BPM | WEIGHT: 139 LBS | OXYGEN SATURATION: 97 % | RESPIRATION RATE: 20 BRPM | HEIGHT: 65 IN | SYSTOLIC BLOOD PRESSURE: 112 MMHG

## 2022-06-05 DIAGNOSIS — R07.9 LEFT-SIDED CHEST PAIN: ICD-10-CM

## 2022-06-05 DIAGNOSIS — J18.9 PNEUMONIA OF LEFT UPPER LOBE DUE TO INFECTIOUS ORGANISM: Primary | ICD-10-CM

## 2022-06-05 LAB
ALBUMIN SERPL-MCNC: 4.1 G/DL (ref 3.5–5.2)
ALP BLD-CCNC: 61 U/L (ref 40–129)
ALT SERPL-CCNC: 17 U/L (ref 0–40)
ANION GAP SERPL CALCULATED.3IONS-SCNC: 12 MMOL/L (ref 7–16)
AST SERPL-CCNC: 21 U/L (ref 0–39)
BASOPHILS ABSOLUTE: 0.06 E9/L (ref 0–0.2)
BASOPHILS RELATIVE PERCENT: 0.3 % (ref 0–2)
BILIRUB SERPL-MCNC: 1.6 MG/DL (ref 0–1.2)
BUN BLDV-MCNC: 27 MG/DL (ref 6–23)
CALCIUM SERPL-MCNC: 9 MG/DL (ref 8.6–10.2)
CHLORIDE BLD-SCNC: 102 MMOL/L (ref 98–107)
CO2: 22 MMOL/L (ref 22–29)
CREAT SERPL-MCNC: 1 MG/DL (ref 0.7–1.2)
D DIMER: 700 NG/ML DDU
EOSINOPHILS ABSOLUTE: 0.23 E9/L (ref 0.05–0.5)
EOSINOPHILS RELATIVE PERCENT: 1.2 % (ref 0–6)
GFR AFRICAN AMERICAN: >60
GFR NON-AFRICAN AMERICAN: >60 ML/MIN/1.73
GLUCOSE BLD-MCNC: 150 MG/DL (ref 74–99)
HCT VFR BLD CALC: 39.6 % (ref 37–54)
HEMOGLOBIN: 13.1 G/DL (ref 12.5–16.5)
IMMATURE GRANULOCYTES #: 0.1 E9/L
IMMATURE GRANULOCYTES %: 0.5 % (ref 0–5)
INFLUENZA A: NOT DETECTED
INFLUENZA B: NOT DETECTED
LACTIC ACID, SEPSIS: 1.2 MMOL/L (ref 0.5–1.9)
LYMPHOCYTES ABSOLUTE: 1.27 E9/L (ref 1.5–4)
LYMPHOCYTES RELATIVE PERCENT: 6.9 % (ref 20–42)
MCH RBC QN AUTO: 31.9 PG (ref 26–35)
MCHC RBC AUTO-ENTMCNC: 33.1 % (ref 32–34.5)
MCV RBC AUTO: 96.4 FL (ref 80–99.9)
MONOCYTES ABSOLUTE: 1.58 E9/L (ref 0.1–0.95)
MONOCYTES RELATIVE PERCENT: 8.5 % (ref 2–12)
NEUTROPHILS ABSOLUTE: 15.3 E9/L (ref 1.8–7.3)
NEUTROPHILS RELATIVE PERCENT: 82.6 % (ref 43–80)
OVALOCYTES: ABNORMAL
PDW BLD-RTO: 13.5 FL (ref 11.5–15)
PLATELET # BLD: 155 E9/L (ref 130–450)
PMV BLD AUTO: 10 FL (ref 7–12)
POIKILOCYTES: ABNORMAL
POTASSIUM SERPL-SCNC: 3.8 MMOL/L (ref 3.5–5)
PRO-BNP: 571 PG/ML (ref 0–125)
RBC # BLD: 4.11 E12/L (ref 3.8–5.8)
SARS-COV-2 RNA, RT PCR: NOT DETECTED
SODIUM BLD-SCNC: 136 MMOL/L (ref 132–146)
TOTAL PROTEIN: 6.9 G/DL (ref 6.4–8.3)
TROPONIN, HIGH SENSITIVITY: 19 NG/L (ref 0–11)
TROPONIN, HIGH SENSITIVITY: 19 NG/L (ref 0–11)
WBC # BLD: 18.5 E9/L (ref 4.5–11.5)

## 2022-06-05 PROCEDURE — 71045 X-RAY EXAM CHEST 1 VIEW: CPT

## 2022-06-05 PROCEDURE — 99285 EMERGENCY DEPT VISIT HI MDM: CPT

## 2022-06-05 PROCEDURE — 80053 COMPREHEN METABOLIC PANEL: CPT

## 2022-06-05 PROCEDURE — 83880 ASSAY OF NATRIURETIC PEPTIDE: CPT

## 2022-06-05 PROCEDURE — 6370000000 HC RX 637 (ALT 250 FOR IP): Performed by: NURSE PRACTITIONER

## 2022-06-05 PROCEDURE — 85378 FIBRIN DEGRADE SEMIQUANT: CPT

## 2022-06-05 PROCEDURE — 87636 SARSCOV2 & INF A&B AMP PRB: CPT

## 2022-06-05 PROCEDURE — 85025 COMPLETE CBC W/AUTO DIFF WBC: CPT

## 2022-06-05 PROCEDURE — 2580000003 HC RX 258: Performed by: EMERGENCY MEDICINE

## 2022-06-05 PROCEDURE — 84484 ASSAY OF TROPONIN QUANT: CPT

## 2022-06-05 PROCEDURE — 6360000002 HC RX W HCPCS: Performed by: EMERGENCY MEDICINE

## 2022-06-05 PROCEDURE — 96374 THER/PROPH/DIAG INJ IV PUSH: CPT

## 2022-06-05 PROCEDURE — 71275 CT ANGIOGRAPHY CHEST: CPT

## 2022-06-05 PROCEDURE — 83605 ASSAY OF LACTIC ACID: CPT

## 2022-06-05 PROCEDURE — 6370000000 HC RX 637 (ALT 250 FOR IP): Performed by: EMERGENCY MEDICINE

## 2022-06-05 PROCEDURE — 87040 BLOOD CULTURE FOR BACTERIA: CPT

## 2022-06-05 PROCEDURE — 6360000004 HC RX CONTRAST MEDICATION: Performed by: RADIOLOGY

## 2022-06-05 PROCEDURE — 93005 ELECTROCARDIOGRAM TRACING: CPT | Performed by: NURSE PRACTITIONER

## 2022-06-05 RX ORDER — SODIUM CHLORIDE 9 MG/ML
INJECTION, SOLUTION INTRAVENOUS CONTINUOUS
Status: DISCONTINUED | OUTPATIENT
Start: 2022-06-05 | End: 2022-06-05 | Stop reason: HOSPADM

## 2022-06-05 RX ORDER — KETOROLAC TROMETHAMINE 30 MG/ML
15 INJECTION, SOLUTION INTRAMUSCULAR; INTRAVENOUS ONCE
Status: COMPLETED | OUTPATIENT
Start: 2022-06-05 | End: 2022-06-05

## 2022-06-05 RX ORDER — ASPIRIN 81 MG/1
324 TABLET, CHEWABLE ORAL ONCE
Status: COMPLETED | OUTPATIENT
Start: 2022-06-05 | End: 2022-06-05

## 2022-06-05 RX ORDER — ACETAMINOPHEN 500 MG
1000 TABLET ORAL ONCE
Status: COMPLETED | OUTPATIENT
Start: 2022-06-05 | End: 2022-06-05

## 2022-06-05 RX ORDER — CEFDINIR 300 MG/1
300 CAPSULE ORAL 2 TIMES DAILY
Qty: 20 CAPSULE | Refills: 0 | Status: SHIPPED | OUTPATIENT
Start: 2022-06-05 | End: 2022-06-15

## 2022-06-05 RX ORDER — DOXYCYCLINE HYCLATE 100 MG/1
100 CAPSULE ORAL ONCE
Status: COMPLETED | OUTPATIENT
Start: 2022-06-05 | End: 2022-06-05

## 2022-06-05 RX ORDER — DOXYCYCLINE 100 MG/1
100 CAPSULE ORAL 2 TIMES DAILY
Qty: 20 CAPSULE | Refills: 0 | Status: SHIPPED | OUTPATIENT
Start: 2022-06-05 | End: 2022-06-15

## 2022-06-05 RX ORDER — CEFDINIR 300 MG/1
300 CAPSULE ORAL ONCE
Status: COMPLETED | OUTPATIENT
Start: 2022-06-05 | End: 2022-06-05

## 2022-06-05 RX ORDER — IBUPROFEN 800 MG/1
800 TABLET ORAL EVERY 8 HOURS PRN
Qty: 21 TABLET | Refills: 0 | Status: SHIPPED | OUTPATIENT
Start: 2022-06-05 | End: 2022-08-18

## 2022-06-05 RX ADMIN — SODIUM CHLORIDE: 9 INJECTION, SOLUTION INTRAVENOUS at 01:44

## 2022-06-05 RX ADMIN — DOXYCYCLINE HYCLATE 100 MG: 100 CAPSULE ORAL at 03:49

## 2022-06-05 RX ADMIN — CEFDINIR 300 MG: 300 CAPSULE ORAL at 03:49

## 2022-06-05 RX ADMIN — ACETAMINOPHEN 1000 MG: 500 TABLET ORAL at 01:44

## 2022-06-05 RX ADMIN — KETOROLAC TROMETHAMINE 15 MG: 30 INJECTION, SOLUTION INTRAMUSCULAR at 01:42

## 2022-06-05 RX ADMIN — ASPIRIN 324 MG: 81 TABLET, CHEWABLE ORAL at 01:42

## 2022-06-05 RX ADMIN — IOPAMIDOL 75 ML: 755 INJECTION, SOLUTION INTRAVENOUS at 02:37

## 2022-06-05 ASSESSMENT — LIFESTYLE VARIABLES: HOW OFTEN DO YOU HAVE A DRINK CONTAINING ALCOHOL: 2-4 TIMES A MONTH

## 2022-06-05 ASSESSMENT — PAIN - FUNCTIONAL ASSESSMENT: PAIN_FUNCTIONAL_ASSESSMENT: 0-10

## 2022-06-05 ASSESSMENT — PAIN DESCRIPTION - FREQUENCY: FREQUENCY: CONTINUOUS

## 2022-06-05 ASSESSMENT — PAIN SCALES - GENERAL
PAINLEVEL_OUTOF10: 10
PAINLEVEL_OUTOF10: 10
PAINLEVEL_OUTOF10: 2

## 2022-06-05 ASSESSMENT — PAIN DESCRIPTION - PAIN TYPE: TYPE: ACUTE PAIN

## 2022-06-05 ASSESSMENT — PAIN DESCRIPTION - LOCATION: LOCATION: CHEST

## 2022-06-05 ASSESSMENT — PAIN DESCRIPTION - ORIENTATION: ORIENTATION: LEFT

## 2022-06-05 ASSESSMENT — PAIN DESCRIPTION - ONSET: ONSET: SUDDEN

## 2022-06-05 NOTE — ED PROVIDER NOTES
Department of Emergency Medicine   ED  Provider Note  Admit Date/RoomTime: 6/5/2022 12:32 AM  ED Room: 06/06          History of Present Illness:  6/5/22, Time: 2:09 AM EDT  Chief Complaint   Patient presents with    Chest Pain     Pt reports chest pain that began Friday evening. Pt reports the pain to the left side that worsens with deep breaths. Pt states that he recently had pneumonia. Pain began after he was doing yard work and initially thought he pulled a muscle. Frank Arevalo is a 73 y.o. male presenting to the ED for left sided chest pain, beginning a 1 day. The complaint has been intermittent, moderate in severity, and worsened by deep breath. Patient presents for left-sided chest pain. Reports began 1 day ago, intermittent but worse when he takes a deep breath in. Denies cough but does states he has been sweating at night recently, was admitted in April for hypoxic respiratory failure pneumonia, completed Levaquin treatment on 4/28/2022. Denies nausea vomiting, recent travel, denies anosmia or ageusia. Did put heat pack on the left side of his chest but did not improve his pain    Review of Systems:   Pertinent positives and negatives are stated within HPI, all other systems reviewed and are negative.        --------------------------------------------- PAST HISTORY ---------------------------------------------  Past Medical History:  has a past medical history of Aneurysm artery, pulmonary ([facility] [address] Arthritis, Asthma, Cancer ([facility] [address] Deviated septum, Hepatitis B, Hypercholesterolemia, Hypertension, Kidney stone, Restless leg syndrome, and Rheumatoid arthritis(714.0). Past Surgical History:  has a past surgical history that includes hernia repair (Left); cardiovascular stress test (1994); Tonsillectomy; hernia repair; Cataract removal (Bilateral, 2002); Colonoscopy; Diagnostic Cardiac Cath Lab Procedure; Vasectomy; Adenoidectomy; cyst removal; skin biopsy (03/27/2018);  Cardiac valuve replacement (06/16/2020); and Cardiac catheterization. Social History:  reports that he quit smoking about 46 years ago. He started smoking about 56 years ago. He has a 5.00 pack-year smoking history. He has never used smokeless tobacco. He reports that he does not drink alcohol and does not use drugs. Family History: family history is not on file. He was adopted. . Unless otherwise noted, family history is non contributory    The patients home medications have been reviewed. Allergies: Statins [statins]        ---------------------------------------------------PHYSICAL EXAM--------------------------------------    Constitutional/General: Alert and oriented x3  Head: Normocephalic and atraumatic  Eyes: PERRL, EOMI, sclera non icteric  Mouth: Oropharynx clear, handling secretions, no trismus, no asymmetry of the posterior oropharynx or uvular edema  Neck: Supple, full ROM, no stridor, no meningeal signs  Respiratory: Lungs diminished throughout, scattered rhonchi in the left,Not in respiratory distress  Cardiovascular:  Regular rate. Regular rhythm. 2+ distal pulses. Equal extremity pulses. Chest: No chest wall tenderness there is redness on the left anterior chest wall consistent with that heat pack patient had on  GI:  Abdomen Soft, Non tender, Non distended. No rebound, guarding, or rigidity. Musculoskeletal: Moves all extremities x 4. Warm and well perfused, no clubbing, cyanosis, or edema. Capillary refill <3 seconds  Integument: skin warm and dry. No rashes. Neurologic: GCS 15, no focal deficits, symmetric strength 5/5 in the upper and lower extremities bilaterally  Psychiatric: Normal Affect      EKG: Interpreted by emergency department physician, Dr. Mark Leary   This EKG is signed and interpreted by me.     Rate: 66  Rhythm: Sinus  Interpretation: NSR, inc RBBB, , , , Non Specific ST Changes    Comparison: no previous EKG available      -------------------------------------------------- RESULTS -------------------------------------------------  I have personally reviewed all laboratory and imaging results for this patient. Results are listed below.      LABS: (Lab results interpreted by me)  Results for orders placed or performed during the hospital encounter of 06/05/22   COVID-19 & Influenza Combo    Specimen: Nasopharyngeal Swab   Result Value Ref Range    SARS-CoV-2 RNA, RT PCR NOT DETECTED NOT DETECTED    INFLUENZA A NOT DETECTED NOT DETECTED    INFLUENZA B NOT DETECTED NOT DETECTED   Troponin   Result Value Ref Range    Troponin, High Sensitivity 19 (H) 0 - 11 ng/L   CBC with Auto Differential   Result Value Ref Range    WBC 18.5 (H) 4.5 - 11.5 E9/L    RBC 4.11 3.80 - 5.80 E12/L    Hemoglobin 13.1 12.5 - 16.5 g/dL    Hematocrit 39.6 37.0 - 54.0 %    MCV 96.4 80.0 - 99.9 fL    MCH 31.9 26.0 - 35.0 pg    MCHC 33.1 32.0 - 34.5 %    RDW 13.5 11.5 - 15.0 fL    Platelets 788 838 - 325 E9/L    MPV 10.0 7.0 - 12.0 fL    Neutrophils % 82.6 (H) 43.0 - 80.0 %    Immature Granulocytes % 0.5 0.0 - 5.0 %    Lymphocytes % 6.9 (L) 20.0 - 42.0 %    Monocytes % 8.5 2.0 - 12.0 %    Eosinophils % 1.2 0.0 - 6.0 %    Basophils % 0.3 0.0 - 2.0 %    Neutrophils Absolute 15.30 (H) 1.80 - 7.30 E9/L    Immature Granulocytes # 0.10 E9/L    Lymphocytes Absolute 1.27 (L) 1.50 - 4.00 E9/L    Monocytes Absolute 1.58 (H) 0.10 - 0.95 E9/L    Eosinophils Absolute 0.23 0.05 - 0.50 E9/L    Basophils Absolute 0.06 0.00 - 0.20 E9/L    Poikilocytes 1+     Ovalocytes 1+    Comprehensive Metabolic Panel   Result Value Ref Range    Sodium 136 132 - 146 mmol/L    Potassium 3.8 3.5 - 5.0 mmol/L    Chloride 102 98 - 107 mmol/L    CO2 22 22 - 29 mmol/L    Anion Gap 12 7 - 16 mmol/L    Glucose 150 (H) 74 - 99 mg/dL    BUN 27 (H) 6 - 23 mg/dL    CREATININE 1.0 0.7 - 1.2 mg/dL    GFR Non-African American >60 >=60 mL/min/1.73    GFR African American >60     Calcium 9.0 8.6 - 10.2 mg/dL    Total Protein 6.9 6.4 - 8.3 g/dL    Albumin 4.1 3.5 - 5.2 g/dL    Total Bilirubin 1.6 (H) 0.0 - 1.2 mg/dL    Alkaline Phosphatase 61 40 - 129 U/L    ALT 17 0 - 40 U/L    AST 21 0 - 39 U/L   D-Dimer, Quantitative   Result Value Ref Range    D-Dimer, Quant 700 ng/mL DDU   Lactate, Sepsis   Result Value Ref Range    Lactic Acid, Sepsis 1.2 0.5 - 1.9 mmol/L   Brain Natriuretic Peptide   Result Value Ref Range    Pro- (H) 0 - 125 pg/mL   Troponin   Result Value Ref Range    Troponin, High Sensitivity 19 (H) 0 - 11 ng/L   ,       RADIOLOGY:  Interpreted by Radiologist unless otherwise specified  CTA PULMONARY W CONTRAST   Final Result   No evidence of pulmonary embolism or acute pulmonary abnormality. Consolidation/pneumonia identified in the left upper lobe. XR CHEST PORTABLE   Final Result   Focal opacification in the left  mid-lung, pneumonia can not be excluded. Peewee Ra ------------------------- NURSING NOTES AND VITALS REVIEWED ---------------------------   The nursing notes within the ED encounter and vital signs as below have been reviewed by myself  /68   Pulse 72   Temp 98.5 °F (36.9 °C) (Oral)   Resp 20   Ht 5' 5\" (1.651 m)   Wt 139 lb (63 kg)   SpO2 97%   BMI 23.13 kg/m²     Oxygen Saturation Interpretation: Normal    The cardiac monitor revealed NSR with a heart rate in the 70s as interpreted by me. The cardiac monitor was ordered secondary to the patient's heart rate and to monitor the patient for dysrhythmia. CPT 17851    The patients available past medical records and past encounters were reviewed.         ------------------------------ ED COURSE/MEDICAL DECISION MAKING----------------------  Medications   0.9 % sodium chloride infusion (0 mL/hr IntraVENous Stopped 6/5/22 0404)   acetaminophen (TYLENOL) tablet 1,000 mg (1,000 mg Oral Given 6/5/22 0144)   aspirin chewable tablet 324 mg (324 mg Oral Given 6/5/22 0142)   ketorolac (TORADOL) injection 15 mg (15 mg IntraVENous Given 6/5/22 0142)   iopamidol (ISOVUE-370) 76 % injection 75 mL (75 mLs IntraVENous Given 6/5/22 7657)   cefdinir (OMNICEF) capsule 300 mg (300 mg Oral Given 6/5/22 4469)   doxycycline hyclate (VIBRAMYCIN) capsule 100 mg (100 mg Oral Given 6/5/22 0349)                    Medical Decision Making:     I, Dr. Nelia Vasquez am the primary provider of record    Work-up initiated, patient did have elevated D-dimer CTA was ordered, no pulmonary embolism however he does have left upper lobe pneumonia, he is not hypoxic, he tolerated p.o. antibiotics, given antibiotics within the last 45 days we will use Omnicef and doxycycline, discussed importance of follow-up with primary care will have return if worsening signs or symptoms      Re-Evaluations:          Re-evaluation. Patients symptoms are improving  Repeat physical examination is improved       Re-evaluation. Patients symptoms show no change  Repeat physical examination is not changed        This patient's ED course included: a personal history and physicial examination, re-evaluation prior to disposition, IV medications, cardiac monitoring, continuous pulse oximetry and complex medical decision making and emergency management    This patient has been closely monitored during their ED course. Counseling: The emergency provider has spoken with the patient and spouse/SO and discussed todays results, in addition to providing specific details for the plan of care and counseling regarding the diagnosis and prognosis. Questions are answered at this time and they are agreeable with the plan.       --------------------------------- IMPRESSION AND DISPOSITION ---------------------------------    IMPRESSION  1. Pneumonia of left upper lobe due to infectious organism    2.  Left-sided chest pain        DISPOSITION  Disposition: Discharge to home  Patient condition is stable        NOTE: This report was transcribed using voice recognition software.  Every effort was made to ensure accuracy; however, inadvertent computerized transcription errors may be present       Chavez Cox DO  06/05/22 0401

## 2022-06-06 ENCOUNTER — TELEPHONE (OUTPATIENT)
Dept: PRIMARY CARE CLINIC | Age: 72
End: 2022-06-06

## 2022-06-06 ENCOUNTER — OFFICE VISIT (OUTPATIENT)
Dept: PRIMARY CARE CLINIC | Age: 72
End: 2022-06-06
Payer: MEDICARE

## 2022-06-06 VITALS
WEIGHT: 144 LBS | BODY MASS INDEX: 23.96 KG/M2 | SYSTOLIC BLOOD PRESSURE: 127 MMHG | OXYGEN SATURATION: 96 % | DIASTOLIC BLOOD PRESSURE: 78 MMHG | HEART RATE: 80 BPM | TEMPERATURE: 98.2 F

## 2022-06-06 DIAGNOSIS — I48.0 PAROXYSMAL ATRIAL FIBRILLATION (HCC): Chronic | ICD-10-CM

## 2022-06-06 DIAGNOSIS — J18.9 PNEUMONIA OF LEFT UPPER LOBE DUE TO INFECTIOUS ORGANISM: Primary | ICD-10-CM

## 2022-06-06 DIAGNOSIS — I10 ESSENTIAL HYPERTENSION: Chronic | ICD-10-CM

## 2022-06-06 DIAGNOSIS — J43.8 OTHER EMPHYSEMA (HCC): Chronic | ICD-10-CM

## 2022-06-06 PROCEDURE — 3017F COLORECTAL CA SCREEN DOC REV: CPT | Performed by: FAMILY MEDICINE

## 2022-06-06 PROCEDURE — 1123F ACP DISCUSS/DSCN MKR DOCD: CPT | Performed by: FAMILY MEDICINE

## 2022-06-06 PROCEDURE — 1036F TOBACCO NON-USER: CPT | Performed by: FAMILY MEDICINE

## 2022-06-06 PROCEDURE — G8420 CALC BMI NORM PARAMETERS: HCPCS | Performed by: FAMILY MEDICINE

## 2022-06-06 PROCEDURE — G8428 CUR MEDS NOT DOCUMENT: HCPCS | Performed by: FAMILY MEDICINE

## 2022-06-06 PROCEDURE — 3023F SPIROM DOC REV: CPT | Performed by: FAMILY MEDICINE

## 2022-06-06 PROCEDURE — 99214 OFFICE O/P EST MOD 30 MIN: CPT | Performed by: FAMILY MEDICINE

## 2022-06-06 ASSESSMENT — ENCOUNTER SYMPTOMS
ALLERGIC/IMMUNOLOGIC NEGATIVE: 1
EYES NEGATIVE: 1
GASTROINTESTINAL NEGATIVE: 1

## 2022-06-06 NOTE — PROGRESS NOTES
22  Name: Hayley Gillette    : 1950    Sex: male    Age: 73 y.o. Subjective:  Chief Complaint: Patient is here for  Er follow up   With  pneumonia     Er with   Cp  And    Dx with  leftupper loobe pneuonia    And   This area  New  Not there in paril    La bwith elev   Wbc  He talked to    ccf  pulm and   Re do  cxr in 6 weeks   But that was before er  He nto use lab neb at home    No  couhg  No   Sob  No   Wheezing  Left upper chest sl sore with deep breath    On  cefidnir and    Doxy  He went back to  Rehab          Review of Systems   Constitutional: Negative. Negative for chills, diaphoresis, fatigue and fever. HENT: Negative. Eyes: Negative. Cardiovascular: Negative. Gastrointestinal: Negative. Endocrine: Negative. Genitourinary: Negative. Musculoskeletal: Negative. Skin: Negative. Allergic/Immunologic: Negative. Neurological: Negative. Hematological: Negative. Psychiatric/Behavioral: Negative.           Current Outpatient Medications:     cefdinir (OMNICEF) 300 MG capsule, Take 1 capsule by mouth 2 times daily for 10 days, Disp: 20 capsule, Rfl: 0    doxycycline monohydrate (MONODOX) 100 MG capsule, Take 1 capsule by mouth 2 times daily for 10 days, Disp: 20 capsule, Rfl: 0    ibuprofen (IBU) 800 MG tablet, Take 1 tablet by mouth every 8 hours as needed for Pain, Disp: 21 tablet, Rfl: 0    vitamin B-12 (CYANOCOBALAMIN) 1000 MCG tablet, Take 1,000 mcg by mouth daily, Disp: , Rfl:     albuterol (PROVENTIL) (2.5 MG/3ML) 0.083% nebulizer solution, Take 3 mLs by nebulization every 6 hours as needed for Wheezing, Disp: 360 each, Rfl: 3    Calcium Carbonate-Vit D-Min (CALCIUM 1200 PO), Take by mouth daily , Disp: , Rfl:     Albuterol Sulfate (PROAIR HFA IN), Inhale 2 puffs into the lungs as needed , Disp: , Rfl:     tamsulosin (FLOMAX) 0.4 MG capsule, Take 0.4 mg by mouth nightly , Disp: , Rfl:     calcium carbonate 600 MG TABS tablet, Take 1 tablet by mouth daily, Disp: , Rfl:     mometasone-formoterol (DULERA) 200-5 MCG/ACT inhaler, Inhale 2 puffs into the lungs every 12 hours, Disp: , Rfl:     fluticasone (FLONASE) 50 MCG/ACT nasal spray, 1 spray by Each Nostril route daily, Disp: , Rfl:     Multiple Vitamins-Minerals (PRESERVISION AREDS 2+MULTI VIT PO), Take 1 capsule by mouth daily, Disp: , Rfl:     acetaminophen (TYLENOL) 500 MG tablet, Take 500 mg by mouth every 6 hours as needed for Pain, Disp: , Rfl:     aspirin 81 MG chewable tablet, Take 81 mg by mouth daily, Disp: , Rfl:     atorvastatin (LIPITOR) 10 MG tablet, Take 10 mg by mouth nightly, Disp: , Rfl:   Allergies   Allergen Reactions    Statins [Statins]      Caused muscle aches and weakness. Social History     Socioeconomic History    Marital status:      Spouse name: Not on file    Number of children: Not on file    Years of education: Not on file    Highest education level: Not on file   Occupational History    Not on file   Tobacco Use    Smoking status: Former Smoker     Packs/day: 2.50     Years: 2.00     Pack years: 5.00     Start date:      Quit date: 1975     Years since quittin.11    Smokeless tobacco: Never Used    Tobacco comment: Quit in 67. 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   Was on prednisone for 40 years has been off for 7 years    Macular degeneration with loss of vision left eye-----sees   eye care    Coronary artery disease sees Dr. Yakelin Vo at [facility]    Aortic valve surgery with repair of PFO January 2020 [facility]    Hepatitis B from his work sees gastroenterology [facility]    Kidney stones    Restless leg syndrome    Rheumatoid arthritis sees rheumatology [facility]    Ultrasound September 2020 on clinical clinic mentions gallbladder sludge which she is unaware    BPH sees Dr. Claude Linear sees Dr. Sharmila Ham    S/p multiple hernias    Colonoscopy will be due soon with Dr. Odalis Batista    Paroxysmal atrial fibrillation    Mitral regurgitation    Admit   4-22 with pneumonia  and resp distress    Brought me bottle of home made [facility]   5-22    Er with left upper lobe pneumonia----6-22     Social Determinants of Health     Financial Resource Strain:     Difficulty of Paying Living Expenses: Not on file   Food Insecurity:     Worried About [address] in the Last Year: Not on file    Annia of Food in the Last Year: Not on file   Transportation Needs:     Lack of Transportation (Medical): Not on file    Lack of Transportation (Non-Medical):  Not on file   Physical Activity: Inactive    Days of Exercise per Week: 0 days    Minutes of Exercise per Session: 0 min   Stress:     Feeling of Stress : Not on file   Social Connections:     Frequency of Communication with Friends and Family: Not on file    Frequency of Social Gatherings with Friends and Family: Not on file    Attends Taoist Services: Not on file    Active Member of Clubs or Organizations: Not on file    Attends Club or Organization Meetings: Not on file    Marital Status: Not on file   Intimate Partner Violence:     Fear of Current or Ex-Partner: Not on file    Emotionally Abused: Not on file    Physically Abused: Not on file    Sexually Abused: Not on file   Housing Stability:     Unable to Pay for Housing in the Last Year: Not on file    Number of Places Lived in the Last Year: Not on file    Unstable Housing in the Last Year: Not on file      Past Medical History:   Diagnosis Date    Aneurysm artery, pulmonary ([facility] [address]     Arthritis     rheumatoid    Asthma 1974    status asthmaticus in 3/2004.  Cancer ([facility] [address]     basal cell to face    Deviated septum     S/P repair x 3.    Hepatitis B     Hypercholesterolemia     Intolerance to statins.  Hypertension     Kidney stone     Restless leg syndrome 3/2004    Negative sleep study for sleep apnea. Positive for restless leg syndrome.  Rheumatoid arthritis(714.0)      Family History   Adopted: Yes      Past Surgical History:   Procedure Laterality Date    ADENOIDECTOMY      CARDIAC CATHETERIZATION      CARDIAC VALVE SURGERY  06/16/2020    AVR and repair of ascending aortic aneurysm in [place] CARDIOVASCULAR STRESS TEST  1994    Stress-induced ischemia involving the interventricular septum    CATARACT REMOVAL Bilateral 2002    COLONOSCOPY      CYST REMOVAL      DIAGNOSTIC CARDIAC CATH LAB PROCEDURE      Normal per patient (this was done by Dr. Ellie Real and records are not available.)    HERNIA REPAIR Left     HERNIA REPAIR      Right luminal hernia repair.  SKIN BIOPSY  03/27/2018    Dr Imelda Harper cheek-ulcerated basal cell carcinoma    TONSILLECTOMY      VASECTOMY        Vitals:    06/06/22 1432   BP: 127/78   Pulse: 80   Temp: 98.2 °F (36.8 °C)   TempSrc: Infrared   SpO2: 96%   Weight: 144 lb (65.3 kg)       Objective:    Physical Exam  Vitals reviewed. Constitutional:       Appearance: He is well-developed. HENT:      Head: Normocephalic. Eyes:      Pupils: Pupils are equal, round, and reactive to light. Cardiovascular:      Rate and Rhythm: Normal rate and regular rhythm. Pulmonary:      Effort: Pulmonary effort is normal.      Breath sounds: Normal breath sounds. Abdominal:      Palpations: Abdomen is soft. Musculoskeletal:         General: Normal range of motion. Cervical back: Normal range of motion.    Skin: General: Skin is warm. Neurological:      Mental Status: He is alert and oriented to person, place, and time. Psychiatric:         Behavior: Behavior normal.         Tori Price was seen today for ed follow-up. Diagnoses and all orders for this visit:    Pneumonia of left upper lobe due to infectious organism    Essential hypertension    Paroxysmal atrial fibrillation (HCC)    Other emphysema ([facility] [address]        Comments:  fuw with [facility] pulm today     Inc fluids    Lb neb  At lest  Bid         I educated the patient about all medications. Make sure they were correct and educated  on the risk associated with missing meds or taking them incorrectly. A list of medications is being sent home with patient today. Check blood pressure at home twice a day. Low-salt low caffeine diet. Call if systolic blood pressure is above 387 and diastolic blood pressures above 85. Only use a upper arm digital cuff. I informed patient about the risk associated with noncompliance of medication and taking medications incorrectly. Appropriate follow-up with myself and all specialist.  Encourage family members to take active role in assisting with medications and medical care. If any confusion should develop to notify my office immediately to avoid risk of worsening medical condition    A great deal of time spent reviewing medications, diet, exercise, social issues. Also reviewing the chart before entering the room with patient and finishing charting after leaving patient's room. More than half of that time was spent face to face with the patient in counseling and coordinating care. Follow Up: Return if symptoms worsen or fail to improve, for Reg Appt, Meds. If worse go to ER. Not better in 24 hr see.      Seen by:  Renata Kirkland DO

## 2022-06-07 LAB
EKG ATRIAL RATE: 66 BPM
EKG P AXIS: 37 DEGREES
EKG P-R INTERVAL: 152 MS
EKG Q-T INTERVAL: 370 MS
EKG QRS DURATION: 112 MS
EKG QTC CALCULATION (BAZETT): 387 MS
EKG R AXIS: -29 DEGREES
EKG T AXIS: 86 DEGREES
EKG VENTRICULAR RATE: 66 BPM

## 2022-06-10 LAB
BLOOD CULTURE, ROUTINE: NORMAL
CULTURE, BLOOD 2: NORMAL

## 2022-06-13 PROCEDURE — 9900000038 HC CARDIAC REHAB PHASE 3 - MONTHLY

## 2022-06-27 ENCOUNTER — HOSPITAL ENCOUNTER (OUTPATIENT)
Dept: CARDIAC REHAB | Age: 72
Discharge: HOME | End: 2022-06-27

## 2022-07-25 ENCOUNTER — HOSPITAL ENCOUNTER (OUTPATIENT)
Dept: CARDIAC REHAB | Age: 72
Discharge: HOME | End: 2022-07-25

## 2022-07-25 PROCEDURE — 9900000038 HC CARDIAC REHAB PHASE 3 - MONTHLY

## 2022-08-18 ENCOUNTER — OFFICE VISIT (OUTPATIENT)
Dept: PRIMARY CARE CLINIC | Age: 72
End: 2022-08-18
Payer: MEDICARE

## 2022-08-18 VITALS — WEIGHT: 145 LBS | TEMPERATURE: 97.9 F | BODY MASS INDEX: 24.13 KG/M2

## 2022-08-18 DIAGNOSIS — E78.2 MIXED HYPERLIPIDEMIA: Chronic | ICD-10-CM

## 2022-08-18 DIAGNOSIS — I10 ESSENTIAL HYPERTENSION: Primary | Chronic | ICD-10-CM

## 2022-08-18 DIAGNOSIS — E78.2 MIXED HYPERLIPIDEMIA: ICD-10-CM

## 2022-08-18 DIAGNOSIS — D51.8 VITAMIN B12 DEFICIENCY (DIETARY) ANEMIA: ICD-10-CM

## 2022-08-18 DIAGNOSIS — I48.0 PAROXYSMAL ATRIAL FIBRILLATION (HCC): Chronic | ICD-10-CM

## 2022-08-18 DIAGNOSIS — I10 ESSENTIAL HYPERTENSION: ICD-10-CM

## 2022-08-18 DIAGNOSIS — J43.8 OTHER EMPHYSEMA (HCC): Chronic | ICD-10-CM

## 2022-08-18 DIAGNOSIS — E11.9 DIET-CONTROLLED DIABETES MELLITUS (HCC): ICD-10-CM

## 2022-08-18 DIAGNOSIS — M81.0 AGE-RELATED OSTEOPOROSIS WITHOUT CURRENT PATHOLOGICAL FRACTURE: ICD-10-CM

## 2022-08-18 DIAGNOSIS — E55.9 VITAMIN D DEFICIENCY: ICD-10-CM

## 2022-08-18 DIAGNOSIS — I25.10 ATHEROSCLEROSIS OF NATIVE CORONARY ARTERY OF NATIVE HEART WITHOUT ANGINA PECTORIS: Chronic | ICD-10-CM

## 2022-08-18 DIAGNOSIS — E53.8 VITAMIN B 12 DEFICIENCY: ICD-10-CM

## 2022-08-18 LAB
ALBUMIN SERPL-MCNC: 4.7 G/DL (ref 3.5–5.2)
ALP BLD-CCNC: 56 U/L (ref 40–129)
ALT SERPL-CCNC: 23 U/L (ref 0–40)
ANION GAP SERPL CALCULATED.3IONS-SCNC: 10 MMOL/L (ref 7–16)
AST SERPL-CCNC: 31 U/L (ref 0–39)
BASOPHILS ABSOLUTE: 0.08 E9/L (ref 0–0.2)
BASOPHILS RELATIVE PERCENT: 1 % (ref 0–2)
BILIRUB SERPL-MCNC: 1.5 MG/DL (ref 0–1.2)
BUN BLDV-MCNC: 17 MG/DL (ref 6–23)
CALCIUM SERPL-MCNC: 9.6 MG/DL (ref 8.6–10.2)
CHLORIDE BLD-SCNC: 106 MMOL/L (ref 98–107)
CHOLESTEROL, TOTAL: 130 MG/DL (ref 0–199)
CO2: 27 MMOL/L (ref 22–29)
CREAT SERPL-MCNC: 1 MG/DL (ref 0.7–1.2)
EOSINOPHILS ABSOLUTE: 0.53 E9/L (ref 0.05–0.5)
EOSINOPHILS RELATIVE PERCENT: 6.6 % (ref 0–6)
GFR AFRICAN AMERICAN: >60
GFR NON-AFRICAN AMERICAN: >60 ML/MIN/1.73
GLUCOSE BLD-MCNC: 85 MG/DL (ref 74–99)
HBA1C MFR BLD: 5.2 % (ref 4–5.6)
HCT VFR BLD CALC: 41.5 % (ref 37–54)
HDLC SERPL-MCNC: 54 MG/DL
HEMOGLOBIN: 13.8 G/DL (ref 12.5–16.5)
IMMATURE GRANULOCYTES #: 0.04 E9/L
IMMATURE GRANULOCYTES %: 0.5 % (ref 0–5)
LDL CHOLESTEROL CALCULATED: 66 MG/DL (ref 0–99)
LYMPHOCYTES ABSOLUTE: 1.94 E9/L (ref 1.5–4)
LYMPHOCYTES RELATIVE PERCENT: 24 % (ref 20–42)
MCH RBC QN AUTO: 33.4 PG (ref 26–35)
MCHC RBC AUTO-ENTMCNC: 33.3 % (ref 32–34.5)
MCV RBC AUTO: 100.5 FL (ref 80–99.9)
MONOCYTES ABSOLUTE: 0.67 E9/L (ref 0.1–0.95)
MONOCYTES RELATIVE PERCENT: 8.3 % (ref 2–12)
NEUTROPHILS ABSOLUTE: 4.82 E9/L (ref 1.8–7.3)
NEUTROPHILS RELATIVE PERCENT: 59.6 % (ref 43–80)
PDW BLD-RTO: 13.2 FL (ref 11.5–15)
PLATELET # BLD: 141 E9/L (ref 130–450)
PMV BLD AUTO: 11 FL (ref 7–12)
POTASSIUM SERPL-SCNC: 4.5 MMOL/L (ref 3.5–5)
RBC # BLD: 4.13 E12/L (ref 3.8–5.8)
SODIUM BLD-SCNC: 143 MMOL/L (ref 132–146)
TOTAL PROTEIN: 7.2 G/DL (ref 6.4–8.3)
TRIGL SERPL-MCNC: 49 MG/DL (ref 0–149)
VITAMIN B-12: 1065 PG/ML (ref 211–946)
VITAMIN D 25-HYDROXY: 51 NG/ML (ref 30–100)
VLDLC SERPL CALC-MCNC: 10 MG/DL
WBC # BLD: 8.1 E9/L (ref 4.5–11.5)

## 2022-08-18 PROCEDURE — 1036F TOBACCO NON-USER: CPT | Performed by: FAMILY MEDICINE

## 2022-08-18 PROCEDURE — 99214 OFFICE O/P EST MOD 30 MIN: CPT | Performed by: FAMILY MEDICINE

## 2022-08-18 PROCEDURE — G8420 CALC BMI NORM PARAMETERS: HCPCS | Performed by: FAMILY MEDICINE

## 2022-08-18 PROCEDURE — 3023F SPIROM DOC REV: CPT | Performed by: FAMILY MEDICINE

## 2022-08-18 PROCEDURE — 1123F ACP DISCUSS/DSCN MKR DOCD: CPT | Performed by: FAMILY MEDICINE

## 2022-08-18 PROCEDURE — G8428 CUR MEDS NOT DOCUMENT: HCPCS | Performed by: FAMILY MEDICINE

## 2022-08-18 PROCEDURE — 2022F DILAT RTA XM EVC RTNOPTHY: CPT | Performed by: FAMILY MEDICINE

## 2022-08-18 PROCEDURE — 3044F HG A1C LEVEL LT 7.0%: CPT | Performed by: FAMILY MEDICINE

## 2022-08-18 PROCEDURE — 3017F COLORECTAL CA SCREEN DOC REV: CPT | Performed by: FAMILY MEDICINE

## 2022-08-18 ASSESSMENT — ENCOUNTER SYMPTOMS
EYES NEGATIVE: 1
RESPIRATORY NEGATIVE: 1
ALLERGIC/IMMUNOLOGIC NEGATIVE: 1
GASTROINTESTINAL NEGATIVE: 1

## 2022-08-18 NOTE — PROGRESS NOTES
22  Name: Dion [facility]    : 1950    Sex: male    Age: 73 y.o. Subjective:  Chief Complaint: Patient is here for  6 mo ck  re   chol AF   bp    Mitral  valve    RA     mad  degen   bph    Feels ok b raitn  ok    no cp or sob     Saw rheum and for   dexa soon      Seeing  surg and  cardio [facility] soon  No cp or sob    Bph sx seeing dr Sri martínez soon      Review of Systems   Constitutional: Negative. HENT: Negative. Eyes: Negative. Respiratory: Negative. Cardiovascular: Negative. Gastrointestinal: Negative. Endocrine: Negative. Genitourinary: Negative. Musculoskeletal: Negative. Skin: Negative. Allergic/Immunologic: Negative. Neurological: Negative. Hematological: Negative. Psychiatric/Behavioral: Negative. Current Outpatient Medications:     vitamin B-12 (CYANOCOBALAMIN) 1000 MCG tablet, Take 1,000 mcg by mouth daily, Disp: , Rfl:     albuterol (PROVENTIL) (2.5 MG/3ML) 0.083% nebulizer solution, Take 3 mLs by nebulization every 6 hours as needed for Wheezing, Disp: 360 each, Rfl: 3    Calcium Carbonate-Vit D-Min (CALCIUM 1200 PO), Take by mouth daily , Disp: , Rfl:     tamsulosin (FLOMAX) 0.4 MG capsule, Take 0.4 mg by mouth nightly , Disp: , Rfl:     calcium carbonate 600 MG TABS tablet, Take 1 tablet by mouth daily, Disp: , Rfl:     mometasone-formoterol (DULERA) 200-5 MCG/ACT inhaler, Inhale 2 puffs into the lungs every 12 hours, Disp: , Rfl:     fluticasone (FLONASE) 50 MCG/ACT nasal spray, 1 spray by Each Nostril route daily, Disp: , Rfl:     Multiple Vitamins-Minerals (PRESERVISION AREDS 2+MULTI VIT PO), Take 1 capsule by mouth daily, Disp: , Rfl:     aspirin 81 MG chewable tablet, Take 81 mg by mouth daily, Disp: , Rfl:     atorvastatin (LIPITOR) 10 MG tablet, Take 10 mg by mouth nightly, Disp: , Rfl:   Allergies   Allergen Reactions    Statins [Statins]      Caused muscle aches and weakness.      Social History     Socioeconomic History    Marital status:      Spouse name: Not on file    Number of children: Not on file    Years of education: Not on file    Highest education level: Not on file   Occupational History    Not on file   Tobacco Use    Smoking status: Former     Packs/day: 2.50     Years: 2.00     Pack years: 5.00     Types: Cigarettes     Start date: 1     Quit date: 1975     Years since quittin.7    Smokeless tobacco: Never    Tobacco comments:     Quit in . Vaping Use    Vaping Use: Never used   Substance and Sexual Activity    Alcohol use: No     Comment: not in 5 years    Drug use: No    Sexual activity: Not on file   Other Topics Concern    Not on file   Social History Narrative    Adopted    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   Was on prednisone for 40 years has been off for 7 years    Macular degeneration with loss of vision left eye-----sees   eye care    Coronary artery disease sees Dr. Jazzy Davis at [facility]    Aortic valve surgery with repair of PFO 2020 [facility]    Hepatitis B from his work sees gastroenterology [facility]    Kidney stones    Restless leg syndrome    Rheumatoid arthritis sees rheumatology [facility]    Ultrasound 2020 on clinical clinic mentions gallbladder sludge which she is unaware    BPH sees Dr. Bee Chance sees Dr. Raymond Blocker    S/p multiple hernias    Colonoscopy will be due soon with Dr. Worley Dakin    Paroxysmal atrial fibrillation    Mitral regurgitation    Admit    with pneumonia  and resp distress    Brought me bottle of home made [facility]       Er with left upper lobe pneumonia----     Social Determinants of Health     Financial Resource Strain: Not on file   Food Insecurity: Not on file   Transportation Needs: Not on file   Physical Activity: Inactive    Days of Exercise per Week: 0 days    Minutes of Exercise per Session: 0 min   Stress: Not on file   Social Connections: Not on file   Intimate Partner Violence: Not on file   Housing Stability: Not on file      Past Medical History:   Diagnosis Date    Acute respiratory failure ([facility] [address] 4/23/2022    Aneurysm artery, pulmonary (HCC)     Arthritis     rheumatoid    Asthma 1974    status asthmaticus in 3/2004. Cancer ([facility] [address]     basal cell to face    Community acquired pneumonia of left lower lobe of lung     Deviated septum     S/P repair x 3. Hepatitis B     Hypercholesterolemia     Intolerance to statins. Hypertension     Kidney stone     Restless leg syndrome 3/2004    Negative sleep study for sleep apnea. Positive for restless leg syndrome. Rheumatoid arthritis(714.0)      Family History   Adopted: Yes      Past Surgical History:   Procedure Laterality Date    ADENOIDECTOMY      CARDIAC CATHETERIZATION      CARDIAC VALVE SURGERY  06/16/2020    AVR and repair of ascending aortic aneurysm in [place]    CARDIOVASCULAR STRESS TEST  1994    Stress-induced ischemia involving the interventricular septum    CATARACT REMOVAL Bilateral 2002    COLONOSCOPY      CYST REMOVAL      DIAGNOSTIC CARDIAC CATH LAB PROCEDURE      Normal per patient (this was done by Dr. Lili Whittington and records are not available.)    HERNIA REPAIR Left     HERNIA REPAIR      Right luminal hernia repair. SKIN BIOPSY  03/27/2018    Dr Shabbir Hampton cheek-ulcerated basal cell carcinoma    TONSILLECTOMY      VASECTOMY        Vitals:    08/18/22 0806   Temp: 97.9 °F (36.6 °C)   TempSrc: Oral   Weight: 145 lb (65.8 kg)       Objective:    Physical Exam  Vitals reviewed. Constitutional:       Appearance: Normal appearance. He is well-developed. HENT:      Head: Normocephalic.       Right Ear: Tympanic membrane normal.      Left Ear: Tympanic membrane normal.      Nose: Nose normal.      Mouth/Throat:      Mouth: Mucous membranes are moist.   Eyes:      Pupils: Pupils are equal, round, and reactive to light. Cardiovascular:      Rate and Rhythm: Normal rate and regular rhythm. Pulmonary:      Effort: Pulmonary effort is normal.      Breath sounds: Normal breath sounds. Abdominal:      General: Bowel sounds are normal.      Palpations: Abdomen is soft. Musculoskeletal:         General: Normal range of motion. Cervical back: Normal range of motion. Skin:     General: Skin is warm. Neurological:      Mental Status: He is alert and oriented to person, place, and time. Psychiatric:         Behavior: Behavior normal.       Viry Chaves was seen today for follow-up. Diagnoses and all orders for this visit:    Essential hypertension    Mixed hyperlipidemia    Paroxysmal atrial fibrillation ([address]    Other emphysema ([address]    Atherosclerosis of native coronary artery of native heart without angina pectoris      Comments: Low-fat low sugar diet. Check blood pressure twice a day at home. Nebulizer ID. Call if urinary stream decreases. Check pulse ox periodically. Low-fat low sugar diet. Call if any chest pains      I educated the patient about all medications. Make sure they were correct and educated  on the risk associated with missing meds or taking them incorrectly. A list of medications is being sent home with patient today. Check blood pressure at home twice a day. Low-salt low caffeine diet. Call if systolic blood pressure is above 064 and diastolic blood pressures above 85. Only use a upper arm digital cuff. Aggressive low-fat diet. Avoid red meats, greasy fried foods, dairy products. Avoid processed foods. Take cholesterol medications without food. I informed patient about the risk associated with noncompliance of medication and taking medications incorrectly.   Appropriate follow-up with myself and

## 2022-08-19 PROBLEM — J96.00 ACUTE RESPIRATORY FAILURE (HCC): Status: RESOLVED | Noted: 2022-04-23 | Resolved: 2022-08-19

## 2022-08-20 PROCEDURE — 9900000038 HC CARDIAC REHAB PHASE 3 - MONTHLY

## 2022-08-29 ENCOUNTER — HOSPITAL ENCOUNTER (OUTPATIENT)
Dept: CARDIAC REHAB | Age: 72
Discharge: HOME | End: 2022-08-29

## 2022-09-26 ENCOUNTER — HOSPITAL ENCOUNTER (OUTPATIENT)
Dept: CARDIAC REHAB | Age: 72
Discharge: HOME | End: 2022-09-26

## 2022-09-26 PROCEDURE — 9900000038 HC CARDIAC REHAB PHASE 3 - MONTHLY

## 2022-10-20 PROCEDURE — 9900000038 HC CARDIAC REHAB PHASE 3 - MONTHLY

## 2022-10-31 ENCOUNTER — HOSPITAL ENCOUNTER (OUTPATIENT)
Dept: CARDIAC REHAB | Age: 72
Discharge: HOME | End: 2022-10-31

## 2022-11-03 PROCEDURE — 9900000038 HC CARDIAC REHAB PHASE 3 - MONTHLY

## 2022-11-28 ENCOUNTER — HOSPITAL ENCOUNTER (OUTPATIENT)
Dept: CARDIAC REHAB | Age: 72
Discharge: HOME | End: 2022-11-28

## 2022-12-05 PROCEDURE — 9900000038 HC CARDIAC REHAB PHASE 3 - MONTHLY

## 2022-12-19 ENCOUNTER — HOSPITAL ENCOUNTER (OUTPATIENT)
Dept: CARDIAC REHAB | Age: 72
Discharge: HOME | End: 2022-12-19

## 2023-01-16 PROCEDURE — 9900000038 HC CARDIAC REHAB PHASE 3 - MONTHLY

## 2023-01-28 ENCOUNTER — OFFICE VISIT (OUTPATIENT)
Dept: FAMILY MEDICINE CLINIC | Age: 73
End: 2023-01-28

## 2023-01-28 VITALS
WEIGHT: 153 LBS | HEIGHT: 65 IN | TEMPERATURE: 97.5 F | DIASTOLIC BLOOD PRESSURE: 68 MMHG | OXYGEN SATURATION: 97 % | SYSTOLIC BLOOD PRESSURE: 128 MMHG | HEART RATE: 50 BPM | BODY MASS INDEX: 25.49 KG/M2

## 2023-01-28 DIAGNOSIS — J44.1 CHRONIC OBSTRUCTIVE PULMONARY DISEASE WITH ACUTE EXACERBATION (HCC): Primary | ICD-10-CM

## 2023-01-28 DIAGNOSIS — R05.9 COUGH, UNSPECIFIED TYPE: ICD-10-CM

## 2023-01-28 LAB
INFLUENZA A ANTIBODY: NORMAL
INFLUENZA B ANTIBODY: NORMAL
Lab: NORMAL
PERFORMING INSTRUMENT: NORMAL
QC PASS/FAIL: NORMAL
SARS-COV-2, POC: NORMAL

## 2023-01-28 RX ORDER — PREDNISONE 10 MG/1
TABLET ORAL
Qty: 30 TABLET | Refills: 0 | Status: SHIPPED | OUTPATIENT
Start: 2023-01-28 | End: 2023-02-09

## 2023-01-28 RX ORDER — GUAIFENESIN 600 MG/1
600 TABLET, EXTENDED RELEASE ORAL 2 TIMES DAILY
Qty: 30 TABLET | Refills: 0 | Status: SHIPPED | OUTPATIENT
Start: 2023-01-28 | End: 2023-02-12

## 2023-01-28 RX ORDER — DOXYCYCLINE HYCLATE 100 MG
100 TABLET ORAL 2 TIMES DAILY
Qty: 14 TABLET | Refills: 0 | Status: SHIPPED | OUTPATIENT
Start: 2023-01-28 | End: 2023-02-04

## 2023-01-28 ASSESSMENT — ENCOUNTER SYMPTOMS
SORE THROAT: 0
DIARRHEA: 0
RHINORRHEA: 0
NAUSEA: 0
WHEEZING: 0
BACK PAIN: 0
SHORTNESS OF BREATH: 0
COUGH: 1
VOMITING: 0
ABDOMINAL PAIN: 0
SINUS PRESSURE: 0
SINUS PAIN: 0
CONSTIPATION: 0
EYE DISCHARGE: 0

## 2023-01-28 NOTE — PROGRESS NOTES
23  Yara Butt : 1950 Sex: male  Age: 81 y.o. Chief Complaint   Patient presents with    Congestion     States had pneumonia x2 times and covid x2 times in last year    Headache    Cough     Painful productive cough, worse at night, brown sputum. Tried otc meds     HPI:  81 y.o. male presents for acute visit due to URI symptoms. Patient having issues with chest congestion, cough, PND. Bringing up brown sputum. Starting using nebulizer again. Has been 2 weeks. PNA and COVID in . No fevers, SOB.    ROS:  Review of Systems   Constitutional:  Negative for appetite change, chills and fever. HENT:  Positive for congestion and postnasal drip. Negative for ear pain, rhinorrhea, sinus pressure, sinus pain and sore throat. Eyes:  Negative for discharge. Respiratory:  Positive for cough. Negative for shortness of breath and wheezing. Cardiovascular:  Negative for chest pain and palpitations. Gastrointestinal:  Negative for abdominal pain, constipation, diarrhea, nausea and vomiting. Musculoskeletal:  Negative for back pain. Skin:  Negative for rash. Neurological:  Positive for headaches. Negative for dizziness. Hematological:  Negative for adenopathy. All other systems reviewed and are negative.    Current Outpatient Medications on File Prior to Visit   Medication Sig Dispense Refill    Handicap Placard MISC by Does not apply route DURATION:  5 YRS  DX:  O/A, DJD 1 each 0    vitamin B-12 (CYANOCOBALAMIN) 1000 MCG tablet Take 1,000 mcg by mouth daily      albuterol (PROVENTIL) (2.5 MG/3ML) 0.083% nebulizer solution Take 3 mLs by nebulization every 6 hours as needed for Wheezing 360 each 3    Calcium Carbonate-Vit D-Min (CALCIUM 1200 PO) Take by mouth daily       tamsulosin (FLOMAX) 0.4 MG capsule Take 0.4 mg by mouth nightly       calcium carbonate 600 MG TABS tablet Take 1 tablet by mouth daily      mometasone-formoterol (DULERA) 200-5 MCG/ACT inhaler Inhale 2 puffs into the lungs every 12 hours      fluticasone (FLONASE) 50 MCG/ACT nasal spray 1 spray by Each Nostril route daily      Multiple Vitamins-Minerals (PRESERVISION AREDS 2+MULTI VIT PO) Take 1 capsule by mouth daily      aspirin 81 MG chewable tablet Take 81 mg by mouth daily      atorvastatin (LIPITOR) 10 MG tablet Take 10 mg by mouth nightly       No current facility-administered medications on file prior to visit. Allergies   Allergen Reactions    Statins [Statins]      Caused muscle aches and weakness. Past Medical History:   Diagnosis Date    Acute respiratory failure ([address] 4/23/2022    Aneurysm artery, pulmonary (HCC)     Arthritis     rheumatoid    Asthma 1974    status asthmaticus in 3/2004. Cancer ([facility] [address]     basal cell to face    Community acquired pneumonia of left lower lobe of lung     Deviated septum     S/P repair x 3. Hepatitis B     Hypercholesterolemia     Intolerance to statins. Hypertension     Kidney stone     Restless leg syndrome 3/2004    Negative sleep study for sleep apnea. Positive for restless leg syndrome. Rheumatoid arthritis(714.0)      Past Surgical History:   Procedure Laterality Date    ADENOIDECTOMY      CARDIAC CATHETERIZATION      CARDIAC VALVE SURGERY  06/16/2020    AVR and repair of ascending aortic aneurysm in [facility]    CARDIOVASCULAR STRESS TEST  1994    Stress-induced ischemia involving the interventricular septum    CATARACT REMOVAL Bilateral 2002    COLONOSCOPY      CYST REMOVAL      DIAGNOSTIC CARDIAC CATH LAB PROCEDURE      Normal per patient (this was done by Dr. Barry Brewster and records are not available.)    HERNIA REPAIR Left     HERNIA REPAIR      Right luminal hernia repair.     SKIN BIOPSY  03/27/2018    Dr Fabiana Gordillo cheek-ulcerated basal cell carcinoma    TONSILLECTOMY      VASECTOMY       Family History   Adopted: Yes     Social History       Tobacco History       Smoking Status  Former Smoking Start Date  1966 Quit Date  12/11/1975 Smoking Frequency  2.50 packs/day for 2.00 years (5.00 pk-yrs)    Smoking Tobacco Type  Cigarettes from 1966 to 12/11/1975      Smokeless Tobacco Use  Never      Tobacco Comments  Quit in 1975. Vitals:    01/28/23 0819   BP: 128/68   Pulse: 50   Temp: 97.5 °F (36.4 °C)   SpO2: 97%   Weight: 153 lb (69.4 kg)   Height: 5' 5\" (1.651 m)       Physical Exam:  Physical Exam  Vitals and nursing note reviewed. Constitutional:       General: He is not in acute distress. Appearance: Normal appearance. He is well-developed and normal weight. He is not ill-appearing. HENT:      Head: Normocephalic and atraumatic. Right Ear: Hearing, tympanic membrane, ear canal and external ear normal. There is no impacted cerumen. Left Ear: Hearing, tympanic membrane, ear canal and external ear normal. There is no impacted cerumen. Nose: Congestion present. No mucosal edema or rhinorrhea. Right Sinus: No maxillary sinus tenderness or frontal sinus tenderness. Left Sinus: No maxillary sinus tenderness or frontal sinus tenderness. Mouth/Throat:      Mouth: Mucous membranes are moist.      Pharynx: Oropharynx is clear. Posterior oropharyngeal erythema present. No oropharyngeal exudate. Eyes:      General:         Right eye: No discharge. Left eye: No discharge. Extraocular Movements: Extraocular movements intact. Conjunctiva/sclera: Conjunctivae normal.   Cardiovascular:      Rate and Rhythm: Normal rate and regular rhythm. Heart sounds: Normal heart sounds. No murmur heard. Pulmonary:      Effort: Pulmonary effort is normal. No respiratory distress. Breath sounds: Rhonchi (all lung fields, but worse on R; clear with cough) present. No wheezing. Musculoskeletal:         General: Normal range of motion. Cervical back: Normal range of motion and neck supple. No tenderness. Right lower leg: No edema. Left lower leg: No edema.    Lymphadenopathy: Cervical: No cervical adenopathy.   Skin:     General: Skin is warm and dry.      Findings: No rash.   Neurological:      General: No focal deficit present.      Mental Status: He is alert and oriented to person, place, and time.      Gait: Gait normal.   Psychiatric:         Mood and Affect: Mood normal.         Behavior: Behavior normal.         Thought Content: Thought content normal.       Results for orders placed or performed in visit on 01/28/23   POCT Influenza A/B   Result Value Ref Range    Influenza A Ab neg     Influenza B Ab neg    POCT COVID-19, Antigen   Result Value Ref Range    SARS-COV-2, POC Not-Detected Not Detected    Lot Number [id number]     QC Pass/Fail pass     Performing Instrument BD Veritor        Assessment and Plan:  Irving was seen today for congestion, headache and cough.    Diagnoses and all orders for this visit:    Chronic obstructive pulmonary disease with acute exacerbation (HCC)  -     doxycycline hyclate (VIBRA-TABS) 100 MG tablet; Take 1 tablet by mouth 2 times daily for 7 days  -     predniSONE (DELTASONE) 10 MG tablet; Take 4 tablets by mouth daily for 3 days, THEN 3 tablets daily for 3 days, THEN 2 tablets daily for 3 days, THEN 1 tablet daily for 3 days.  -     guaiFENesin (MUCINEX) 600 MG extended release tablet; Take 1 tablet by mouth 2 times daily for 15 days    Cough, unspecified type  -     POCT Influenza A/B  -     POCT COVID-19, Antigen  Flu and COVID negative.  VSS and patient in no acute distress.  He does have ronchi in all lung fields that clear with cough.  Suspect mucus plugging.  No xray available in [facility] on Satur[facility].  Will treat acutely.  Encouraged patient to call in 2-3 days if no signs of improvement.  Will need Xray at that time.     Return if symptoms worsen or fail to improve.      Seen By:  Macie Coombs DO

## 2023-01-30 ENCOUNTER — HOSPITAL ENCOUNTER (OUTPATIENT)
Dept: CARDIAC REHAB | Age: 73
Discharge: HOME | End: 2023-01-30

## 2023-02-20 DIAGNOSIS — E11.9 DIET-CONTROLLED DIABETES MELLITUS (HCC): ICD-10-CM

## 2023-02-20 DIAGNOSIS — I25.10 ATHEROSCLEROSIS OF NATIVE CORONARY ARTERY OF NATIVE HEART WITHOUT ANGINA PECTORIS: Chronic | ICD-10-CM

## 2023-02-20 DIAGNOSIS — E53.8 VITAMIN B 12 DEFICIENCY: ICD-10-CM

## 2023-02-20 DIAGNOSIS — M81.0 AGE-RELATED OSTEOPOROSIS WITHOUT CURRENT PATHOLOGICAL FRACTURE: ICD-10-CM

## 2023-02-20 DIAGNOSIS — I10 ESSENTIAL HYPERTENSION: Chronic | ICD-10-CM

## 2023-02-20 DIAGNOSIS — E78.2 MIXED HYPERLIPIDEMIA: Chronic | ICD-10-CM

## 2023-02-20 LAB
ALBUMIN SERPL-MCNC: 4.1 G/DL (ref 3.5–5.2)
ALP BLD-CCNC: 51 U/L (ref 40–129)
ALT SERPL-CCNC: 21 U/L (ref 0–40)
ANION GAP SERPL CALCULATED.3IONS-SCNC: 11 MMOL/L (ref 7–16)
AST SERPL-CCNC: 30 U/L (ref 0–39)
BACTERIA: NORMAL /HPF
BASOPHILS ABSOLUTE: 0.07 E9/L (ref 0–0.2)
BASOPHILS RELATIVE PERCENT: 1 % (ref 0–2)
BILIRUB SERPL-MCNC: 0.8 MG/DL (ref 0–1.2)
BILIRUBIN URINE: NEGATIVE
BLOOD, URINE: NEGATIVE
BUN BLDV-MCNC: 18 MG/DL (ref 6–23)
CALCIUM SERPL-MCNC: 9.3 MG/DL (ref 8.6–10.2)
CHLORIDE BLD-SCNC: 106 MMOL/L (ref 98–107)
CHOLESTEROL, TOTAL: 127 MG/DL (ref 0–199)
CLARITY: CLEAR
CO2: 25 MMOL/L (ref 22–29)
COLOR: YELLOW
CREAT SERPL-MCNC: 0.9 MG/DL (ref 0.7–1.2)
EOSINOPHILS ABSOLUTE: 0.53 E9/L (ref 0.05–0.5)
EOSINOPHILS RELATIVE PERCENT: 7.3 % (ref 0–6)
EPITHELIAL CELLS, UA: NORMAL /HPF
GFR SERPL CREATININE-BSD FRML MDRD: >60 ML/MIN/1.73
GLUCOSE BLD-MCNC: 89 MG/DL (ref 74–99)
GLUCOSE URINE: NEGATIVE MG/DL
HBA1C MFR BLD: 5.8 % (ref 4–5.6)
HCT VFR BLD CALC: 40.4 % (ref 37–54)
HDLC SERPL-MCNC: 51 MG/DL
HEMOGLOBIN: 13.3 G/DL (ref 12.5–16.5)
IMMATURE GRANULOCYTES #: 0.04 E9/L
IMMATURE GRANULOCYTES %: 0.5 % (ref 0–5)
KETONES, URINE: NEGATIVE MG/DL
LDL CHOLESTEROL CALCULATED: 67 MG/DL (ref 0–99)
LEUKOCYTE ESTERASE, URINE: ABNORMAL
LYMPHOCYTES ABSOLUTE: 1.89 E9/L (ref 1.5–4)
LYMPHOCYTES RELATIVE PERCENT: 25.9 % (ref 20–42)
MCH RBC QN AUTO: 32.7 PG (ref 26–35)
MCHC RBC AUTO-ENTMCNC: 32.9 % (ref 32–34.5)
MCV RBC AUTO: 99.3 FL (ref 80–99.9)
MONOCYTES ABSOLUTE: 0.58 E9/L (ref 0.1–0.95)
MONOCYTES RELATIVE PERCENT: 8 % (ref 2–12)
NEUTROPHILS ABSOLUTE: 4.18 E9/L (ref 1.8–7.3)
NEUTROPHILS RELATIVE PERCENT: 57.3 % (ref 43–80)
NITRITE, URINE: NEGATIVE
PDW BLD-RTO: 12.6 FL (ref 11.5–15)
PH UA: 6 (ref 5–9)
PLATELET # BLD: 154 E9/L (ref 130–450)
PMV BLD AUTO: 10.3 FL (ref 7–12)
POTASSIUM SERPL-SCNC: 4.5 MMOL/L (ref 3.5–5)
PROTEIN UA: NEGATIVE MG/DL
RBC # BLD: 4.07 E12/L (ref 3.8–5.8)
RBC UA: NORMAL /HPF (ref 0–2)
SODIUM BLD-SCNC: 142 MMOL/L (ref 132–146)
SPECIFIC GRAVITY UA: 1.02 (ref 1–1.03)
TOTAL PROTEIN: 6.4 G/DL (ref 6.4–8.3)
TRIGL SERPL-MCNC: 45 MG/DL (ref 0–149)
UROBILINOGEN, URINE: 0.2 E.U./DL
VITAMIN B-12: 868 PG/ML (ref 211–946)
VITAMIN D 25-HYDROXY: 34 NG/ML (ref 30–100)
VLDLC SERPL CALC-MCNC: 9 MG/DL
WBC # BLD: 7.3 E9/L (ref 4.5–11.5)
WBC UA: NORMAL /HPF (ref 0–5)

## 2023-02-21 ENCOUNTER — OFFICE VISIT (OUTPATIENT)
Dept: PRIMARY CARE CLINIC | Age: 73
End: 2023-02-21
Payer: MEDICARE

## 2023-02-21 VITALS
BODY MASS INDEX: 25.33 KG/M2 | HEIGHT: 65 IN | TEMPERATURE: 97.8 F | WEIGHT: 152 LBS | DIASTOLIC BLOOD PRESSURE: 70 MMHG | SYSTOLIC BLOOD PRESSURE: 126 MMHG

## 2023-02-21 DIAGNOSIS — B18.1 CHRONIC HEPATITIS B (HCC): ICD-10-CM

## 2023-02-21 DIAGNOSIS — I48.0 PAROXYSMAL ATRIAL FIBRILLATION (HCC): ICD-10-CM

## 2023-02-21 DIAGNOSIS — I25.10 ATHEROSCLEROSIS OF NATIVE CORONARY ARTERY OF NATIVE HEART WITHOUT ANGINA PECTORIS: Chronic | ICD-10-CM

## 2023-02-21 DIAGNOSIS — I10 ESSENTIAL HYPERTENSION: Primary | Chronic | ICD-10-CM

## 2023-02-21 DIAGNOSIS — Z12.5 PROSTATE CANCER SCREENING: ICD-10-CM

## 2023-02-21 DIAGNOSIS — D69.6 THROMBOCYTOPENIA, UNSPECIFIED (HCC): ICD-10-CM

## 2023-02-21 DIAGNOSIS — M81.0 AGE-RELATED OSTEOPOROSIS WITHOUT CURRENT PATHOLOGICAL FRACTURE: ICD-10-CM

## 2023-02-21 DIAGNOSIS — M05.79 RHEUMATOID ARTHRITIS INVOLVING MULTIPLE SITES WITH POSITIVE RHEUMATOID FACTOR (HCC): ICD-10-CM

## 2023-02-21 DIAGNOSIS — E11.9 DIET-CONTROLLED DIABETES MELLITUS (HCC): ICD-10-CM

## 2023-02-21 PROCEDURE — 3078F DIAST BP <80 MM HG: CPT | Performed by: FAMILY MEDICINE

## 2023-02-21 PROCEDURE — 9900000038 HC CARDIAC REHAB PHASE 3 - MONTHLY

## 2023-02-21 PROCEDURE — 99214 OFFICE O/P EST MOD 30 MIN: CPT | Performed by: FAMILY MEDICINE

## 2023-02-21 PROCEDURE — 2022F DILAT RTA XM EVC RTNOPTHY: CPT | Performed by: FAMILY MEDICINE

## 2023-02-21 PROCEDURE — G8417 CALC BMI ABV UP PARAM F/U: HCPCS | Performed by: FAMILY MEDICINE

## 2023-02-21 PROCEDURE — G8484 FLU IMMUNIZE NO ADMIN: HCPCS | Performed by: FAMILY MEDICINE

## 2023-02-21 PROCEDURE — 1036F TOBACCO NON-USER: CPT | Performed by: FAMILY MEDICINE

## 2023-02-21 PROCEDURE — 3074F SYST BP LT 130 MM HG: CPT | Performed by: FAMILY MEDICINE

## 2023-02-21 PROCEDURE — 3017F COLORECTAL CA SCREEN DOC REV: CPT | Performed by: FAMILY MEDICINE

## 2023-02-21 PROCEDURE — 1123F ACP DISCUSS/DSCN MKR DOCD: CPT | Performed by: FAMILY MEDICINE

## 2023-02-21 PROCEDURE — 3044F HG A1C LEVEL LT 7.0%: CPT | Performed by: FAMILY MEDICINE

## 2023-02-21 PROCEDURE — G8428 CUR MEDS NOT DOCUMENT: HCPCS | Performed by: FAMILY MEDICINE

## 2023-02-21 ASSESSMENT — PATIENT HEALTH QUESTIONNAIRE - PHQ9
SUM OF ALL RESPONSES TO PHQ9 QUESTIONS 1 & 2: 0
SUM OF ALL RESPONSES TO PHQ QUESTIONS 1-9: 0
SUM OF ALL RESPONSES TO PHQ QUESTIONS 1-9: 0
1. LITTLE INTEREST OR PLEASURE IN DOING THINGS: 0
2. FEELING DOWN, DEPRESSED OR HOPELESS: 0
SUM OF ALL RESPONSES TO PHQ QUESTIONS 1-9: 0
SUM OF ALL RESPONSES TO PHQ QUESTIONS 1-9: 0

## 2023-02-21 ASSESSMENT — ENCOUNTER SYMPTOMS
ALLERGIC/IMMUNOLOGIC NEGATIVE: 1
EYES NEGATIVE: 1
GASTROINTESTINAL NEGATIVE: 1
RESPIRATORY NEGATIVE: 1

## 2023-02-21 NOTE — PROGRESS NOTES
23  Name: Shantal Olvera    : 1950    Sex: male    Age: 81 y.o. Subjective:  Chief Complaint: Patient is here for 6 mo ck  re    bilat  bp    valvve   chol AF    RA    cad   degen  joit dis   bph     Feel ok      in cardiac  rehabl  pays     Gets spells of light headed and  sweat      and  nausea and passes         could ocur in bed or walking     rare event     Sees urol  and  for cyto and poss urolift  Ghb up       admits snack q hs  to dec      Review of Systems   Constitutional: Negative. HENT: Negative. Eyes: Negative. Respiratory: Negative. Cardiovascular: Negative. Gastrointestinal: Negative. Endocrine: Negative. Genitourinary: Negative. Musculoskeletal: Negative. Skin: Negative. Allergic/Immunologic: Negative. Neurological: Negative. Hematological: Negative. Psychiatric/Behavioral: Negative.          Current Outpatient Medications:     vitamin B-12 (CYANOCOBALAMIN) 1000 MCG tablet, Take 1,000 mcg by mouth daily, Disp: , Rfl:     albuterol (PROVENTIL) (2.5 MG/3ML) 0.083% nebulizer solution, Take 3 mLs by nebulization every 6 hours as needed for Wheezing, Disp: 360 each, Rfl: 3    Calcium Carbonate-Vit D-Min (CALCIUM 1200 PO), Take by mouth daily , Disp: , Rfl:     tamsulosin (FLOMAX) 0.4 MG capsule, Take 0.4 mg by mouth nightly , Disp: , Rfl:     calcium carbonate 600 MG TABS tablet, Take 1 tablet by mouth daily, Disp: , Rfl:     mometasone-formoterol (DULERA) 200-5 MCG/ACT inhaler, Inhale 2 puffs into the lungs every 12 hours, Disp: , Rfl:     fluticasone (FLONASE) 50 MCG/ACT nasal spray, 1 spray by Each Nostril route daily, Disp: , Rfl:     Multiple Vitamins-Minerals (PRESERVISION AREDS 2+MULTI VIT PO), Take 1 capsule by mouth daily, Disp: , Rfl:     aspirin 81 MG chewable tablet, Take 81 mg by mouth daily, Disp: , Rfl:     atorvastatin (LIPITOR) 10 MG tablet, Take 10 mg by mouth nightly, Disp: , Rfl:   Allergies   Allergen Reactions    Statins [Statins]      Caused muscle aches and weakness. Social History     Socioeconomic History    Marital status:      Spouse name: Not on file    Number of children: Not on file    Years of education: Not on file    Highest education level: Not on file   Occupational History    Not on file   Tobacco Use    Smoking status: Former     Packs/day: 2.50     Years: 2.00     Pack years: 5.00     Types: Cigarettes     Start date: 1     Quit date: 1975     Years since quittin.2    Smokeless tobacco: Never    Tobacco comments:     Quit in . 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   Was on prednisone for 40 years has been off for 7 years    Macular degeneration with loss of vision left eye-----sees   eye care    Coronary artery disease sees Dr. Maggi Armstrong at [facility]    Aortic valve surgery with repair of PFO 2020 [facility]    Hepatitis B from his work sees gastroenterology [facility]    Kidney stones    Restless leg syndrome    Rheumatoid arthritis [facility]    Ultrasound 2020 on clinical clinic mentions gallbladder sludge which she is unaware    BPH sees Dr. Shae Hi sees Dr. Cele Larsen    S/p multiple hernias    Colonoscopy will be due soon with Dr. Anahi Jarvis    Paroxysmal atrial fibrillation    Mitral regurgitation    Admit    with pneumonia  and resp distress    Brought me bottle of home made [facility] 5-22    Er with left upper lobe pneumonia----6-22    Bph    cysto    2-23 and      Cardiac  rehab  pays   2-23oss urolift    pre DM    2-23     Social Determinants of Health     Financial Resource Strain: Not on file   Food Insecurity: Not on file   Transportation Needs: Not on file   Physical Activity: Inactive    Days of Exercise per Week: 0 days    Minutes of Exercise per Session: 0 min   Stress: Not on file   Social Connections: Not on file   Intimate Partner Violence: Not on file   Housing Stability: Not on file      Past Medical History:   Diagnosis Date    Acute respiratory failure ([address] 4/23/2022    Aneurysm artery, pulmonary (HCC)     Arthritis     rheumatoid    Asthma 1974    status asthmaticus in 3/2004. Cancer ([facility] [address]     basal cell to face    Community acquired pneumonia of left lower lobe of lung     Deviated septum     S/P repair x 3. Hepatitis B     Hypercholesterolemia     Intolerance to statins. Hypertension     Kidney stone     Restless leg syndrome 3/2004    Negative sleep study for sleep apnea. Positive for restless leg syndrome. Rheumatoid arthritis(714.0)      Family History   Adopted: Yes      Past Surgical History:   Procedure Laterality Date    ADENOIDECTOMY      CARDIAC CATHETERIZATION      CARDIAC VALVE SURGERY  06/16/2020    AVR and repair of ascending aortic aneurysm in [facility]    CARDIOVASCULAR STRESS TEST  1994    Stress-induced ischemia involving the interventricular septum    CATARACT REMOVAL Bilateral 2002    COLONOSCOPY      CYST REMOVAL      DIAGNOSTIC CARDIAC CATH LAB PROCEDURE      Normal per patient (this was done by Dr. Ross Higgins and records are not available.)    HERNIA REPAIR Left     HERNIA REPAIR      Right luminal hernia repair.     SKIN BIOPSY  03/27/2018    Dr Chad Jose cheek-ulcerated basal cell carcinoma    TONSILLECTOMY      VASECTOMY        Vitals:    02/21/23 0727   BP: 126/70   Temp: 97.8 °F (36.6 °C)   TempSrc: Oral   Weight: 152 lb (68.9 kg) Height: 5' 5\" (1.651 m)       Objective:    Physical Exam  Vitals reviewed. Constitutional:       Appearance: Normal appearance. He is well-developed. HENT:      Head: Normocephalic. Right Ear: Tympanic membrane normal.      Left Ear: Tympanic membrane normal.      Nose: Nose normal.      Mouth/Throat:      Mouth: Mucous membranes are moist.   Eyes:      Pupils: Pupils are equal, round, and reactive to light. Cardiovascular:      Rate and Rhythm: Normal rate and regular rhythm. Pulmonary:      Effort: Pulmonary effort is normal.      Breath sounds: Normal breath sounds. Abdominal:      General: Bowel sounds are normal.      Palpations: Abdomen is soft. Musculoskeletal:         General: Normal range of motion. Cervical back: Normal range of motion. Skin:     General: Skin is warm. Neurological:      Mental Status: He is alert and oriented to person, place, and time. Psychiatric:         Behavior: Behavior normal.       Vikas Alvarenga was seen today for discuss labs. Diagnoses and all orders for this visit:    Essential hypertension    Diet-controlled diabetes mellitus ([facility] [address]    Rheumatoid arthritis involving multiple sites with positive rheumatoid factor (HCC)    Paroxysmal atrial fibrillation (HCC)    Thrombocytopenia, unspecified (HCC)    Chronic hepatitis B ([address]    Atherosclerosis of native coronary artery of native heart without angina pectoris      Comments: low sugar  diet  exer hm isues   lwo  carb fuwith pulm cardiao    I educated the patient about all medications. Make sure they were correct and educated  on the risk associated with missing meds or taking them incorrectly. A list of medications is being sent home with patient today. Check blood pressure at home twice a day. Low-salt low caffeine diet. Call if systolic blood pressure is above 608 and diastolic blood pressures above 85. Only use a upper arm digital cuff. Aggressive low-fat diet.   Avoid red meats, greasy fried foods, dairy products. Avoid processed foods. Take cholesterol medications without food. A great deal of time spent reviewing medications, diet, exercise, social issues. Also reviewing the chart before entering the room with patient and finishing charting after leaving patient's room. More than half of that time was spent face to face with the patient in counseling and coordinating care. I informed patient about the risk associated with noncompliance of medication and taking medications incorrectly. Appropriate follow-up with myself and all specialist.  Encourage family members to take active role in assisting with medications and medical care. If any confusion should develop to notify my office immediately to avoid risk of worsening medical condition      Follow Up: Return in about 6 months (around 8/21/2023) for Lab Before.      Seen by:  Audrey Mclain,

## 2023-02-27 ENCOUNTER — HOSPITAL ENCOUNTER (OUTPATIENT)
Dept: CARDIAC REHAB | Age: 73
Discharge: HOME | End: 2023-02-27

## 2023-03-09 PROCEDURE — 9900000038 HC CARDIAC REHAB PHASE 3 - MONTHLY

## 2023-03-27 ENCOUNTER — HOSPITAL ENCOUNTER (OUTPATIENT)
Dept: CARDIAC REHAB | Age: 73
Discharge: HOME | End: 2023-03-27

## 2023-04-06 PROCEDURE — 9900000038 HC CARDIAC REHAB PHASE 3 - MONTHLY

## 2023-04-24 ENCOUNTER — HOSPITAL ENCOUNTER (OUTPATIENT)
Dept: CARDIAC REHAB | Age: 73
Discharge: HOME | End: 2023-04-24

## 2023-05-03 ENCOUNTER — APPOINTMENT (OUTPATIENT)
Dept: ULTRASOUND IMAGING | Age: 73
End: 2023-05-03
Payer: MEDICARE

## 2023-05-03 ENCOUNTER — APPOINTMENT (OUTPATIENT)
Dept: CT IMAGING | Age: 73
End: 2023-05-03
Payer: MEDICARE

## 2023-05-03 ENCOUNTER — HOSPITAL ENCOUNTER (EMERGENCY)
Age: 73
Discharge: HOME | End: 2023-05-03
Attending: EMERGENCY MEDICINE
Payer: MEDICARE

## 2023-05-03 ENCOUNTER — OFFICE VISIT (OUTPATIENT)
Dept: FAMILY MEDICINE CLINIC | Age: 73
End: 2023-05-03

## 2023-05-03 VITALS
WEIGHT: 147 LBS | TEMPERATURE: 98.2 F | HEART RATE: 68 BPM | BODY MASS INDEX: 24.49 KG/M2 | OXYGEN SATURATION: 97 % | DIASTOLIC BLOOD PRESSURE: 75 MMHG | RESPIRATION RATE: 18 BRPM | SYSTOLIC BLOOD PRESSURE: 152 MMHG | HEIGHT: 65 IN

## 2023-05-03 VITALS
DIASTOLIC BLOOD PRESSURE: 76 MMHG | SYSTOLIC BLOOD PRESSURE: 124 MMHG | OXYGEN SATURATION: 97 % | WEIGHT: 147.2 LBS | BODY MASS INDEX: 24.5 KG/M2 | HEART RATE: 50 BPM | TEMPERATURE: 98 F

## 2023-05-03 DIAGNOSIS — R00.1 BRADYCARDIA: ICD-10-CM

## 2023-05-03 DIAGNOSIS — R55 NEAR SYNCOPE: Primary | ICD-10-CM

## 2023-05-03 DIAGNOSIS — K40.90 LEFT INGUINAL HERNIA: ICD-10-CM

## 2023-05-03 DIAGNOSIS — R42 LIGHTHEADEDNESS: ICD-10-CM

## 2023-05-03 DIAGNOSIS — M54.9 MID BACK PAIN: ICD-10-CM

## 2023-05-03 DIAGNOSIS — R11.2 NAUSEA AND VOMITING, UNSPECIFIED VOMITING TYPE: ICD-10-CM

## 2023-05-03 DIAGNOSIS — N28.1 RENAL CYST: Primary | ICD-10-CM

## 2023-05-03 DIAGNOSIS — R10.13 EPIGASTRIC PAIN: ICD-10-CM

## 2023-05-03 DIAGNOSIS — R10.9 ABDOMINAL PAIN, UNSPECIFIED ABDOMINAL LOCATION: ICD-10-CM

## 2023-05-03 DIAGNOSIS — K80.20 GALLSTONES: ICD-10-CM

## 2023-05-03 DIAGNOSIS — K76.89 LIVER CYST: ICD-10-CM

## 2023-05-03 LAB
ALBUMIN SERPL-MCNC: 4.7 G/DL (ref 3.5–5.2)
ALP SERPL-CCNC: 51 U/L (ref 40–129)
ALT SERPL-CCNC: 24 U/L (ref 0–40)
ANION GAP SERPL CALCULATED.3IONS-SCNC: 11 MMOL/L (ref 7–16)
APAP SERPL-MCNC: 9 MCG/ML (ref 10–30)
APAP SERPL-MCNC: <5 MCG/ML (ref 10–30)
AST SERPL-CCNC: 30 U/L (ref 0–39)
BACTERIA URNS QL MICRO: ABNORMAL /HPF
BASOPHILS # BLD: 0.05 E9/L (ref 0–0.2)
BASOPHILS NFR BLD: 0.7 % (ref 0–2)
BILIRUB DIRECT SERPL-MCNC: 0.3 MG/DL (ref 0–0.3)
BILIRUB INDIRECT SERPL-MCNC: 2.4 MG/DL (ref 0–1)
BILIRUB SERPL-MCNC: 2.7 MG/DL (ref 0–1.2)
BILIRUB UR QL STRIP: NEGATIVE
BUN SERPL-MCNC: 17 MG/DL (ref 6–23)
CALCIUM SERPL-MCNC: 10.2 MG/DL (ref 8.6–10.2)
CHLORIDE SERPL-SCNC: 105 MMOL/L (ref 98–107)
CLARITY UR: CLEAR
CO2 SERPL-SCNC: 23 MMOL/L (ref 22–29)
COLOR UR: YELLOW
CREAT SERPL-MCNC: 1 MG/DL (ref 0.7–1.2)
D DIMER: 972 NG/ML DDU
EKG ATRIAL RATE: 45 BPM
EKG P AXIS: 30 DEGREES
EKG P-R INTERVAL: 188 MS
EKG Q-T INTERVAL: 446 MS
EKG QRS DURATION: 112 MS
EKG QTC CALCULATION (BAZETT): 385 MS
EKG R AXIS: -11 DEGREES
EKG T AXIS: 67 DEGREES
EKG VENTRICULAR RATE: 45 BPM
EOSINOPHIL # BLD: 0.32 E9/L (ref 0.05–0.5)
EOSINOPHIL NFR BLD: 4.5 % (ref 0–6)
ERYTHROCYTE [DISTWIDTH] IN BLOOD BY AUTOMATED COUNT: 13.2 FL (ref 11.5–15)
ETHANOLAMINE SERPL-MCNC: <10 MG/DL (ref 0–0.08)
GLUCOSE SERPL-MCNC: 93 MG/DL (ref 74–99)
GLUCOSE UR STRIP-MCNC: NEGATIVE MG/DL
HCT VFR BLD AUTO: 41 % (ref 37–54)
HGB BLD-MCNC: 13.8 G/DL (ref 12.5–16.5)
HGB UR QL STRIP: NEGATIVE
IMM GRANULOCYTES # BLD: 0.03 E9/L
IMM GRANULOCYTES NFR BLD: 0.4 % (ref 0–5)
INR BLD: 1.1
KETONES UR STRIP-MCNC: NEGATIVE MG/DL
LACTATE BLDV-SCNC: 1 MMOL/L (ref 0.5–2.2)
LEUKOCYTE ESTERASE UR QL STRIP: ABNORMAL
LIPASE: 45 U/L (ref 13–60)
LYMPHOCYTES # BLD: 1.52 E9/L (ref 1.5–4)
LYMPHOCYTES NFR BLD: 21.6 % (ref 20–42)
MCH RBC QN AUTO: 32.9 PG (ref 26–35)
MCHC RBC AUTO-ENTMCNC: 33.7 % (ref 32–34.5)
MCV RBC AUTO: 97.9 FL (ref 80–99.9)
MONOCYTES # BLD: 0.55 E9/L (ref 0.1–0.95)
MONOCYTES NFR BLD: 7.8 % (ref 2–12)
MUCOUS THREADS URNS QL MICRO: PRESENT /LPF
NEUTROPHILS # BLD: 4.57 E9/L (ref 1.8–7.3)
NEUTS SEG NFR BLD: 65 % (ref 43–80)
NITRITE UR QL STRIP: NEGATIVE
PH UR STRIP: 5.5 [PH] (ref 5–9)
PLATELET # BLD AUTO: 147 E9/L (ref 130–450)
PMV BLD AUTO: 10.3 FL (ref 7–12)
POTASSIUM SERPL-SCNC: 3.9 MMOL/L (ref 3.5–5)
PROT SERPL-MCNC: 6.8 G/DL (ref 6.4–8.3)
PROT UR STRIP-MCNC: NEGATIVE MG/DL
PROTHROMBIN TIME: 11.7 SEC (ref 9.3–12.4)
RBC # BLD AUTO: 4.19 E12/L (ref 3.8–5.8)
RBC #/AREA URNS HPF: ABNORMAL /HPF (ref 0–2)
SALICYLATES SERPL-MCNC: <0.3 MG/DL (ref 0–30)
SODIUM SERPL-SCNC: 139 MMOL/L (ref 132–146)
SP GR UR STRIP: 1.02 (ref 1–1.03)
TROPONIN, HIGH SENSITIVITY: 25 NG/L (ref 0–11)
TROPONIN, HIGH SENSITIVITY: 28 NG/L (ref 0–11)
UROBILINOGEN UR STRIP-ACNC: 0.2 E.U./DL
WBC # BLD: 7 E9/L (ref 4.5–11.5)
WBC #/AREA URNS HPF: ABNORMAL /HPF (ref 0–5)

## 2023-05-03 PROCEDURE — 80143 DRUG ASSAY ACETAMINOPHEN: CPT

## 2023-05-03 PROCEDURE — 85610 PROTHROMBIN TIME: CPT

## 2023-05-03 PROCEDURE — 93010 ELECTROCARDIOGRAM REPORT: CPT | Performed by: INTERNAL MEDICINE

## 2023-05-03 PROCEDURE — 85025 COMPLETE CBC W/AUTO DIFF WBC: CPT

## 2023-05-03 PROCEDURE — 99285 EMERGENCY DEPT VISIT HI MDM: CPT

## 2023-05-03 PROCEDURE — 2580000003 HC RX 258: Performed by: STUDENT IN AN ORGANIZED HEALTH CARE EDUCATION/TRAINING PROGRAM

## 2023-05-03 PROCEDURE — 71275 CT ANGIOGRAPHY CHEST: CPT

## 2023-05-03 PROCEDURE — 74177 CT ABD & PELVIS W/CONTRAST: CPT

## 2023-05-03 PROCEDURE — 84484 ASSAY OF TROPONIN QUANT: CPT

## 2023-05-03 PROCEDURE — 81001 URINALYSIS AUTO W/SCOPE: CPT

## 2023-05-03 PROCEDURE — 85378 FIBRIN DEGRADE SEMIQUANT: CPT

## 2023-05-03 PROCEDURE — 76705 ECHO EXAM OF ABDOMEN: CPT

## 2023-05-03 PROCEDURE — 6360000004 HC RX CONTRAST MEDICATION: Performed by: RADIOLOGY

## 2023-05-03 PROCEDURE — 83605 ASSAY OF LACTIC ACID: CPT

## 2023-05-03 PROCEDURE — 70450 CT HEAD/BRAIN W/O DYE: CPT

## 2023-05-03 PROCEDURE — 36415 COLL VENOUS BLD VENIPUNCTURE: CPT

## 2023-05-03 PROCEDURE — 83690 ASSAY OF LIPASE: CPT

## 2023-05-03 PROCEDURE — 80048 BASIC METABOLIC PNL TOTAL CA: CPT

## 2023-05-03 PROCEDURE — 80076 HEPATIC FUNCTION PANEL: CPT

## 2023-05-03 PROCEDURE — 93005 ELECTROCARDIOGRAM TRACING: CPT | Performed by: STUDENT IN AN ORGANIZED HEALTH CARE EDUCATION/TRAINING PROGRAM

## 2023-05-03 RX ORDER — 0.9 % SODIUM CHLORIDE 0.9 %
1000 INTRAVENOUS SOLUTION INTRAVENOUS ONCE
Status: COMPLETED | OUTPATIENT
Start: 2023-05-03 | End: 2023-05-03

## 2023-05-03 RX ADMIN — SODIUM CHLORIDE 1000 ML: 9 INJECTION, SOLUTION INTRAVENOUS at 09:35

## 2023-05-03 RX ADMIN — IOPAMIDOL 75 ML: 755 INJECTION, SOLUTION INTRAVENOUS at 11:15

## 2023-05-03 NOTE — ED NOTES
RN ambulated patient. Patient had no complications with ambulation. Patient had a steady gait with no complaints.      Pennie Dorman RN  05/03/23 0802

## 2023-05-03 NOTE — ED PROVIDER NOTES
without inflammatory process associated      No obstructing uropathy or mechanical obstructive process of bowel with   bilateral renal cortical cystic lesions multiple simple appearing cystic   lesions with a minimally complex left renal cortical cysts measuring 3.5 cm   with thin partially calcified septations warrants further follow-up on   nonemergent and outpatient ultrasound renal evaluation. CT ABDOMEN PELVIS W IV CONTRAST Additional Contrast? None   Final Result   Please see separate report CT a chest and CT abdomen combined         US GALLBLADDER RUQ   Final Result   Cholelithiasis without gallbladder wall thickening or pericholecystic fluid   no biliary dilatation      Hepatic cyst minimally septated 1 cm without internal flow of suspicious   features      Simple appearing right renal cortical cysts 1.9 cm and 3.6 cm respectively   without complex features or internal flow Bosniak 1 criteria without required   follow-up of these areas             ------------------------- NURSING NOTES AND VITALS REVIEWED ---------------------------  Date / Time Roomed:  5/3/2023  9:06 AM  ED Bed Assignment:  27/27    The nursing notes within the ED encounter and vital signs as below have been reviewed. BP (!) 152/75   Pulse 68   Temp 98.2 °F (36.8 °C) (Oral)   Resp 18   Ht 5' 5\" (1.651 m)   Wt 147 lb (66.7 kg)   SpO2 97%   BMI 24.46 kg/m²   Oxygen Saturation Interpretation: Normal    Medical Decision Making  53-year-old male with history of paroxysmal A-fib, chronic hepatitis B presenting near syncope, abdominal pain, emesis. No acute changes on EKG and troponin reassuring, doubt ACS at this time. D-dimer was elevated, so CTA was obtained, but did not show PE.  CT head negative for any acute process. CT abdomen and pelvis negative for any acute process, but did show incidental findings, which patient was informed of.   Serum drug screen showed serum acetaminophen level of 9 and patient did admit to

## 2023-05-03 NOTE — PROGRESS NOTES
5/3/23  Robbie Butt : 1950 Sex: male  Age 81 y.o. Subjective:  Chief Complaint   Patient presents with    Dizziness     Started yesterday    Nausea    Emesis    Abdominal Pain         HPI:   Macario Saenz , 81 y.o. male presents to express care for evaluation of dizziness, nausea, vomiting, abdominal pain, back pain    HPI  74-year-old male presents to express care for evaluation of epigastric pain that radiates around to his back, nausea, vomiting, lightheadedness, dizziness. The patient started with the symptoms yesterday. The patient was finishing up with his job and started to feel lightheaded. The patient felt as though he was going to pass out. The patient states that he had a hard time getting home and ended up having an episode of emesis. Still having some nausea and epigastric discomfort that now radiates to the back. The patient has had 4 different hernia surgeries. The patient states that he had open heart surgery before. The patient does have a history of CAD. The patient does have history of GERD. No previous cholecystectomy, appendectomy. The patient does have a history of ascending aorta dilation and ascending aortic aneurysm. ROS:   Unless otherwise stated in this report the patient's positive and negative responses for review of systems for constitutional, eyes, ENT, cardiovascular, respiratory, gastrointestinal, neurological, , musculoskeletal, and integument systems and related systems to the presenting problem are either stated in the history of present illness or were not pertinent or were negative for the symptoms and/or complaints related to the presenting medical problem. Positives and pertinent negatives as per HPI. All others reviewed and are negative.       PMH:     Past Medical History:   Diagnosis Date    Acute respiratory failure ([address] 2022    Aneurysm artery, pulmonary (HCC)     Arthritis     rheumatoid    Asthma     status asthmaticus in

## 2023-05-04 PROCEDURE — 9900000038 HC CARDIAC REHAB PHASE 3 - MONTHLY

## 2023-05-04 ASSESSMENT — ENCOUNTER SYMPTOMS
SORE THROAT: 0
SHORTNESS OF BREATH: 0
NAUSEA: 1
VOMITING: 1
BACK PAIN: 0
PHOTOPHOBIA: 0
DIARRHEA: 0
ABDOMINAL PAIN: 1
COUGH: 0

## 2023-05-08 ENCOUNTER — OFFICE VISIT (OUTPATIENT)
Dept: PRIMARY CARE CLINIC | Age: 73
End: 2023-05-08
Payer: MEDICARE

## 2023-05-08 VITALS
TEMPERATURE: 98.3 F | WEIGHT: 148 LBS | DIASTOLIC BLOOD PRESSURE: 72 MMHG | BODY MASS INDEX: 24.66 KG/M2 | SYSTOLIC BLOOD PRESSURE: 132 MMHG | HEIGHT: 65 IN

## 2023-05-08 DIAGNOSIS — I25.10 ATHEROSCLEROSIS OF NATIVE CORONARY ARTERY OF NATIVE HEART WITHOUT ANGINA PECTORIS: Chronic | ICD-10-CM

## 2023-05-08 DIAGNOSIS — I71.23 ANEURYSM OF DESCENDING THORACIC AORTA WITHOUT RUPTURE (HCC): ICD-10-CM

## 2023-05-08 DIAGNOSIS — I10 ESSENTIAL HYPERTENSION: Chronic | ICD-10-CM

## 2023-05-08 DIAGNOSIS — E11.9 DIET-CONTROLLED DIABETES MELLITUS (HCC): ICD-10-CM

## 2023-05-08 DIAGNOSIS — K80.20 CALCULUS OF GALLBLADDER WITHOUT CHOLECYSTITIS WITHOUT OBSTRUCTION: Primary | ICD-10-CM

## 2023-05-08 PROCEDURE — 1036F TOBACCO NON-USER: CPT | Performed by: FAMILY MEDICINE

## 2023-05-08 PROCEDURE — 3044F HG A1C LEVEL LT 7.0%: CPT | Performed by: FAMILY MEDICINE

## 2023-05-08 PROCEDURE — 3075F SYST BP GE 130 - 139MM HG: CPT | Performed by: FAMILY MEDICINE

## 2023-05-08 PROCEDURE — 1123F ACP DISCUSS/DSCN MKR DOCD: CPT | Performed by: FAMILY MEDICINE

## 2023-05-08 PROCEDURE — 3017F COLORECTAL CA SCREEN DOC REV: CPT | Performed by: FAMILY MEDICINE

## 2023-05-08 PROCEDURE — G8428 CUR MEDS NOT DOCUMENT: HCPCS | Performed by: FAMILY MEDICINE

## 2023-05-08 PROCEDURE — 99214 OFFICE O/P EST MOD 30 MIN: CPT | Performed by: FAMILY MEDICINE

## 2023-05-08 PROCEDURE — 2022F DILAT RTA XM EVC RTNOPTHY: CPT | Performed by: FAMILY MEDICINE

## 2023-05-08 PROCEDURE — G8420 CALC BMI NORM PARAMETERS: HCPCS | Performed by: FAMILY MEDICINE

## 2023-05-08 PROCEDURE — 3078F DIAST BP <80 MM HG: CPT | Performed by: FAMILY MEDICINE

## 2023-05-08 ASSESSMENT — ENCOUNTER SYMPTOMS
GASTROINTESTINAL NEGATIVE: 1
RESPIRATORY NEGATIVE: 1
ABDOMINAL PAIN: 0
ALLERGIC/IMMUNOLOGIC NEGATIVE: 1
EYES NEGATIVE: 1

## 2023-05-08 ASSESSMENT — PATIENT HEALTH QUESTIONNAIRE - PHQ9
1. LITTLE INTEREST OR PLEASURE IN DOING THINGS: 0
SUM OF ALL RESPONSES TO PHQ QUESTIONS 1-9: 0
2. FEELING DOWN, DEPRESSED OR HOPELESS: 0
SUM OF ALL RESPONSES TO PHQ QUESTIONS 1-9: 0
SUM OF ALL RESPONSES TO PHQ9 QUESTIONS 1 & 2: 0
SUM OF ALL RESPONSES TO PHQ QUESTIONS 1-9: 0
SUM OF ALL RESPONSES TO PHQ QUESTIONS 1-9: 0

## 2023-05-08 NOTE — PROGRESS NOTES
TONSILLECTOMY      VASECTOMY        Vitals:    05/08/23 0958   BP: 132/72   Temp: 98.3 °F (36.8 °C)   TempSrc: Oral   Weight: 148 lb (67.1 kg)   Height: 5' 5\" (1.651 m)       Objective:    Physical Exam  Vitals reviewed. Constitutional:       Appearance: Normal appearance. He is well-developed. HENT:      Head: Normocephalic. Right Ear: Tympanic membrane normal.      Left Ear: Tympanic membrane normal.      Nose: Nose normal.      Mouth/Throat:      Mouth: Mucous membranes are moist.   Eyes:      Pupils: Pupils are equal, round, and reactive to light. Cardiovascular:      Rate and Rhythm: Normal rate and regular rhythm. Pulmonary:      Effort: Pulmonary effort is normal.      Breath sounds: Normal breath sounds. Abdominal:      General: Bowel sounds are normal.      Palpations: Abdomen is soft. Musculoskeletal:         General: Normal range of motion. Cervical back: Normal range of motion. Skin:     General: Skin is warm. Neurological:      Mental Status: He is alert and oriented to person, place, and time. Psychiatric:         Behavior: Behavior normal.       Boaz Britton was seen today for follow-up. Diagnoses and all orders for this visit:    Calculus of gallbladder without cholecystitis without obstruction  -     Mirna Ordoñez MD, General Surgery, [facility]    Aneurysm of descending thoracic aorta without rupture [facility])    Essential hypertension    Atherosclerosis of native coronary artery of native heart without angina pectoris    Diet-controlled diabetes mellitus [facility])        Comments: appt dr Erica mayo for surgyer       I educated the patient about all medications. Make sure they were correct and educated  on the risk associated with missing meds or taking them incorrectly. A list of medications is being sent home with patient today. Check blood pressure at home twice a day. Low-salt low caffeine diet.   Call if systolic blood pressure is

## 2023-05-18 ENCOUNTER — OFFICE VISIT (OUTPATIENT)
Dept: SURGERY | Age: 73
End: 2023-05-18
Payer: MEDICARE

## 2023-05-18 VITALS
SYSTOLIC BLOOD PRESSURE: 104 MMHG | HEART RATE: 67 BPM | BODY MASS INDEX: 24.46 KG/M2 | OXYGEN SATURATION: 96 % | DIASTOLIC BLOOD PRESSURE: 70 MMHG | WEIGHT: 147 LBS

## 2023-05-18 DIAGNOSIS — K80.20 SYMPTOMATIC CHOLELITHIASIS: Primary | ICD-10-CM

## 2023-05-18 PROCEDURE — G8420 CALC BMI NORM PARAMETERS: HCPCS | Performed by: SURGERY

## 2023-05-18 PROCEDURE — 99214 OFFICE O/P EST MOD 30 MIN: CPT | Performed by: SURGERY

## 2023-05-18 PROCEDURE — 3078F DIAST BP <80 MM HG: CPT | Performed by: SURGERY

## 2023-05-18 PROCEDURE — 3017F COLORECTAL CA SCREEN DOC REV: CPT | Performed by: SURGERY

## 2023-05-18 PROCEDURE — G8427 DOCREV CUR MEDS BY ELIG CLIN: HCPCS | Performed by: SURGERY

## 2023-05-18 PROCEDURE — 1036F TOBACCO NON-USER: CPT | Performed by: SURGERY

## 2023-05-18 PROCEDURE — 3074F SYST BP LT 130 MM HG: CPT | Performed by: SURGERY

## 2023-05-18 PROCEDURE — 1123F ACP DISCUSS/DSCN MKR DOCD: CPT | Performed by: SURGERY

## 2023-05-22 ENCOUNTER — HOSPITAL ENCOUNTER (OUTPATIENT)
Dept: CARDIAC REHAB | Age: 73
Discharge: HOME | End: 2023-05-22

## 2023-05-24 ENCOUNTER — CLINICAL DOCUMENTATION (OUTPATIENT)
Dept: SURGERY | Age: 73
End: 2023-05-24

## 2023-05-24 ENCOUNTER — TELEPHONE (OUTPATIENT)
Dept: SURGERY | Age: 73
End: 2023-05-24

## 2023-05-24 ENCOUNTER — PREP FOR PROCEDURE (OUTPATIENT)
Dept: SURGERY | Age: 73
End: 2023-05-24

## 2023-05-24 NOTE — TELEPHONE ENCOUNTER
Mecca Benjamin is scheduled for laparoscopic robotic XI assisted cholecystectomy with Dr Summer Veronica on 07-10-23 at [facility]. Patient needs to be NPO after midnight the night before procedure. All surgery instructions were explained to the patient and a surgery letter was also mailed out. MA informed patient that PAT will also be calling to review pre-op instructions and medications. Patient verbalized understanding.   Electronically signed by Marcelino Dangelo MA on 5/24/2023 at 10:58 AM

## 2023-05-24 NOTE — TELEPHONE ENCOUNTER
Prior Authorization Form:      DEMOGRAPHICS:                     Patient Name:  Altha Soulier  Patient :  1950            Insurance:  Payor: MEDICARE / Plan: MEDICARE PART A AND B / Product Type: *No Product type* /   Insurance ID Number:    Payer/Plan Subscr  Sex Relation Sub. Ins. ID Effective Group Num   1. [address] [facility] 1950 Male Self [id number]                                    [address] [address]   2.  [address] [facility] 1950 Male Self [id number] PLAN [id number]                                   [address]         DIAGNOSIS & PROCEDURE:                       Procedure/Operation: Laparoscopic robotic XI assisted cholecystectomy7           CPT Code: 01578    Diagnosis:  Symptomatic cholelithiasis    ICD10 Code: K80.20    [facility]  [facility]    Surgeon:  Dr Jackie Morales INFORMATION:                          Date: 07-10-23    Time: 9:30 am              Anesthesia:  General                                                       Status:  Outpatient        Special Comments:         Electronically signed by Sha Estrada MA on 2023 at 10:59 AM

## 2023-06-15 RX ORDER — SODIUM CHLORIDE 0.9 % (FLUSH) 0.9 %
5-40 SYRINGE (ML) INJECTION EVERY 12 HOURS SCHEDULED
Status: CANCELLED | OUTPATIENT
Start: 2023-06-15

## 2023-06-15 RX ORDER — SODIUM CHLORIDE 9 MG/ML
INJECTION, SOLUTION INTRAVENOUS PRN
Status: CANCELLED | OUTPATIENT
Start: 2023-06-15

## 2023-06-15 RX ORDER — SODIUM CHLORIDE 0.9 % (FLUSH) 0.9 %
5-40 SYRINGE (ML) INJECTION PRN
Status: CANCELLED | OUTPATIENT
Start: 2023-06-15

## 2023-06-16 ENCOUNTER — HOSPITAL ENCOUNTER (OUTPATIENT)
Age: 73
Setting detail: OUTPATIENT SURGERY
Discharge: HOME | End: 2023-06-16
Attending: UROLOGY | Admitting: UROLOGY
Payer: MEDICARE

## 2023-06-16 VITALS
RESPIRATION RATE: 16 BRPM | BODY MASS INDEX: 24.11 KG/M2 | HEART RATE: 53 BPM | TEMPERATURE: 97.6 F | DIASTOLIC BLOOD PRESSURE: 76 MMHG | HEIGHT: 66 IN | WEIGHT: 150 LBS | SYSTOLIC BLOOD PRESSURE: 133 MMHG | OXYGEN SATURATION: 97 %

## 2023-06-16 PROCEDURE — 7100000011 HC PHASE II RECOVERY - ADDTL 15 MIN: Performed by: UROLOGY

## 2023-06-16 PROCEDURE — 2709999900 HC NON-CHARGEABLE SUPPLY: Performed by: UROLOGY

## 2023-06-16 PROCEDURE — 3600000012 HC SURGERY LEVEL 2 ADDTL 15MIN: Performed by: UROLOGY

## 2023-06-16 PROCEDURE — 3600000002 HC SURGERY LEVEL 2 BASE: Performed by: UROLOGY

## 2023-06-16 PROCEDURE — 7100000010 HC PHASE II RECOVERY - FIRST 15 MIN: Performed by: UROLOGY

## 2023-06-16 PROCEDURE — 3700000000 HC ANESTHESIA ATTENDED CARE: Performed by: UROLOGY

## 2023-06-16 PROCEDURE — 3700000001 HC ADD 15 MINUTES (ANESTHESIA): Performed by: UROLOGY

## 2023-06-16 PROCEDURE — 6370000000 HC RX 637 (ALT 250 FOR IP): Performed by: UROLOGY

## 2023-06-16 PROCEDURE — C1889 IMPLANT/INSERT DEVICE, NOC: HCPCS | Performed by: UROLOGY

## 2023-06-16 DEVICE — SYSTEM UROLIFT2 W/ IMPL DEL DEV FOR TREAT OF URIN OUTFLO: Type: IMPLANTABLE DEVICE | Site: PROSTATE | Status: FUNCTIONAL

## 2023-06-16 DEVICE — DEVICE IMPLANT UROLIFT2 FOR TREAT OF URIN OUTFLO: Type: IMPLANTABLE DEVICE | Site: PROSTATE | Status: FUNCTIONAL

## 2023-06-16 RX ORDER — PHENAZOPYRIDINE HYDROCHLORIDE 100 MG/1
100 TABLET, FILM COATED ORAL 3 TIMES DAILY PRN
Qty: 30 TABLET | Refills: 0 | Status: SHIPPED | OUTPATIENT
Start: 2023-06-16 | End: 2023-06-26

## 2023-06-16 RX ORDER — SODIUM CHLORIDE 0.9 % (FLUSH) 0.9 %
5-40 SYRINGE (ML) INJECTION PRN
Status: DISCONTINUED | OUTPATIENT
Start: 2023-06-16 | End: 2023-06-16 | Stop reason: HOSPADM

## 2023-06-16 RX ORDER — SODIUM CHLORIDE 0.9 % (FLUSH) 0.9 %
5-40 SYRINGE (ML) INJECTION EVERY 12 HOURS SCHEDULED
Status: DISCONTINUED | OUTPATIENT
Start: 2023-06-16 | End: 2023-06-16 | Stop reason: HOSPADM

## 2023-06-16 RX ORDER — SODIUM CHLORIDE 9 MG/ML
INJECTION, SOLUTION INTRAVENOUS PRN
Status: DISCONTINUED | OUTPATIENT
Start: 2023-06-16 | End: 2023-06-16 | Stop reason: HOSPADM

## 2023-06-16 RX ORDER — CEPHALEXIN 250 MG/1
250 CAPSULE ORAL 3 TIMES DAILY
Qty: 9 CAPSULE | Refills: 0 | Status: ON HOLD | OUTPATIENT
Start: 2023-06-16 | End: 2023-06-19

## 2023-06-16 RX ORDER — LIDOCAINE HYDROCHLORIDE 20 MG/ML
JELLY TOPICAL PRN
Status: DISCONTINUED | OUTPATIENT
Start: 2023-06-16 | End: 2023-06-16 | Stop reason: ALTCHOICE

## 2023-06-16 ASSESSMENT — PAIN SCALES - GENERAL
PAINLEVEL_OUTOF10: 4
PAINLEVEL_OUTOF10: 4

## 2023-06-16 ASSESSMENT — PAIN DESCRIPTION - ONSET
ONSET: ON-GOING
ONSET: ON-GOING

## 2023-06-16 ASSESSMENT — PAIN DESCRIPTION - ORIENTATION
ORIENTATION: INNER
ORIENTATION: INNER

## 2023-06-16 ASSESSMENT — PAIN - FUNCTIONAL ASSESSMENT
PAIN_FUNCTIONAL_ASSESSMENT: 0-10
PAIN_FUNCTIONAL_ASSESSMENT: ACTIVITIES ARE NOT PREVENTED
PAIN_FUNCTIONAL_ASSESSMENT: ACTIVITIES ARE NOT PREVENTED

## 2023-06-16 ASSESSMENT — PAIN DESCRIPTION - PAIN TYPE
TYPE: SURGICAL PAIN
TYPE: SURGICAL PAIN

## 2023-06-16 ASSESSMENT — PAIN DESCRIPTION - LOCATION
LOCATION: PERINEUM
LOCATION: PERINEUM

## 2023-06-16 ASSESSMENT — PAIN DESCRIPTION - FREQUENCY
FREQUENCY: CONTINUOUS
FREQUENCY: CONTINUOUS

## 2023-06-16 ASSESSMENT — PAIN DESCRIPTION - DESCRIPTORS
DESCRIPTORS: BURNING;DISCOMFORT
DESCRIPTORS: DISCOMFORT

## 2023-06-16 NOTE — H&P
Evins Cowden is a 81 y.o. male with bph. Past Medical History:   Diagnosis Date    Acute respiratory failure ([facility] [address] 04/23/2022    Aneurysm artery, pulmonary (HCC)     Arthritis     rheumatoid    Asthma 1974    status asthmaticus in 3/2004. Cancer ([facility] [address]     basal cell to face    Community acquired pneumonia of left lower lobe of lung     resolved    Deviated septum     S/P repair x 3. Hepatitis B     Hypercholesterolemia     Intolerance to statins. Hypertension     Kidney stone     Restless leg syndrome 03/2004    Negative sleep study for sleep apnea. Positive for restless leg syndrome. Rheumatoid arthritis(714.0)        Past Surgical History:   Procedure Laterality Date    ADENOIDECTOMY      CARDIAC CATHETERIZATION      CARDIAC VALVE SURGERY  06/16/2020    AVR and repair of ascending aortic aneurysm in [place]    CARDIOVASCULAR STRESS TEST  1994    Stress-induced ischemia involving the interventricular septum    CATARACT REMOVAL Bilateral 2002    COLONOSCOPY      CYST REMOVAL      DIAGNOSTIC CARDIAC CATH LAB PROCEDURE      Normal per patient (this was done by Dr. Daniela Wolf and records are not available.)    HERNIA REPAIR Left     HERNIA REPAIR      Right luminal hernia repair. NASAL SEPTUM SURGERY      SKIN BIOPSY  03/27/2018    Dr Dorian James cheek-ulcerated basal cell carcinoma    TONSILLECTOMY      VASECTOMY         Family History   Adopted: Yes       Prior to Admission medications    Medication Sig Start Date End Date Taking?  Authorizing Provider   Elastic Bandages & Supports (PROCARE MAXTRAX AIR ANKLE WALK) MISC by Does not apply route   Yes Historical Provider, MD   Albuterol Sulfate (PROAIR HFA IN) Inhale into the lungs as needed   Yes Historical Provider, MD   vitamin B-12 (CYANOCOBALAMIN) 1000 MCG tablet Take 1 tablet by mouth daily    Historical Provider, MD   albuterol (PROVENTIL) (2.5 MG/3ML) 0.083% nebulizer solution Take 3 mLs by nebulization every 6 hours as needed for Wheezing

## 2023-06-16 NOTE — OP NOTE
month with a postvoid residual.  He had a high riding bladder neck.       Electronically signed by Leigha Wilkinson MD on 6/16/2023 at 10:24 AM

## 2023-06-16 NOTE — DISCHARGE INSTRUCTIONS
Discharge instructions for Dr. Lin Nicely:   -Bisi Pressley will be groggy throughout the day due to the effects of anesthesia. Have a responsible adult monitor you for the next 24 hours.  -Call if fever or chills  -It is very normal to have burning and pain. If the pain is severe and out of your control contact Dr. Leroy Noyola. Diet: You may eat or drink whatever you like today. You may experience some nausea. Call if you have vomiting or inability to tolerate food or drink. Urine: Expect blood in your urine. This is normal.  This may be very traumatic in appearance but is not concerning unless there are large amount of clots that prevent you from being able to urinate. If you have any questions or concerns contact Dr. Donavan Gomes office. Driving: You may not drive for 24 hours. You also may not drive if you are taking narcotic pain medications. Activity: There are no restrictions on your activity. Remove your Zavaleta catheter Monday morning as instructed. Cut just below the plastic piece on the balloon port. Once in the shower after the balloon drains the catheter will fall out. Call if any concerns.

## 2023-06-16 NOTE — PROGRESS NOTES
Discharge instructions given verbalized understanding
Leg bag applied patient and verbalized understanding
Nourishment given tolerating without difficulty
make up or hair products on the day of surgery    [x] You can expect a call the business day prior to procedure to notify you if your arrival time changes    [x] If you receive a survey after surgery we would greatly appreciate your comments    [] Parent/guardian of a minor must accompany their child and remain on the premises  the entire time they are under our care     [] Pediatric patients may bring favorite toy, blanket or comfort item with them    [] A caregiver or family member must remain with the patient during their stay if they are mentally handicapped, have dementia, disoriented or unable to use a call light or would be a safety concern if left unattended    [x] Please notify surgeon if you develop any illness between now and time of surgery (cold, cough, sore throat, fever, nausea, vomiting) or any signs of infections  including skin, wounds, and dental.    []  The Outpatient Pharmacy is available to fill your prescription here on your day of surgery, ask your preop nurse for details    [x] Other instructions: Wear comfortable clothing    EDUCATIONAL MATERIALS PROVIDED:    [] PAT Preoperative Education Packet/Booklet     [] Medication List    [] Transfusion bracelet applied with instructions    [] Shower with soap, lather and rinse well, and use CHG wipes provided the evening before surgery as instructed    [] Incentive spirometer with instructions

## 2023-06-18 ENCOUNTER — HOSPITAL ENCOUNTER (INPATIENT)
Age: 73
LOS: 2 days | Discharge: HOME | DRG: 546 | End: 2023-06-22
Attending: EMERGENCY MEDICINE | Admitting: INTERNAL MEDICINE
Payer: MEDICARE

## 2023-06-18 ENCOUNTER — APPOINTMENT (OUTPATIENT)
Dept: CT IMAGING | Age: 73
DRG: 546 | End: 2023-06-18
Payer: MEDICARE

## 2023-06-18 DIAGNOSIS — N30.01 ACUTE CYSTITIS WITH HEMATURIA: Primary | ICD-10-CM

## 2023-06-18 DIAGNOSIS — R10.31 INGUINAL PAIN, RIGHT: ICD-10-CM

## 2023-06-18 DIAGNOSIS — N28.1 BILATERAL RENAL CYSTS: ICD-10-CM

## 2023-06-18 PROBLEM — R26.2 UNABLE TO AMBULATE: Status: ACTIVE | Noted: 2023-06-18

## 2023-06-18 LAB
ALBUMIN SERPL-MCNC: 4.5 G/DL (ref 3.5–5.2)
ALP SERPL-CCNC: 54 U/L (ref 40–129)
ALT SERPL-CCNC: 16 U/L (ref 0–40)
ANION GAP SERPL CALCULATED.3IONS-SCNC: 13 MMOL/L (ref 7–16)
AST SERPL-CCNC: 23 U/L (ref 0–39)
BACTERIA URNS QL MICRO: ABNORMAL /HPF
BASOPHILS # BLD: 0.05 E9/L (ref 0–0.2)
BASOPHILS NFR BLD: 0.5 % (ref 0–2)
BILIRUB SERPL-MCNC: 1.8 MG/DL (ref 0–1.2)
BILIRUB UR QL STRIP: ABNORMAL
BUN SERPL-MCNC: 13 MG/DL (ref 6–23)
CALCIUM SERPL-MCNC: 9.3 MG/DL (ref 8.6–10.2)
CHLORIDE SERPL-SCNC: 99 MMOL/L (ref 98–107)
CLARITY UR: ABNORMAL
CO2 SERPL-SCNC: 24 MMOL/L (ref 22–29)
COLOR UR: YELLOW
CREAT SERPL-MCNC: 0.9 MG/DL (ref 0.7–1.2)
EOSINOPHIL # BLD: 0.33 E9/L (ref 0.05–0.5)
EOSINOPHIL NFR BLD: 3 % (ref 0–6)
ERYTHROCYTE [DISTWIDTH] IN BLOOD BY AUTOMATED COUNT: 12.8 FL (ref 11.5–15)
GLUCOSE SERPL-MCNC: 141 MG/DL (ref 74–99)
GLUCOSE UR STRIP-MCNC: 250 MG/DL
HCT VFR BLD AUTO: 42.4 % (ref 37–54)
HGB BLD-MCNC: 14 G/DL (ref 12.5–16.5)
HGB UR QL STRIP: ABNORMAL
IMM GRANULOCYTES # BLD: 0.05 E9/L
IMM GRANULOCYTES NFR BLD: 0.5 % (ref 0–5)
KETONES UR STRIP-MCNC: 15 MG/DL
LACTATE BLDV-SCNC: 2.4 MMOL/L (ref 0.5–2.2)
LEUKOCYTE ESTERASE UR QL STRIP: ABNORMAL
LYMPHOCYTES # BLD: 1.15 E9/L (ref 1.5–4)
LYMPHOCYTES NFR BLD: 10.5 % (ref 20–42)
MCH RBC QN AUTO: 32.1 PG (ref 26–35)
MCHC RBC AUTO-ENTMCNC: 33 % (ref 32–34.5)
MCV RBC AUTO: 97.2 FL (ref 80–99.9)
MONOCYTES # BLD: 1.1 E9/L (ref 0.1–0.95)
MONOCYTES NFR BLD: 10.1 % (ref 2–12)
NEUTROPHILS # BLD: 8.25 E9/L (ref 1.8–7.3)
NEUTS SEG NFR BLD: 75.4 % (ref 43–80)
NITRITE UR QL STRIP: POSITIVE
PH UR STRIP: 6.5 [PH] (ref 5–9)
PLATELET # BLD AUTO: 137 E9/L (ref 130–450)
PMV BLD AUTO: 10 FL (ref 7–12)
POTASSIUM SERPL-SCNC: 3.6 MMOL/L (ref 3.5–5)
PROT SERPL-MCNC: 7 G/DL (ref 6.4–8.3)
PROT UR STRIP-MCNC: >=300 MG/DL
RBC # BLD AUTO: 4.36 E12/L (ref 3.8–5.8)
RBC #/AREA URNS HPF: >20 /HPF (ref 0–2)
SODIUM SERPL-SCNC: 136 MMOL/L (ref 132–146)
SP GR UR STRIP: 1.02 (ref 1–1.03)
UROBILINOGEN UR STRIP-ACNC: 4 E.U./DL
WBC # BLD: 10.9 E9/L (ref 4.5–11.5)
WBC #/AREA URNS HPF: ABNORMAL /HPF (ref 0–5)

## 2023-06-18 PROCEDURE — 6360000002 HC RX W HCPCS

## 2023-06-18 PROCEDURE — 81001 URINALYSIS AUTO W/SCOPE: CPT

## 2023-06-18 PROCEDURE — 6360000004 HC RX CONTRAST MEDICATION: Performed by: RADIOLOGY

## 2023-06-18 PROCEDURE — 96376 TX/PRO/DX INJ SAME DRUG ADON: CPT

## 2023-06-18 PROCEDURE — 87088 URINE BACTERIA CULTURE: CPT

## 2023-06-18 PROCEDURE — 99285 EMERGENCY DEPT VISIT HI MDM: CPT

## 2023-06-18 PROCEDURE — 85025 COMPLETE CBC W/AUTO DIFF WBC: CPT

## 2023-06-18 PROCEDURE — 99222 1ST HOSP IP/OBS MODERATE 55: CPT | Performed by: INTERNAL MEDICINE

## 2023-06-18 PROCEDURE — 96374 THER/PROPH/DIAG INJ IV PUSH: CPT

## 2023-06-18 PROCEDURE — G0378 HOSPITAL OBSERVATION PER HR: HCPCS

## 2023-06-18 PROCEDURE — 80053 COMPREHEN METABOLIC PANEL: CPT

## 2023-06-18 PROCEDURE — 96361 HYDRATE IV INFUSION ADD-ON: CPT

## 2023-06-18 PROCEDURE — 96375 TX/PRO/DX INJ NEW DRUG ADDON: CPT

## 2023-06-18 PROCEDURE — 83605 ASSAY OF LACTIC ACID: CPT

## 2023-06-18 PROCEDURE — 74177 CT ABD & PELVIS W/CONTRAST: CPT

## 2023-06-18 PROCEDURE — 2580000003 HC RX 258

## 2023-06-18 RX ORDER — CEFDINIR 300 MG/1
300 CAPSULE ORAL 2 TIMES DAILY
Qty: 14 CAPSULE | Refills: 0 | Status: SHIPPED | OUTPATIENT
Start: 2023-06-18 | End: 2023-06-19

## 2023-06-18 RX ORDER — SODIUM CHLORIDE 9 MG/ML
INJECTION, SOLUTION INTRAVENOUS PRN
Status: DISCONTINUED | OUTPATIENT
Start: 2023-06-18 | End: 2023-06-22 | Stop reason: HOSPADM

## 2023-06-18 RX ORDER — ACETAMINOPHEN 325 MG/1
650 TABLET ORAL EVERY 4 HOURS PRN
Status: DISCONTINUED | OUTPATIENT
Start: 2023-06-18 | End: 2023-06-19

## 2023-06-18 RX ORDER — ONDANSETRON 4 MG/1
4 TABLET, ORALLY DISINTEGRATING ORAL EVERY 8 HOURS PRN
Status: DISCONTINUED | OUTPATIENT
Start: 2023-06-18 | End: 2023-06-22 | Stop reason: HOSPADM

## 2023-06-18 RX ORDER — ENOXAPARIN SODIUM 100 MG/ML
40 INJECTION SUBCUTANEOUS DAILY
Status: DISCONTINUED | OUTPATIENT
Start: 2023-06-19 | End: 2023-06-22 | Stop reason: HOSPADM

## 2023-06-18 RX ORDER — SODIUM CHLORIDE 0.9 % (FLUSH) 0.9 %
5-40 SYRINGE (ML) INJECTION EVERY 12 HOURS SCHEDULED
Status: DISCONTINUED | OUTPATIENT
Start: 2023-06-18 | End: 2023-06-22 | Stop reason: HOSPADM

## 2023-06-18 RX ORDER — FENTANYL CITRATE 50 UG/ML
50 INJECTION, SOLUTION INTRAMUSCULAR; INTRAVENOUS ONCE
Status: COMPLETED | OUTPATIENT
Start: 2023-06-18 | End: 2023-06-18

## 2023-06-18 RX ORDER — ONDANSETRON 2 MG/ML
4 INJECTION INTRAMUSCULAR; INTRAVENOUS EVERY 6 HOURS PRN
Status: DISCONTINUED | OUTPATIENT
Start: 2023-06-18 | End: 2023-06-22 | Stop reason: HOSPADM

## 2023-06-18 RX ORDER — 0.9 % SODIUM CHLORIDE 0.9 %
1000 INTRAVENOUS SOLUTION INTRAVENOUS ONCE
Status: COMPLETED | OUTPATIENT
Start: 2023-06-18 | End: 2023-06-18

## 2023-06-18 RX ORDER — SODIUM CHLORIDE 0.9 % (FLUSH) 0.9 %
5-40 SYRINGE (ML) INJECTION PRN
Status: DISCONTINUED | OUTPATIENT
Start: 2023-06-18 | End: 2023-06-22 | Stop reason: HOSPADM

## 2023-06-18 RX ADMIN — SODIUM CHLORIDE 1000 ML: 9 INJECTION, SOLUTION INTRAVENOUS at 18:18

## 2023-06-18 RX ADMIN — HYDROMORPHONE HYDROCHLORIDE 0.5 MG: 0.5 INJECTION, SOLUTION INTRAMUSCULAR; INTRAVENOUS; SUBCUTANEOUS at 19:47

## 2023-06-18 RX ADMIN — FENTANYL CITRATE 50 MCG: 0.05 INJECTION, SOLUTION INTRAMUSCULAR; INTRAVENOUS at 18:16

## 2023-06-18 RX ADMIN — HYDROMORPHONE HYDROCHLORIDE 0.5 MG: 0.5 INJECTION, SOLUTION INTRAMUSCULAR; INTRAVENOUS; SUBCUTANEOUS at 21:38

## 2023-06-18 RX ADMIN — CEFTRIAXONE 1000 MG: 1 INJECTION, POWDER, FOR SOLUTION INTRAMUSCULAR; INTRAVENOUS at 20:40

## 2023-06-18 RX ADMIN — IOPAMIDOL 75 ML: 755 INJECTION, SOLUTION INTRAVENOUS at 19:02

## 2023-06-18 ASSESSMENT — PAIN DESCRIPTION - LOCATION
LOCATION: HIP;GROIN
LOCATION: HIP;GROIN
LOCATION: GROIN;HIP

## 2023-06-18 ASSESSMENT — PAIN SCALES - GENERAL
PAINLEVEL_OUTOF10: 10

## 2023-06-18 ASSESSMENT — PAIN DESCRIPTION - DESCRIPTORS
DESCRIPTORS: ACHING

## 2023-06-18 ASSESSMENT — PAIN DESCRIPTION - FREQUENCY
FREQUENCY: CONTINUOUS
FREQUENCY: CONTINUOUS

## 2023-06-18 ASSESSMENT — PAIN DESCRIPTION - ORIENTATION
ORIENTATION: RIGHT

## 2023-06-18 ASSESSMENT — LIFESTYLE VARIABLES
HOW MANY STANDARD DRINKS CONTAINING ALCOHOL DO YOU HAVE ON A TYPICAL DAY: 1 OR 2
HOW OFTEN DO YOU HAVE A DRINK CONTAINING ALCOHOL: 2-4 TIMES A MONTH

## 2023-06-18 ASSESSMENT — PAIN - FUNCTIONAL ASSESSMENT: PAIN_FUNCTIONAL_ASSESSMENT: 0-10

## 2023-06-18 ASSESSMENT — PAIN DESCRIPTION - PAIN TYPE
TYPE: ACUTE PAIN
TYPE: ACUTE PAIN

## 2023-06-18 NOTE — ED NOTES
Patient is still complaining of 10/10 pain on the right hip/groin. Tender to touch, cannot move leg without pain. Urinary catheter emptied, 400mL.      Domenic Bautista RN  06/18/23 1956

## 2023-06-18 NOTE — ED NOTES
Report to [facility] RN, care of patient relinquished.       Brady Rinne, RN  06/18/23 4135

## 2023-06-18 NOTE — ED PROVIDER NOTES
Arely Merritt is a 81 y.o. male    HPI  Arely Merritt is a 81 y.o. male presenting to the ED for Groin Pain (right groin and hip pain onset yesterday, urolift done here Friday, states was supposed to remove catheter today but chose not to d/t pain, has appt with urology tomorrow) and Hip Pain    History comes primarily from the patient. Patient presents to the emergency department for right inguinal and hip pain which started yesterday. Patient had a UroLift procedure done here 2 days ago with Dr. Mukesh Meyer. Patient currently has a Zavaleta catheter in place since the procedure and has a follow-up appointment with urology tomorrow. He is post to have the catheter removed. The patient was also told that he could remove the catheter today if he was feeling up to it. Because of his current pain, the patient chose to leave the Zavaleta catheter in place. The patient says the pain is moderate to severe, constant. It starts in his right inguinal region and he feels like it wraps around into his right gluteal muscle region. ROS  Full review of systems completed. Pertinent positives and negatives per the HPI, unless otherwise stated ROS is negative. Physical Exam  Vitals reviewed. Constitutional:       General: He is not in acute distress. Appearance: Normal appearance. He is not ill-appearing. HENT:      Head: Normocephalic and atraumatic. Right Ear: External ear normal.      Left Ear: External ear normal.      Nose: Nose normal. No rhinorrhea. Mouth/Throat:      Mouth: Mucous membranes are moist.      Pharynx: Oropharynx is clear. Eyes:      Extraocular Movements: Extraocular movements intact. Conjunctiva/sclera: Conjunctivae normal.      Pupils: Pupils are equal, round, and reactive to light. Cardiovascular:      Rate and Rhythm: Normal rate and regular rhythm. Heart sounds: Normal heart sounds. No murmur heard.   Pulmonary:      Effort: Pulmonary effort is normal. No respiratory

## 2023-06-19 PROBLEM — N30.01 ACUTE CYSTITIS WITH HEMATURIA: Status: ACTIVE | Noted: 2023-06-19

## 2023-06-19 LAB
ALBUMIN SERPL-MCNC: 4 G/DL (ref 3.5–5.2)
ALP SERPL-CCNC: 49 U/L (ref 40–129)
ALT SERPL-CCNC: 15 U/L (ref 0–40)
ANION GAP SERPL CALCULATED.3IONS-SCNC: 9 MMOL/L (ref 7–16)
AST SERPL-CCNC: 17 U/L (ref 0–39)
BASOPHILS # BLD: 0.05 E9/L (ref 0–0.2)
BASOPHILS NFR BLD: 0.5 % (ref 0–2)
BILIRUB SERPL-MCNC: 1.9 MG/DL (ref 0–1.2)
BUN SERPL-MCNC: 11 MG/DL (ref 6–23)
CALCIUM SERPL-MCNC: 9.2 MG/DL (ref 8.6–10.2)
CHLORIDE SERPL-SCNC: 101 MMOL/L (ref 98–107)
CO2 SERPL-SCNC: 27 MMOL/L (ref 22–29)
CREAT SERPL-MCNC: 1 MG/DL (ref 0.7–1.2)
CRP SERPL HS-MCNC: 12.6 MG/DL (ref 0–0.4)
EOSINOPHIL # BLD: 0.32 E9/L (ref 0.05–0.5)
EOSINOPHIL NFR BLD: 3.3 % (ref 0–6)
ERYTHROCYTE [DISTWIDTH] IN BLOOD BY AUTOMATED COUNT: 12.9 FL (ref 11.5–15)
GLUCOSE SERPL-MCNC: 129 MG/DL (ref 74–99)
HCT VFR BLD AUTO: 40.5 % (ref 37–54)
HGB BLD-MCNC: 13.4 G/DL (ref 12.5–16.5)
IMM GRANULOCYTES # BLD: 0.03 E9/L
IMM GRANULOCYTES NFR BLD: 0.3 % (ref 0–5)
LACTATE BLDV-SCNC: 0.8 MMOL/L (ref 0.5–2.2)
LYMPHOCYTES # BLD: 1.51 E9/L (ref 1.5–4)
LYMPHOCYTES NFR BLD: 15.7 % (ref 20–42)
MCH RBC QN AUTO: 32.8 PG (ref 26–35)
MCHC RBC AUTO-ENTMCNC: 33.1 % (ref 32–34.5)
MCV RBC AUTO: 99.3 FL (ref 80–99.9)
MONOCYTES # BLD: 1.35 E9/L (ref 0.1–0.95)
MONOCYTES NFR BLD: 14 % (ref 2–12)
NEUTROPHILS # BLD: 6.36 E9/L (ref 1.8–7.3)
NEUTS SEG NFR BLD: 66.2 % (ref 43–80)
PLATELET # BLD AUTO: 134 E9/L (ref 130–450)
PMV BLD AUTO: 10.1 FL (ref 7–12)
POTASSIUM SERPL-SCNC: 4.3 MMOL/L (ref 3.5–5)
PROCALCITONIN: 0.06 NG/ML (ref 0–0.08)
PROT SERPL-MCNC: 6.5 G/DL (ref 6.4–8.3)
RBC # BLD AUTO: 4.08 E12/L (ref 3.8–5.8)
SODIUM SERPL-SCNC: 137 MMOL/L (ref 132–146)
VITAMIN D 25-HYDROXY: 33 NG/ML (ref 30–100)
WBC # BLD: 9.6 E9/L (ref 4.5–11.5)

## 2023-06-19 PROCEDURE — 94664 DEMO&/EVAL PT USE INHALER: CPT

## 2023-06-19 PROCEDURE — 86140 C-REACTIVE PROTEIN: CPT

## 2023-06-19 PROCEDURE — 84145 PROCALCITONIN (PCT): CPT

## 2023-06-19 PROCEDURE — 96376 TX/PRO/DX INJ SAME DRUG ADON: CPT

## 2023-06-19 PROCEDURE — 99232 SBSQ HOSP IP/OBS MODERATE 35: CPT | Performed by: INTERNAL MEDICINE

## 2023-06-19 PROCEDURE — 96375 TX/PRO/DX INJ NEW DRUG ADDON: CPT

## 2023-06-19 PROCEDURE — 85025 COMPLETE CBC W/AUTO DIFF WBC: CPT

## 2023-06-19 PROCEDURE — G0378 HOSPITAL OBSERVATION PER HR: HCPCS

## 2023-06-19 PROCEDURE — 83605 ASSAY OF LACTIC ACID: CPT

## 2023-06-19 PROCEDURE — 6360000002 HC RX W HCPCS: Performed by: INTERNAL MEDICINE

## 2023-06-19 PROCEDURE — 2580000003 HC RX 258: Performed by: INTERNAL MEDICINE

## 2023-06-19 PROCEDURE — 99233 SBSQ HOSP IP/OBS HIGH 50: CPT | Performed by: INTERNAL MEDICINE

## 2023-06-19 PROCEDURE — 96372 THER/PROPH/DIAG INJ SC/IM: CPT

## 2023-06-19 PROCEDURE — 36415 COLL VENOUS BLD VENIPUNCTURE: CPT

## 2023-06-19 PROCEDURE — 94640 AIRWAY INHALATION TREATMENT: CPT

## 2023-06-19 PROCEDURE — 82306 VITAMIN D 25 HYDROXY: CPT

## 2023-06-19 PROCEDURE — 6370000000 HC RX 637 (ALT 250 FOR IP): Performed by: INTERNAL MEDICINE

## 2023-06-19 PROCEDURE — 80053 COMPREHEN METABOLIC PANEL: CPT

## 2023-06-19 RX ORDER — MORPHINE SULFATE 4 MG/ML
4 INJECTION, SOLUTION INTRAMUSCULAR; INTRAVENOUS
Status: DISCONTINUED | OUTPATIENT
Start: 2023-06-19 | End: 2023-06-22 | Stop reason: HOSPADM

## 2023-06-19 RX ORDER — FLUTICASONE PROPIONATE 50 MCG
1 SPRAY, SUSPENSION (ML) NASAL DAILY
Status: DISCONTINUED | OUTPATIENT
Start: 2023-06-19 | End: 2023-06-22 | Stop reason: HOSPADM

## 2023-06-19 RX ORDER — PHENAZOPYRIDINE HYDROCHLORIDE 100 MG/1
100 TABLET, FILM COATED ORAL 3 TIMES DAILY PRN
Status: DISCONTINUED | OUTPATIENT
Start: 2023-06-19 | End: 2023-06-22 | Stop reason: HOSPADM

## 2023-06-19 RX ORDER — ACETAMINOPHEN 325 MG/1
325 TABLET ORAL EVERY 4 HOURS PRN
Status: DISCONTINUED | OUTPATIENT
Start: 2023-06-19 | End: 2023-06-22 | Stop reason: HOSPADM

## 2023-06-19 RX ORDER — MORPHINE SULFATE 2 MG/ML
2 INJECTION, SOLUTION INTRAMUSCULAR; INTRAVENOUS
Status: DISCONTINUED | OUTPATIENT
Start: 2023-06-19 | End: 2023-06-22 | Stop reason: HOSPADM

## 2023-06-19 RX ORDER — ATORVASTATIN CALCIUM 10 MG/1
10 TABLET, FILM COATED ORAL NIGHTLY
Status: DISCONTINUED | OUTPATIENT
Start: 2023-06-19 | End: 2023-06-22 | Stop reason: HOSPADM

## 2023-06-19 RX ORDER — ALBUTEROL SULFATE 2.5 MG/3ML
2.5 SOLUTION RESPIRATORY (INHALATION) EVERY 6 HOURS PRN
Status: DISCONTINUED | OUTPATIENT
Start: 2023-06-19 | End: 2023-06-22 | Stop reason: HOSPADM

## 2023-06-19 RX ORDER — COLCHICINE 0.6 MG/1
0.6 TABLET ORAL 2 TIMES DAILY
Status: COMPLETED | OUTPATIENT
Start: 2023-06-19 | End: 2023-06-21

## 2023-06-19 RX ORDER — BUDESONIDE AND FORMOTEROL FUMARATE DIHYDRATE 160; 4.5 UG/1; UG/1
2 AEROSOL RESPIRATORY (INHALATION)
Status: DISCONTINUED | OUTPATIENT
Start: 2023-06-19 | End: 2023-06-19 | Stop reason: CLARIF

## 2023-06-19 RX ORDER — BUDESONIDE 0.5 MG/2ML
0.5 INHALANT ORAL
Status: DISCONTINUED | OUTPATIENT
Start: 2023-06-19 | End: 2023-06-22 | Stop reason: HOSPADM

## 2023-06-19 RX ORDER — KETOROLAC TROMETHAMINE 30 MG/ML
15 INJECTION, SOLUTION INTRAMUSCULAR; INTRAVENOUS EVERY 6 HOURS
Status: COMPLETED | OUTPATIENT
Start: 2023-06-19 | End: 2023-06-19

## 2023-06-19 RX ORDER — ARFORMOTEROL TARTRATE 15 UG/2ML
15 SOLUTION RESPIRATORY (INHALATION)
Status: DISCONTINUED | OUTPATIENT
Start: 2023-06-19 | End: 2023-06-22 | Stop reason: HOSPADM

## 2023-06-19 RX ORDER — TAMSULOSIN HYDROCHLORIDE 0.4 MG/1
0.4 CAPSULE ORAL NIGHTLY
Status: DISCONTINUED | OUTPATIENT
Start: 2023-06-19 | End: 2023-06-19

## 2023-06-19 RX ORDER — ASPIRIN 81 MG/1
81 TABLET, CHEWABLE ORAL DAILY
Status: DISCONTINUED | OUTPATIENT
Start: 2023-06-19 | End: 2023-06-22 | Stop reason: HOSPADM

## 2023-06-19 RX ADMIN — ATORVASTATIN CALCIUM 10 MG: 10 TABLET, FILM COATED ORAL at 01:05

## 2023-06-19 RX ADMIN — KETOROLAC TROMETHAMINE 15 MG: 30 INJECTION, SOLUTION INTRAMUSCULAR; INTRAVENOUS at 19:56

## 2023-06-19 RX ADMIN — MORPHINE SULFATE 4 MG: 4 INJECTION, SOLUTION INTRAMUSCULAR; INTRAVENOUS at 15:49

## 2023-06-19 RX ADMIN — ONDANSETRON 4 MG: 2 INJECTION INTRAMUSCULAR; INTRAVENOUS at 01:05

## 2023-06-19 RX ADMIN — COLCHICINE 0.6 MG: 0.6 TABLET ORAL at 19:54

## 2023-06-19 RX ADMIN — ARFORMOTEROL TARTRATE 15 MCG: 15 SOLUTION RESPIRATORY (INHALATION) at 07:46

## 2023-06-19 RX ADMIN — Medication 10 ML: at 19:58

## 2023-06-19 RX ADMIN — COLCHICINE 0.6 MG: 0.6 TABLET ORAL at 11:05

## 2023-06-19 RX ADMIN — ASPIRIN 81 MG CHEWABLE TABLET 81 MG: 81 TABLET CHEWABLE at 09:11

## 2023-06-19 RX ADMIN — BUDESONIDE INHALATION 500 MCG: 0.5 SUSPENSION RESPIRATORY (INHALATION) at 07:46

## 2023-06-19 RX ADMIN — MORPHINE SULFATE 4 MG: 4 INJECTION, SOLUTION INTRAMUSCULAR; INTRAVENOUS at 05:17

## 2023-06-19 RX ADMIN — WATER 1000 MG: 1 INJECTION INTRAMUSCULAR; INTRAVENOUS; SUBCUTANEOUS at 19:56

## 2023-06-19 RX ADMIN — MORPHINE SULFATE 4 MG: 4 INJECTION, SOLUTION INTRAMUSCULAR; INTRAVENOUS at 01:05

## 2023-06-19 RX ADMIN — KETOROLAC TROMETHAMINE 15 MG: 30 INJECTION, SOLUTION INTRAMUSCULAR; INTRAVENOUS at 15:12

## 2023-06-19 RX ADMIN — ARFORMOTEROL TARTRATE 15 MCG: 15 SOLUTION RESPIRATORY (INHALATION) at 19:28

## 2023-06-19 RX ADMIN — Medication 10 ML: at 11:05

## 2023-06-19 RX ADMIN — MORPHINE SULFATE 4 MG: 4 INJECTION, SOLUTION INTRAMUSCULAR; INTRAVENOUS at 11:05

## 2023-06-19 RX ADMIN — Medication 10 ML: at 01:06

## 2023-06-19 RX ADMIN — BUDESONIDE INHALATION 500 MCG: 0.5 SUSPENSION RESPIRATORY (INHALATION) at 19:28

## 2023-06-19 RX ADMIN — PHENAZOPYRIDINE 100 MG: 100 TABLET ORAL at 15:49

## 2023-06-19 RX ADMIN — KETOROLAC TROMETHAMINE 15 MG: 30 INJECTION, SOLUTION INTRAMUSCULAR; INTRAVENOUS at 09:10

## 2023-06-19 RX ADMIN — TAMSULOSIN HYDROCHLORIDE 0.4 MG: 0.4 CAPSULE ORAL at 01:05

## 2023-06-19 RX ADMIN — ENOXAPARIN SODIUM 40 MG: 100 INJECTION SUBCUTANEOUS at 09:10

## 2023-06-19 RX ADMIN — ATORVASTATIN CALCIUM 10 MG: 10 TABLET, FILM COATED ORAL at 19:54

## 2023-06-19 RX ADMIN — PHENAZOPYRIDINE 100 MG: 100 TABLET ORAL at 05:21

## 2023-06-19 RX ADMIN — FLUTICASONE PROPIONATE 1 SPRAY: 50 SPRAY, METERED NASAL at 11:05

## 2023-06-19 ASSESSMENT — PAIN SCALES - GENERAL
PAINLEVEL_OUTOF10: 5
PAINLEVEL_OUTOF10: 2
PAINLEVEL_OUTOF10: 10
PAINLEVEL_OUTOF10: 7
PAINLEVEL_OUTOF10: 10
PAINLEVEL_OUTOF10: 10
PAINLEVEL_OUTOF10: 8
PAINLEVEL_OUTOF10: 2
PAINLEVEL_OUTOF10: 10

## 2023-06-19 ASSESSMENT — PAIN DESCRIPTION - DESCRIPTORS
DESCRIPTORS: SHARP;PRESSURE;STABBING
DESCRIPTORS: ACHING;SHARP;DISCOMFORT;SHOOTING
DESCRIPTORS: SPASM
DESCRIPTORS: SORE;DISCOMFORT

## 2023-06-19 ASSESSMENT — PAIN DESCRIPTION - ORIENTATION
ORIENTATION: RIGHT
ORIENTATION: RIGHT
ORIENTATION: MID
ORIENTATION: RIGHT
ORIENTATION: RIGHT;LOWER

## 2023-06-19 ASSESSMENT — PAIN - FUNCTIONAL ASSESSMENT
PAIN_FUNCTIONAL_ASSESSMENT: PREVENTS OR INTERFERES SOME ACTIVE ACTIVITIES AND ADLS
PAIN_FUNCTIONAL_ASSESSMENT: PREVENTS OR INTERFERES SOME ACTIVE ACTIVITIES AND ADLS

## 2023-06-19 ASSESSMENT — PAIN DESCRIPTION - LOCATION
LOCATION: GROIN
LOCATION: ABDOMEN;PELVIS
LOCATION: GROIN
LOCATION: ABDOMEN;PELVIS
LOCATION: ABDOMEN

## 2023-06-19 NOTE — CARE COORDINATION
Social Work:    Ambreen at [employer] advised that they cannot  until 4:00 p.m. Social work updated patient, family, and RN.     Electronically signed by ZACHARY Sanchez on 6/19/2023 at 2:17 PM

## 2023-06-19 NOTE — H&P
[facility] History and Physical        Chief Complaint:  groin pain  History of Present Illness   The patient is a 81 y.o. male      Hxof BPH - on Friday 3 days ago --UroLift - by Dr. Shady Page was done  Reportedly no major complications-- post op  Zavaleta catheter placed and initialy bloody urine noted, which started clearing +but worsenig over past 24 hours L groin pain and dsyuria -- catheter in and making urine . New onset right inguinal groin and buttock/hip pain which started yesterday. Despite IV pain meds in ER, still he is quite uncomfortable    +CAD, cabg hx, ascending aortic aneurysm,GERD, reuurrent pneumonia/copd  His \"RA\" hx-- last followed up [facility] rheum 8/22:  \"Followed by  Rheum- RF positive at [facility] [place] on 06/23/20 at 50 but  has history of chronic hepatitis B infection. However, he does have MCP synovitis on exam and  high-grade joint space narrowing of both hands on  xray, predominantly at the MCP joints and  chondrocalcinosis, favored to be degenerative in  nature. 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 \"    They felt +RF 2 to chronic hep B  They felt he could have crystal arthropathy/CPPD  They did NOT feel he needed to be on dmards/immunosuppressants: \"Has joint pain with + RF but no synovitis , wonder if deena + RF is secondary to hepatitis B and not true rheumatoid   Keep symptomatic treayment \"      - hx taken from the patient and records    REVIEW OF SYSTEMS:  no fevers, chills, cp, sob, n/v, ha, vision/hearing changes, wt changes, hot/cold flashes, other open skin lesions, diarrhea, constipation, dysuria/hematuria unless noted in HPI. Complete ROS performed with the patient and is otherwise negative.     Past Medical History:      Diagnosis Date and  high-grade joint space narrowing of both hands on  xray, predominantly at the MCP joints and  chondrocalcinosis, favored to be degenerative in  nature. 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 \"    They felt +RF 2 to chronic hep B  They felt he could have crystal arthropathy/CPPD  They did NOT feel he needed to be on dmards/immunosuppressants: \"Has joint pain with + RF but no synovitis , wonder if deena + RF is secondary to hepatitis B and not true rheumatoid   Keep symptomatic treayment \"      CT didn't show pubic rami Fx, hip Fx or synovitis  CT:  \"Multilevel degenerative changes lumbar spine and hip joints. Normal bilateral inguinal regions with no evidence for hernia or adenopathy\"  Although he states he has had pain like this with his \"RA\" prior  I don't recommend a dmard for his groin pain,     possible nsaids, steroids, colchicine if crystal arthitis hip pain flares up- for now one day of toradol and 5 doses colchicine and re eval    Post op - infection or complication NOT ruled out  +dysuria - orange urine - form pyridium  +urinary retention and catheter in now 3 days   UA show moderate bacteria, +nitrite, LE etc..   CT  shows:  Prostatomegaly with diffuse urinary bladder wall thickening  -- UTI, cystitis, prostatitis?   Post op  -continue abx for above    Urology opinion- consult    Tamiko Harper MD   Night Officer, overnight admitting doctor at [facility] call day time doctor   for questions after 7:30am    Covering for [address] Service  If Qs please call [phone]  Electronically signed by Jadyn Bey MD on 6/19/2023 at 12:27 AM

## 2023-06-19 NOTE — ACP (ADVANCE CARE PLANNING)
Advance Care Planning   Healthcare Decision Maker:    Primary Decision Maker: Leilani Patel - [facility] - [phone]    Click here to complete Healthcare Decision Makers including selection of the Healthcare Decision Maker Relationship (ie \"Primary\").

## 2023-06-19 NOTE — CONSULTS
INPATIENT CONSULTATION RECORD FOR  6/19/2023      [facility]  [address] [place], [address] [facility]  [phone]        REASON FOR CONSULTATION:      Right groin and leg pain status post UroLift on 5    HISTORY OF PRESENT ILLNESS:      The patient is a 81 y.o. male patient who presents with right groin and leg pain which started yesterday. The patient did have a UroLift by my partner Dr. Franko Carreon on Friday. Surgical uncomplicated Zavaleta catheter remained within urine. He does that he had something similar to this approximately 6 years ago with his rheumatoid arthritis. I did review his CAT scan and I do not see any abnormalities with your implant. His catheter is functioning fine. He does have some tenderness to the right inguinal canal.  I reviewed previous CAT scan from 2 months ago did not show any changes before or after surgery. He did does have some burning with urination however his catheter is still within the proper positioning. He denies any fevers chills nausea or vomiting. Is been unable to move his right leg since yesterday      Past Medical History:   Diagnosis Date    Acute respiratory failure ([address] 04/23/2022    Aneurysm artery, pulmonary (HCC)     Arthritis     rheumatoid    Asthma 1974    status asthmaticus in 3/2004. Cancer ([address]     basal cell to face    Community acquired pneumonia of left lower lobe of lung     resolved    Deviated septum     S/P repair x 3. Hepatitis B     Hypercholesterolemia     Intolerance to statins. Hypertension     Kidney stone     Restless leg syndrome 03/2004    Negative sleep study for sleep apnea. Positive for restless leg syndrome.     Rheumatoid arthritis(714.0)          Past Surgical History:   Procedure Laterality Date    ADENOIDECTOMY      CARDIAC CATHETERIZATION      CARDIAC VALVE SURGERY  06/16/2020    AVR and repair of ascending aortic aneurysm in [facility] [place]    CARDIOVASCULAR STRESS TEST  1994    Stress-induced ischemia

## 2023-06-19 NOTE — ED NOTES
ED to Inpatient Handoff Report    Notified KENIA that electronic handoff available and patient ready for transport to room 522. Safety Risks: Risk of falls    Patient in Restraints: no    Constant Observer or Patient : no    Telemetry Monitoring Ordered: No          Order to transfer to unit without monitor: YES    Last MEWS: 0 Time completed: 2300    Vitals:    06/18/23 1736 06/18/23 1947 06/18/23 2138 06/18/23 2300   BP: (!) 124/55 (!) 142/87 127/77 136/77   Pulse: 66  70 71   Resp: 16  14 12   Temp: 99.1 °F (37.3 °C)   98.6 °F (37 °C)   TempSrc:    Oral   SpO2: 97%  94% 94%   Weight: 150 lb (68 kg)      Height: 5' 5.5\" (1.664 m)          Opportunity for questions and clarification was provided.        Flip Guevara RN  06/18/23 4058

## 2023-06-19 NOTE — CARE COORDINATION
Social Work:    Transition of care social service met with Mr. Michi Quiroga, his wife Lydia Dueñas, and advised them about social work &  roles, as well as discussed discharge planning. Sly Alvarez is a patient of Dr. Amos Allen and he uses [facility]. He resides independently in a 2-story home with his bedroom & full-bath (tub/shower, walk-in shower, high commode) located upstairs, half bath on the main floor. Sly Alvarez has a nebulizer & enhalers due to asthma but no other durable medical equipment. He has used home care after heart surgery in 2020 and he is active at the [facility] cardiac [facility]hab at [facility]. Sly Alvarez expects to return home upon discharge and is receptive to recommendations closer to discharge. Jodie Quinn have no present discharge needs.     Electronically signed by ZACHARY Dickerson on 6/19/2023 at 2:04 PM

## 2023-06-19 NOTE — PATIENT CARE CONFERENCE
[facility] Quality Flow/Interdisciplinary Rounds Progress Note        Quality Flow Rounds held on June 19, 2023    Disciplines Attending:  Bedside Nurse, , , and Nursing Unit Leadership    Glen Altamirano was admitted on 6/18/2023  5:39 PM    Anticipated Discharge Date:       Disposition:    Dino Score:  Dino Scale Score: 21    Readmission Risk              Risk of Unplanned Readmission:  0           Discussed patient goal for the day, patient clinical progression, and barriers to discharge.   The following Goal(s) of the Day/Commitment(s) have been identified:  Diagnostics - Report Results and Labs - Report Results      Theresa Deng RN  June 19, 2023

## 2023-06-20 PROBLEM — M79.604 RIGHT LEG PAIN: Status: ACTIVE | Noted: 2023-06-20

## 2023-06-20 LAB
ALBUMIN SERPL-MCNC: 3.5 G/DL (ref 3.5–5.2)
ALP SERPL-CCNC: 49 U/L (ref 40–129)
ALT SERPL-CCNC: 12 U/L (ref 0–40)
ANION GAP SERPL CALCULATED.3IONS-SCNC: 12 MMOL/L (ref 7–16)
AST SERPL-CCNC: 16 U/L (ref 0–39)
BASOPHILS # BLD: 0.06 E9/L (ref 0–0.2)
BASOPHILS NFR BLD: 0.6 % (ref 0–2)
BILIRUB SERPL-MCNC: 1.9 MG/DL (ref 0–1.2)
BUN SERPL-MCNC: 15 MG/DL (ref 6–23)
CALCIUM SERPL-MCNC: 9.1 MG/DL (ref 8.6–10.2)
CHLORIDE SERPL-SCNC: 101 MMOL/L (ref 98–107)
CO2 SERPL-SCNC: 23 MMOL/L (ref 22–29)
CREAT SERPL-MCNC: 1 MG/DL (ref 0.7–1.2)
EOSINOPHIL # BLD: 0.63 E9/L (ref 0.05–0.5)
EOSINOPHIL NFR BLD: 6.4 % (ref 0–6)
ERYTHROCYTE [DISTWIDTH] IN BLOOD BY AUTOMATED COUNT: 12.6 FL (ref 11.5–15)
GLUCOSE SERPL-MCNC: 96 MG/DL (ref 74–99)
HCT VFR BLD AUTO: 39.8 % (ref 37–54)
HGB BLD-MCNC: 13.2 G/DL (ref 12.5–16.5)
IMM GRANULOCYTES # BLD: 0.05 E9/L
IMM GRANULOCYTES NFR BLD: 0.5 % (ref 0–5)
LYMPHOCYTES # BLD: 1.48 E9/L (ref 1.5–4)
LYMPHOCYTES NFR BLD: 15.1 % (ref 20–42)
MCH RBC QN AUTO: 32.2 PG (ref 26–35)
MCHC RBC AUTO-ENTMCNC: 33.2 % (ref 32–34.5)
MCV RBC AUTO: 97.1 FL (ref 80–99.9)
MONOCYTES # BLD: 1.07 E9/L (ref 0.1–0.95)
MONOCYTES NFR BLD: 10.9 % (ref 2–12)
NEUTROPHILS # BLD: 6.49 E9/L (ref 1.8–7.3)
NEUTS SEG NFR BLD: 66.5 % (ref 43–80)
PLATELET # BLD AUTO: 125 E9/L (ref 130–450)
PMV BLD AUTO: 10.3 FL (ref 7–12)
POTASSIUM SERPL-SCNC: 4.1 MMOL/L (ref 3.5–5)
PROT SERPL-MCNC: 6.4 G/DL (ref 6.4–8.3)
RBC # BLD AUTO: 4.1 E12/L (ref 3.8–5.8)
SODIUM SERPL-SCNC: 136 MMOL/L (ref 132–146)
WBC # BLD: 9.8 E9/L (ref 4.5–11.5)

## 2023-06-20 PROCEDURE — 99233 SBSQ HOSP IP/OBS HIGH 50: CPT | Performed by: INTERNAL MEDICINE

## 2023-06-20 PROCEDURE — 36415 COLL VENOUS BLD VENIPUNCTURE: CPT

## 2023-06-20 PROCEDURE — 80053 COMPREHEN METABOLIC PANEL: CPT

## 2023-06-20 PROCEDURE — 2580000003 HC RX 258: Performed by: INTERNAL MEDICINE

## 2023-06-20 PROCEDURE — 6360000002 HC RX W HCPCS: Performed by: INTERNAL MEDICINE

## 2023-06-20 PROCEDURE — 96372 THER/PROPH/DIAG INJ SC/IM: CPT

## 2023-06-20 PROCEDURE — 6370000000 HC RX 637 (ALT 250 FOR IP): Performed by: INTERNAL MEDICINE

## 2023-06-20 PROCEDURE — 85025 COMPLETE CBC W/AUTO DIFF WBC: CPT

## 2023-06-20 PROCEDURE — 94640 AIRWAY INHALATION TREATMENT: CPT

## 2023-06-20 PROCEDURE — 1200000000 HC SEMI PRIVATE

## 2023-06-20 PROCEDURE — 96376 TX/PRO/DX INJ SAME DRUG ADON: CPT

## 2023-06-20 PROCEDURE — 99232 SBSQ HOSP IP/OBS MODERATE 35: CPT | Performed by: INTERNAL MEDICINE

## 2023-06-20 RX ORDER — POLYETHYLENE GLYCOL 3350 17 G/17G
17 POWDER, FOR SOLUTION ORAL ONCE
Status: COMPLETED | OUTPATIENT
Start: 2023-06-20 | End: 2023-06-20

## 2023-06-20 RX ORDER — SENNOSIDES A AND B 8.6 MG/1
1 TABLET, FILM COATED ORAL 2 TIMES DAILY
Status: DISCONTINUED | OUTPATIENT
Start: 2023-06-20 | End: 2023-06-22 | Stop reason: HOSPADM

## 2023-06-20 RX ADMIN — POLYETHYLENE GLYCOL 3350 17 G: 17 POWDER, FOR SOLUTION ORAL at 14:37

## 2023-06-20 RX ADMIN — ARFORMOTEROL TARTRATE 15 MCG: 15 SOLUTION RESPIRATORY (INHALATION) at 19:41

## 2023-06-20 RX ADMIN — SENNOSIDES 8.6 MG: 8.6 TABLET, COATED ORAL at 20:37

## 2023-06-20 RX ADMIN — ENOXAPARIN SODIUM 40 MG: 100 INJECTION SUBCUTANEOUS at 08:47

## 2023-06-20 RX ADMIN — COLCHICINE 0.6 MG: 0.6 TABLET ORAL at 20:37

## 2023-06-20 RX ADMIN — COLCHICINE 0.6 MG: 0.6 TABLET ORAL at 08:47

## 2023-06-20 RX ADMIN — MORPHINE SULFATE 4 MG: 4 INJECTION, SOLUTION INTRAMUSCULAR; INTRAVENOUS at 20:47

## 2023-06-20 RX ADMIN — FLUTICASONE PROPIONATE 1 SPRAY: 50 SPRAY, METERED NASAL at 08:48

## 2023-06-20 RX ADMIN — ARFORMOTEROL TARTRATE 15 MCG: 15 SOLUTION RESPIRATORY (INHALATION) at 08:02

## 2023-06-20 RX ADMIN — ASPIRIN 81 MG CHEWABLE TABLET 81 MG: 81 TABLET CHEWABLE at 08:47

## 2023-06-20 RX ADMIN — ATORVASTATIN CALCIUM 10 MG: 10 TABLET, FILM COATED ORAL at 20:37

## 2023-06-20 RX ADMIN — Medication 10 ML: at 20:38

## 2023-06-20 RX ADMIN — BUDESONIDE INHALATION 500 MCG: 0.5 SUSPENSION RESPIRATORY (INHALATION) at 08:02

## 2023-06-20 RX ADMIN — SENNOSIDES 8.6 MG: 8.6 TABLET, COATED ORAL at 14:37

## 2023-06-20 RX ADMIN — MORPHINE SULFATE 4 MG: 4 INJECTION, SOLUTION INTRAMUSCULAR; INTRAVENOUS at 13:49

## 2023-06-20 RX ADMIN — MORPHINE SULFATE 2 MG: 2 INJECTION, SOLUTION INTRAMUSCULAR; INTRAVENOUS at 02:32

## 2023-06-20 RX ADMIN — MORPHINE SULFATE 4 MG: 4 INJECTION, SOLUTION INTRAMUSCULAR; INTRAVENOUS at 17:41

## 2023-06-20 RX ADMIN — BUDESONIDE INHALATION 500 MCG: 0.5 SUSPENSION RESPIRATORY (INHALATION) at 19:42

## 2023-06-20 RX ADMIN — Medication 10 ML: at 08:53

## 2023-06-20 RX ADMIN — MORPHINE SULFATE 4 MG: 4 INJECTION, SOLUTION INTRAMUSCULAR; INTRAVENOUS at 07:00

## 2023-06-20 ASSESSMENT — PAIN DESCRIPTION - DESCRIPTORS
DESCRIPTORS: ACHING;DULL;STABBING
DESCRIPTORS: STABBING;DULL
DESCRIPTORS: DISCOMFORT;SORE;DULL

## 2023-06-20 ASSESSMENT — PAIN DESCRIPTION - LOCATION
LOCATION: GROIN;HIP
LOCATION: HIP;GROIN;LEG
LOCATION: GROIN;HIP

## 2023-06-20 ASSESSMENT — PAIN SCALES - GENERAL
PAINLEVEL_OUTOF10: 10
PAINLEVEL_OUTOF10: 5
PAINLEVEL_OUTOF10: 7

## 2023-06-20 ASSESSMENT — PAIN DESCRIPTION - ORIENTATION
ORIENTATION: RIGHT

## 2023-06-20 NOTE — PROGRESS NOTES
CLINICAL PHARMACY NOTE: MEDS TO BEDS    Total # of Prescriptions Filled: 2   The following medications were delivered to the patient:  Cephalexin 250 mg  Phenazopyrdine 100 mg     Additional Documentation:  Picked up

## 2023-06-20 NOTE — PROGRESS NOTES
Physician Progress Note      PATIENT:               Oneil Dickerson  CSN #:                  [id number]  :                       1950  ADMIT DATE:       2023 5:39 PM  [address] [place] DATE:  Supa Briceño  PROVIDER #:        Celeste Woo MD          QUERY TEXT:    Dear Attending Physician,    Patient admitted with dysuria and right groin and leg pain s/p UroLift   procedure on . Noted documentation of possible UTI in PCP notes. In order   to support the diagnosis of UTI, please include additional clinical indicators   in your documentation. Or please document if the diagnosis of UTI has been   ruled out after further study. The medical record reflects the following:  Risk Factors: recent UroLift procedure on , de anda catheter continued   postop  Clinical Indicators: Per H/P,\". .. post op  De Anda catheter placed and initialy   bloody urine noted, which started clearing +but worsenig over past 24 hours L   groin pain and dsyuria -- catheter in and making urine . Sebastian Matthews Sebastian Matthews Post op - infection   or complication NOT ruled out . Sebastian Matthews Sebastian Matthews \" Per Uro ,\". ..post UroLift implant with   right leg numbness and pain. PLAN:  No acute intervention from urological   services at this time. .. his De Anda catheter until stabilizedThis could be a   flareup of his rheumatoid arthritis which she states he has had previously   with very similar symptoms He will get a voi  Treatment: IVF, IV Rocephin    Thank you,  Duglas Tapia RN CCDS  Clinical Documentation Improvement Specialist  Options provided:  -- UTI present as evidenced by, Please document evidence. -- UTI was ruled out  -- Other - I will add my own diagnosis  -- Disagree - Not applicable / Not valid  -- Disagree - Clinically unable to determine / Unknown  -- Refer to Clinical Documentation Reviewer    PROVIDER RESPONSE TEXT:    UTI was ruled out after study. Query created by: Nia Awad on 2023 4:20 PM      Electronically signed by:   Celeste Woo MD 2023 4:37 PM

## 2023-06-20 NOTE — PROGRESS NOTES
[facility] Progress Note    Admitting Date and Time: 6/18/2023  5:39 PM  Admit Dx: Bilateral renal cysts [N28.1]  Unable to ambulate [R26.2]  Acute cystitis with hematuria [N30.01]    Subjective:  Patient is being followed for Bilateral renal cysts [N28.1]  Unable to ambulate [R26.2]  Acute cystitis with hematuria [N30.01]     Seen at bedside. Zavaleta in place. Continues to complain of weakness and pain. No other complaints. Feels better than yesterday. Tolerating medications. ROS: denies fever, chills, cp, sob, n/v, HA unless stated above.       aspirin  81 mg Oral Daily    atorvastatin  10 mg Oral Nightly    fluticasone  1 spray Each Nostril Daily    budesonide  0.5 mg Nebulization BID    And    arformoterol tartrate  15 mcg Nebulization BID    colchicine  0.6 mg Oral BID    cefTRIAXone (ROCEPHIN) IV  1,000 mg IntraVENous Q24H    sodium chloride flush  5-40 mL IntraVENous 2 times per day    enoxaparin  40 mg SubCUTAneous Daily     albuterol, 2.5 mg, Q6H PRN  phenazopyridine, 100 mg, TID PRN  acetaminophen, 325 mg, Q4H PRN  morphine, 2 mg, Q3H PRN  morphine, 4 mg, Q3H PRN  sodium chloride flush, 5-40 mL, PRN  sodium chloride, , PRN  ondansetron, 4 mg, Q8H PRN   Or  ondansetron, 4 mg, Q6H PRN         Objective:    BP (!) 148/80   Pulse 67   Temp 98.3 °F (36.8 °C) (Oral)   Resp 18   Ht 5' 5.5\" (1.664 m)   Wt 141 lb 1.5 oz (64 kg)   SpO2 95%   BMI 23.12 kg/m²     General Appearance: alert and oriented to person, place and time and in no acute distress  Skin: warm and dry  Head: normocephalic and atraumatic  Eyes: pupils equal, round, and reactive to light, extraocular eye movements intact, conjunctivae normal  Neck: neck supple and non tender without mass   Pulmonary/Chest: clear to auscultation bilaterally- no wheezes, rales or rhonchi, normal air movement, no respiratory distress  Cardiovascular: normal rate, normal S1 and S2 and no carotid bruits  Abdomen: soft, non-tender,

## 2023-06-20 NOTE — PATIENT CARE CONFERENCE
[facility] Quality Flow/Interdisciplinary Rounds Progress Note        Quality Flow Rounds held on June 20, 2023    Disciplines Attending:  Bedside Nurse, , , and Nursing Unit Leadership    Angeline Salazar was admitted on 6/18/2023  5:39 PM    Anticipated Discharge Date:       Disposition:    Dino Score:  Dino Scale Score: 20    Readmission Risk              Risk of Unplanned Readmission:  0           Discussed patient goal for the day, patient clinical progression, and barriers to discharge.   The following Goal(s) of the Day/Commitment(s) have been identified:  Diagnostics - Report Results      Deja Ruff RN  June 20, 2023

## 2023-06-21 LAB
ALBUMIN SERPL-MCNC: 3.6 G/DL (ref 3.5–5.2)
ALP SERPL-CCNC: 51 U/L (ref 40–129)
ALT SERPL-CCNC: 15 U/L (ref 0–40)
ANION GAP SERPL CALCULATED.3IONS-SCNC: 10 MMOL/L (ref 7–16)
AST SERPL-CCNC: 18 U/L (ref 0–39)
BACTERIA UR CULT: NORMAL
BASOPHILS # BLD: 0.06 E9/L (ref 0–0.2)
BASOPHILS NFR BLD: 0.7 % (ref 0–2)
BILIRUB SERPL-MCNC: 1.4 MG/DL (ref 0–1.2)
BUN SERPL-MCNC: 14 MG/DL (ref 6–23)
CALCIUM SERPL-MCNC: 9.3 MG/DL (ref 8.6–10.2)
CHLORIDE SERPL-SCNC: 99 MMOL/L (ref 98–107)
CO2 SERPL-SCNC: 25 MMOL/L (ref 22–29)
CREAT SERPL-MCNC: 0.9 MG/DL (ref 0.7–1.2)
EOSINOPHIL # BLD: 0.86 E9/L (ref 0.05–0.5)
EOSINOPHIL NFR BLD: 9.7 % (ref 0–6)
ERYTHROCYTE [DISTWIDTH] IN BLOOD BY AUTOMATED COUNT: 12.4 FL (ref 11.5–15)
GLUCOSE SERPL-MCNC: 94 MG/DL (ref 74–99)
HCT VFR BLD AUTO: 39.6 % (ref 37–54)
HGB BLD-MCNC: 13 G/DL (ref 12.5–16.5)
IMM GRANULOCYTES # BLD: 0.03 E9/L
IMM GRANULOCYTES NFR BLD: 0.3 % (ref 0–5)
LYMPHOCYTES # BLD: 1.69 E9/L (ref 1.5–4)
LYMPHOCYTES NFR BLD: 19.1 % (ref 20–42)
MCH RBC QN AUTO: 32.1 PG (ref 26–35)
MCHC RBC AUTO-ENTMCNC: 32.8 % (ref 32–34.5)
MCV RBC AUTO: 97.8 FL (ref 80–99.9)
MONOCYTES # BLD: 0.9 E9/L (ref 0.1–0.95)
MONOCYTES NFR BLD: 10.2 % (ref 2–12)
NEUTROPHILS # BLD: 5.31 E9/L (ref 1.8–7.3)
NEUTS SEG NFR BLD: 60 % (ref 43–80)
PLATELET # BLD AUTO: 151 E9/L (ref 130–450)
PMV BLD AUTO: 9.8 FL (ref 7–12)
POTASSIUM SERPL-SCNC: 3.9 MMOL/L (ref 3.5–5)
PROT SERPL-MCNC: 6.6 G/DL (ref 6.4–8.3)
RBC # BLD AUTO: 4.05 E12/L (ref 3.8–5.8)
SODIUM SERPL-SCNC: 134 MMOL/L (ref 132–146)
WBC # BLD: 8.9 E9/L (ref 4.5–11.5)

## 2023-06-21 PROCEDURE — 1200000000 HC SEMI PRIVATE

## 2023-06-21 PROCEDURE — 94640 AIRWAY INHALATION TREATMENT: CPT

## 2023-06-21 PROCEDURE — 6360000002 HC RX W HCPCS: Performed by: INTERNAL MEDICINE

## 2023-06-21 PROCEDURE — 85025 COMPLETE CBC W/AUTO DIFF WBC: CPT

## 2023-06-21 PROCEDURE — 51798 US URINE CAPACITY MEASURE: CPT

## 2023-06-21 PROCEDURE — 99233 SBSQ HOSP IP/OBS HIGH 50: CPT | Performed by: INTERNAL MEDICINE

## 2023-06-21 PROCEDURE — 6370000000 HC RX 637 (ALT 250 FOR IP): Performed by: INTERNAL MEDICINE

## 2023-06-21 PROCEDURE — 80053 COMPREHEN METABOLIC PANEL: CPT

## 2023-06-21 PROCEDURE — 36415 COLL VENOUS BLD VENIPUNCTURE: CPT

## 2023-06-21 PROCEDURE — 2580000003 HC RX 258: Performed by: INTERNAL MEDICINE

## 2023-06-21 RX ORDER — COLCHICINE 0.6 MG/1
0.6 TABLET ORAL 2 TIMES DAILY
Status: DISCONTINUED | OUTPATIENT
Start: 2023-06-21 | End: 2023-06-22 | Stop reason: HOSPADM

## 2023-06-21 RX ORDER — PREDNISONE 20 MG/1
20 TABLET ORAL DAILY
Status: DISCONTINUED | OUTPATIENT
Start: 2023-06-21 | End: 2023-06-22 | Stop reason: HOSPADM

## 2023-06-21 RX ADMIN — BUDESONIDE INHALATION 500 MCG: 0.5 SUSPENSION RESPIRATORY (INHALATION) at 19:49

## 2023-06-21 RX ADMIN — MORPHINE SULFATE 4 MG: 4 INJECTION, SOLUTION INTRAMUSCULAR; INTRAVENOUS at 00:22

## 2023-06-21 RX ADMIN — BUDESONIDE INHALATION 500 MCG: 0.5 SUSPENSION RESPIRATORY (INHALATION) at 06:24

## 2023-06-21 RX ADMIN — Medication 10 ML: at 08:49

## 2023-06-21 RX ADMIN — ARFORMOTEROL TARTRATE 15 MCG: 15 SOLUTION RESPIRATORY (INHALATION) at 19:49

## 2023-06-21 RX ADMIN — SODIUM CHLORIDE, PRESERVATIVE FREE 10 ML: 5 INJECTION INTRAVENOUS at 02:26

## 2023-06-21 RX ADMIN — SENNOSIDES 8.6 MG: 8.6 TABLET, COATED ORAL at 08:49

## 2023-06-21 RX ADMIN — ACETAMINOPHEN 325 MG: 325 TABLET ORAL at 21:57

## 2023-06-21 RX ADMIN — FLUTICASONE PROPIONATE 1 SPRAY: 50 SPRAY, METERED NASAL at 08:49

## 2023-06-21 RX ADMIN — COLCHICINE 0.6 MG: 0.6 TABLET ORAL at 08:49

## 2023-06-21 RX ADMIN — ASPIRIN 81 MG CHEWABLE TABLET 81 MG: 81 TABLET CHEWABLE at 08:49

## 2023-06-21 RX ADMIN — Medication 10 ML: at 21:55

## 2023-06-21 RX ADMIN — COLCHICINE 0.6 MG: 0.6 TABLET ORAL at 21:55

## 2023-06-21 RX ADMIN — PREDNISONE 20 MG: 20 TABLET ORAL at 13:15

## 2023-06-21 RX ADMIN — ENOXAPARIN SODIUM 40 MG: 100 INJECTION SUBCUTANEOUS at 08:49

## 2023-06-21 RX ADMIN — ATORVASTATIN CALCIUM 10 MG: 10 TABLET, FILM COATED ORAL at 21:55

## 2023-06-21 RX ADMIN — ACETAMINOPHEN 325 MG: 325 TABLET ORAL at 08:53

## 2023-06-21 RX ADMIN — ARFORMOTEROL TARTRATE 15 MCG: 15 SOLUTION RESPIRATORY (INHALATION) at 06:24

## 2023-06-21 ASSESSMENT — PAIN DESCRIPTION - DESCRIPTORS
DESCRIPTORS: ACHING
DESCRIPTORS: ACHING
DESCRIPTORS: DISCOMFORT;THROBBING

## 2023-06-21 ASSESSMENT — PAIN SCALES - GENERAL
PAINLEVEL_OUTOF10: 5
PAINLEVEL_OUTOF10: 5
PAINLEVEL_OUTOF10: 7

## 2023-06-21 ASSESSMENT — PAIN DESCRIPTION - LOCATION
LOCATION: GROIN
LOCATION: HIP
LOCATION: HIP;GROIN;KNEE

## 2023-06-21 ASSESSMENT — PAIN DESCRIPTION - ORIENTATION
ORIENTATION: RIGHT
ORIENTATION: RIGHT
ORIENTATION: MID

## 2023-06-21 NOTE — PROGRESS NOTES
[facility], [facility].  [address] [place], [place] [address], [address] [facility]  [phone]  FAX [phone]      CHIEF UROLOGIC COMPLAINT: Leg numbness and lack of motor function after UroLift    HISTORY OF PRESENT ILLNESS:  Patient without new complaints. Leg is improving. Zavaleta catheter draining clear, yellow urine    REVIEW OF SYSTEMS:   CONSTITUTIONAL: negative  HEENT: negative  HEMATOLOGIC: negative  ENDOCRINE: negative  RESPIRATORY: negative  CV: negative  GI: negative  NEURO: As above  ORTHOPEDICS: As above  PSYCHIATRIC: negative  : as above    PAST FAMILY HISTORY:    Family History   Adopted: Yes     PAST SOCIAL HISTORY:    Social History     Socioeconomic History    Marital status:    Tobacco Use    Smoking status: Former     Packs/day: 2.50     Years: 2.00     Pack years: 5.00     Types: Cigarettes     Start date:      Quit date: 1975     Years since quittin.5    Smokeless tobacco: Never    Tobacco comments:     Quit in . 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   Was on prednisone for 40 years has been off for 7 years    Macular degeneration with loss of vision left eye-----sees   eye care    Coronary artery disease sees Dr. Dimple Correa at [facility]  [facility]    Aortic valve surgery with repair of PFO 2020 [facility]  [facility]    Hepatitis B from his work [facility]enter[facility]    Kidney stones    Restless leg syndrome    Rheumatoid arthritis [facility]    Ultrasound

## 2023-06-21 NOTE — PLAN OF CARE
Problem: Safety - Adult  Goal: Free from fall injury  6/21/2023 0543 by Rolando Crowe RN  Outcome: Progressing  6/20/2023 2337 by Mylene Sultana RN  Outcome: Progressing     Problem: Pain  Goal: Verbalizes/displays adequate comfort level or baseline comfort level  6/21/2023 0543 by Rolando Crowe RN  Outcome: Progressing  6/20/2023 2337 by Mylene Sultana RN  Outcome: Progressing     Problem: Chronic Conditions and Co-morbidities  Goal: Patient's chronic conditions and co-morbidity symptoms are monitored and maintained or improved  6/21/2023 0543 by Rolando Crowe RN  Outcome: Progressing  6/20/2023 2337 by Mylene Sultana RN  Outcome: Progressing

## 2023-06-21 NOTE — PROGRESS NOTES
[facility] Progress Note    Admitting Date and Time: 6/18/2023  5:39 PM  Admit Dx: Bilateral renal cysts [N28.1]  Unable to ambulate [R26.2]  Acute cystitis with hematuria [N30.01]  Right leg pain [M79.604]    Subjective:  Patient is being followed for Bilateral renal cysts [N28.1]  Unable to ambulate [R26.2]  Acute cystitis with hematuria [N30.01]  Right leg pain [M79.604]   Pt seen and examined this morning  No acute events overnight  States he is feeling slightly better today, pain is somewhat improved, has slightly better range of motion in his right lower extremity. Noted his right knee starting to swell up  Zavaleta catheter was removed this morning for voiding trials    ROS: denies fever, chills, cp, sob, n/v, HA unless stated above.       colchicine  0.6 mg Oral BID    predniSONE  20 mg Oral Daily    senna  1 tablet Oral BID    aspirin  81 mg Oral Daily    atorvastatin  10 mg Oral Nightly    fluticasone  1 spray Each Nostril Daily    budesonide  0.5 mg Nebulization BID    And    arformoterol tartrate  15 mcg Nebulization BID    sodium chloride flush  5-40 mL IntraVENous 2 times per day    enoxaparin  40 mg SubCUTAneous Daily     albuterol, 2.5 mg, Q6H PRN  phenazopyridine, 100 mg, TID PRN  acetaminophen, 325 mg, Q4H PRN  morphine, 2 mg, Q3H PRN  morphine, 4 mg, Q3H PRN  sodium chloride flush, 5-40 mL, PRN  sodium chloride, , PRN  ondansetron, 4 mg, Q8H PRN   Or  ondansetron, 4 mg, Q6H PRN         Objective:    BP (!) 137/52   Pulse 62   Temp 97.7 °F (36.5 °C) (Oral)   Resp 16   Ht 5' 5.5\" (1.664 m)   Wt 141 lb (64 kg)   SpO2 96%   BMI 23.11 kg/m²     General Appearance: alert and oriented to person, place and time and in no acute distress  Skin: warm and dry  Head: normocephalic and atraumatic  Eyes: pupils equal, round, and reactive to light, extraocular eye movements intact, conjunctivae normal  Neck: neck supple and non tender without mass   Pulmonary/Chest: clear to auscultation

## 2023-06-21 NOTE — PATIENT CARE CONFERENCE
[facility] Quality Flow/Interdisciplinary Rounds Progress Note        Quality Flow Rounds held on June 21, 2023    Disciplines Attending:  Bedside Nurse, , , and Nursing Unit Leadership    Geovani Maddox was admitted on 6/18/2023  5:39 PM    Anticipated Discharge Date:       Disposition:    Dino Score:  Dino Scale Score: 20    Readmission Risk              Risk of Unplanned Readmission:  13           Discussed patient goal for the day, patient clinical progression, and barriers to discharge.   The following Goal(s) of the Day/Commitment(s) have been identified:  Labs - Report Results      Ld Butts RN  June 21, 2023

## 2023-06-22 VITALS
HEIGHT: 66 IN | DIASTOLIC BLOOD PRESSURE: 87 MMHG | BODY MASS INDEX: 22.68 KG/M2 | TEMPERATURE: 97.5 F | HEART RATE: 62 BPM | OXYGEN SATURATION: 98 % | WEIGHT: 141.09 LBS | RESPIRATION RATE: 16 BRPM | SYSTOLIC BLOOD PRESSURE: 157 MMHG

## 2023-06-22 LAB
ALBUMIN SERPL-MCNC: 3.7 G/DL (ref 3.5–5.2)
ALP SERPL-CCNC: 56 U/L (ref 40–129)
ALT SERPL-CCNC: 23 U/L (ref 0–40)
ANION GAP SERPL CALCULATED.3IONS-SCNC: 12 MMOL/L (ref 7–16)
AST SERPL-CCNC: 31 U/L (ref 0–39)
BASOPHILS # BLD: 0.02 E9/L (ref 0–0.2)
BASOPHILS NFR BLD: 0.3 % (ref 0–2)
BILIRUB SERPL-MCNC: 1 MG/DL (ref 0–1.2)
BUN SERPL-MCNC: 18 MG/DL (ref 6–23)
CALCIUM SERPL-MCNC: 9.6 MG/DL (ref 8.6–10.2)
CHLORIDE SERPL-SCNC: 104 MMOL/L (ref 98–107)
CO2 SERPL-SCNC: 23 MMOL/L (ref 22–29)
CREAT SERPL-MCNC: 0.9 MG/DL (ref 0.7–1.2)
EOSINOPHIL # BLD: 0.04 E9/L (ref 0.05–0.5)
EOSINOPHIL NFR BLD: 0.6 % (ref 0–6)
ERYTHROCYTE [DISTWIDTH] IN BLOOD BY AUTOMATED COUNT: 12.1 FL (ref 11.5–15)
GLUCOSE SERPL-MCNC: 123 MG/DL (ref 74–99)
HCT VFR BLD AUTO: 42.4 % (ref 37–54)
HGB BLD-MCNC: 13.9 G/DL (ref 12.5–16.5)
IMM GRANULOCYTES # BLD: 0.03 E9/L
IMM GRANULOCYTES NFR BLD: 0.4 % (ref 0–5)
LYMPHOCYTES # BLD: 1.08 E9/L (ref 1.5–4)
LYMPHOCYTES NFR BLD: 16.2 % (ref 20–42)
MCH RBC QN AUTO: 32.2 PG (ref 26–35)
MCHC RBC AUTO-ENTMCNC: 32.8 % (ref 32–34.5)
MCV RBC AUTO: 98.1 FL (ref 80–99.9)
MONOCYTES # BLD: 0.55 E9/L (ref 0.1–0.95)
MONOCYTES NFR BLD: 8.2 % (ref 2–12)
NEUTROPHILS # BLD: 4.95 E9/L (ref 1.8–7.3)
NEUTS SEG NFR BLD: 74.3 % (ref 43–80)
PLATELET # BLD AUTO: 178 E9/L (ref 130–450)
PMV BLD AUTO: 10 FL (ref 7–12)
POTASSIUM SERPL-SCNC: 4.4 MMOL/L (ref 3.5–5)
PROT SERPL-MCNC: 6.9 G/DL (ref 6.4–8.3)
RBC # BLD AUTO: 4.32 E12/L (ref 3.8–5.8)
SODIUM SERPL-SCNC: 139 MMOL/L (ref 132–146)
WBC # BLD: 6.7 E9/L (ref 4.5–11.5)

## 2023-06-22 PROCEDURE — 6370000000 HC RX 637 (ALT 250 FOR IP): Performed by: INTERNAL MEDICINE

## 2023-06-22 PROCEDURE — 2580000003 HC RX 258: Performed by: INTERNAL MEDICINE

## 2023-06-22 PROCEDURE — 94640 AIRWAY INHALATION TREATMENT: CPT

## 2023-06-22 PROCEDURE — 36415 COLL VENOUS BLD VENIPUNCTURE: CPT

## 2023-06-22 PROCEDURE — 6360000002 HC RX W HCPCS: Performed by: INTERNAL MEDICINE

## 2023-06-22 PROCEDURE — 51798 US URINE CAPACITY MEASURE: CPT

## 2023-06-22 PROCEDURE — 85025 COMPLETE CBC W/AUTO DIFF WBC: CPT

## 2023-06-22 PROCEDURE — 99239 HOSP IP/OBS DSCHRG MGMT >30: CPT | Performed by: INTERNAL MEDICINE

## 2023-06-22 PROCEDURE — 80053 COMPREHEN METABOLIC PANEL: CPT

## 2023-06-22 RX ORDER — PREDNISONE 5 MG/1
TABLET ORAL
Qty: 22 TABLET | Refills: 0 | Status: SHIPPED | OUTPATIENT
Start: 2023-06-23 | End: 2023-06-28

## 2023-06-22 RX ORDER — COLCHICINE 0.6 MG/1
0.6 TABLET ORAL 2 TIMES DAILY
Qty: 28 TABLET | Refills: 0 | Status: SHIPPED | OUTPATIENT
Start: 2023-06-22 | End: 2023-06-28

## 2023-06-22 RX ADMIN — ACETAMINOPHEN 325 MG: 325 TABLET ORAL at 05:17

## 2023-06-22 RX ADMIN — ENOXAPARIN SODIUM 40 MG: 100 INJECTION SUBCUTANEOUS at 08:33

## 2023-06-22 RX ADMIN — ASPIRIN 81 MG CHEWABLE TABLET 81 MG: 81 TABLET CHEWABLE at 08:33

## 2023-06-22 RX ADMIN — BUDESONIDE INHALATION 500 MCG: 0.5 SUSPENSION RESPIRATORY (INHALATION) at 07:46

## 2023-06-22 RX ADMIN — FLUTICASONE PROPIONATE 1 SPRAY: 50 SPRAY, METERED NASAL at 08:34

## 2023-06-22 RX ADMIN — Medication 10 ML: at 08:33

## 2023-06-22 RX ADMIN — COLCHICINE 0.6 MG: 0.6 TABLET ORAL at 09:54

## 2023-06-22 RX ADMIN — SENNOSIDES 8.6 MG: 8.6 TABLET, COATED ORAL at 08:33

## 2023-06-22 RX ADMIN — ARFORMOTEROL TARTRATE 15 MCG: 15 SOLUTION RESPIRATORY (INHALATION) at 07:46

## 2023-06-22 RX ADMIN — PREDNISONE 20 MG: 20 TABLET ORAL at 08:33

## 2023-06-22 ASSESSMENT — PAIN SCALES - GENERAL: PAINLEVEL_OUTOF10: 5

## 2023-06-22 NOTE — CARE COORDINATION
Social Work:    Mary Aranalaya Bentley accepted Irving's referral and is aware of discharge home today.     Electronically signed by ZACHARY Betancur on 6/22/2023 at 3:46 PM

## 2023-06-22 NOTE — PROGRESS NOTES
We do not accept his insurance. . both scripts transferred to [facility] per patient request

## 2023-06-22 NOTE — DISCHARGE INSTRUCTIONS
[facility] care will be following up with her at home. Call upon discharge to schedule a follow-up visit with Alyssa Yanez/Alex/Pj ([facility]) at [phone]       Take colchicine 0.6 mg twice daily starting tonight. You will receive 14-day supply. If symptoms improve before the end of the 14 days you can stop it. You dont have to complete total 14 days  Continue taking prednisone taper as prescribed starting with 1 mg tomorrow then down to 15mg for 3 days then 10mg for 3 days followed by 5mg for 3 days.

## 2023-06-22 NOTE — DISCHARGE SUMMARY
[facility] Physician Discharge Summary       Maida Peacock [address] [place] [id number]  [phone]    Call in 3 days  As needed    Conor Sullivan MD  [address] [facility] [phone]    Go in 1 day        Activity level: As tolerated     Dispo: home      Condition on discharge: Stable     Patient ID:  Akiko Lawrence  [id number]  81 y.o.  1950    Admit date: 6/18/2023    Discharge date and time:  6/22/2023  1:08 PM    Admission Diagnoses: Principal Problem:    Acute cystitis with hematuria  Active Problems:    Essential hypertension    Paroxysmal atrial fibrillation ([address]    Unable to ambulate    Right leg pain  Resolved Problems:    * No resolved hospital problems. *      Discharge Diagnoses: Principal Problem:    Acute cystitis with hematuria  Active Problems:    Essential hypertension    Paroxysmal atrial fibrillation (HCC)    Unable to ambulate    Right leg pain  Resolved Problems:    * No resolved hospital problems. *      Consults:  IP CONSULT TO UROLOGY  IP CONSULT TO SOCIAL WORK  IP CONSULT TO UROLOGY  IP CONSULT TO IV TEAM  IP CONSULT TO HOME CARE NEEDS    Procedures: none    Hospital Course:   Patient Akiko Lawrence is a 81 y.o. presented with Bilateral renal cysts [N28.1]  Unable to ambulate [R26.2]  Acute cystitis with hematuria [N30.01]  Right leg pain [M79.604]    Patient is a 27-year-old male who was admitted on 6/18 due to right hip pain with restricted ROM in RLE. Concern that is possibly rheumatologic in nature. CT showed degenerative changes in the hip joints, no fractures noted. Patient was started on morphine, and colchicine twice daily. 2 days later prednisone 20 mg was added. Symptoms improved and with much improved range of motion in his right lower extremity today.   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

## 2023-06-22 NOTE — PATIENT CARE CONFERENCE
[facility] Quality Flow/Interdisciplinary Rounds Progress Note        Quality Flow Rounds held on June 22, 2023    Disciplines Attending:  Bedside Nurse, , , and Nursing Unit Leadership    Kaveh Medrano was admitted on 6/18/2023  5:39 PM    Anticipated Discharge Date:       Disposition:    Dino Score:  Dino Scale Score: 20    Readmission Risk              Risk of Unplanned Readmission:  14           Discussed patient goal for the day, patient clinical progression, and barriers to discharge.   The following Goal(s) of the Day/Commitment(s) have been identified:  Discharge - Obtain Order      Aileen Beckett RN  June 22, 2023

## 2023-06-22 NOTE — CARE COORDINATION
Social Work:    Follow-up visit was made today with Ola Scheuermann. Social service discussed possible discharge today and discharge needs. Ola Scheuermann now feels he would like home care nursing and therapy to follow-up at home. He mentioned [facility], however, social work advised Ola Scheuermann that they closed and provided a [facility] list & Ola Scheuermann requested [facility]. Social work called a referral in to [place] [facility] today. Ola Scheuermann advised that he does have a wheeled walker at home. Electronically signed by ZACHARY Rivers on 6/22/2023 at 9:32 AM    The Plan for Transition of Care is related to the following treatment goals: HHC, DME, SNF    The Patient and/or patient representative Courtney Carr  was provided with a choice of provider and agrees with the discharge plan. [x] Yes [] No    Freedom of choice list was provided with basic dialogue that supports the patient's individualized plan of care/goals, treatment preferences and shares the quality data associated with the providers.  [x] Yes [] No    Electronically signed by ZACHARY Rivers on 6/22/2023 at 9:33 AM

## 2023-06-22 NOTE — PROGRESS NOTES
6/22/2023 12:25 PM  Estefania Anderson  [id number]    Subjective:    Ambulating in the room   Voiding comfortably   Most recent PVR was 0ml    Review of Systems  Constitutional: No fever or chills   Respiratory: negative for cough and hemoptysis  Cardiovascular: negative for chest pain and dyspnea  Gastrointestinal: negative for abdominal pain, diarrhea, nausea and vomiting   : See above  Derm: negative for rash and skin lesion(s)  Neurological: negative for seizures and tremors  Musculoskeletal: Negative    Psychiatric: Negative   All other reviews are negative      Scheduled Meds:   colchicine  0.6 mg Oral BID    predniSONE  20 mg Oral Daily    senna  1 tablet Oral BID    aspirin  81 mg Oral Daily    atorvastatin  10 mg Oral Nightly    fluticasone  1 spray Each Nostril Daily    budesonide  0.5 mg Nebulization BID    And    arformoterol tartrate  15 mcg Nebulization BID    sodium chloride flush  5-40 mL IntraVENous 2 times per day    enoxaparin  40 mg SubCUTAneous Daily       Objective:  Vitals:    06/22/23 0754   BP: (!) 157/87   Pulse: 62   Resp: 16   Temp: 97.5 °F (36.4 °C)   SpO2: 98%         Allergies: Statins [statins]    General Appearance: alert and oriented to person, place and time and in no acute distress  Skin: no rash or erythema  Head: normocephalic and atraumatic  Pulmonary/Chest: normal air movement, no respiratory distress  Abdomen: soft, non-tender, non-distended  Genitourinary: no de anda   Extremities: no cyanosis, clubbing or edema         Labs:     Recent Labs     06/22/23  0342      K 4.4      CO2 23   BUN 18   CREATININE 0.9   GLUCOSE 123*   CALCIUM 9.6       Lab Results   Component Value Date/Time    HGB 13.9 06/22/2023 03:42 AM    HCT 42.4 06/22/2023 03:42 AM       Lab Results   Component Value Date    PSA 1.20 01/05/2023    PSA 1.05 02/11/2022    PSA 0.6 08/17/2020         Assessment/Plan:  BPH s/p UroLift on 6/16/23    His de anda was removed yesterday   He is voiding comfortably

## 2023-06-23 ENCOUNTER — CARE COORDINATION (OUTPATIENT)
Dept: CASE MANAGEMENT | Age: 73
End: 2023-06-23

## 2023-06-23 ENCOUNTER — CARE COORDINATION (OUTPATIENT)
Dept: CARE COORDINATION | Age: 73
End: 2023-06-23

## 2023-06-23 DIAGNOSIS — J45.909 ASTHMA, UNSPECIFIED ASTHMA SEVERITY, UNSPECIFIED WHETHER COMPLICATED, UNSPECIFIED WHETHER PERSISTENT: ICD-10-CM

## 2023-06-23 DIAGNOSIS — I10 ESSENTIAL HYPERTENSION: Primary | Chronic | ICD-10-CM

## 2023-06-23 DIAGNOSIS — N30.01 ACUTE CYSTITIS WITH HEMATURIA: Primary | ICD-10-CM

## 2023-06-23 PROCEDURE — 1111F DSCHRG MED/CURRENT MED MERGE: CPT | Performed by: FAMILY MEDICINE

## 2023-06-23 NOTE — PROGRESS NOTES
6/23/23 11:58 AM       Remote Patient Monitoring Treatment Plan    Received request from ACM/CTN [address]   to order remote patient monitoring for in home monitoring of HTN and Asthma and order completed. Patient will be monitoring   --blood pressure   --pulse ox   --weight Please set alert for ONLY weight gain of 5# in 7 days. Pt has no documented hx of HF.    --survey questions. Patient will engage in Remote Patient Monitoring each day to develop the skills necessary for self management. RPM Care Team Responsibilities:   Alerts will be reviewed daily and addressed within 2-4 hours during operational hours (Monday -Friday 9 am-4 pm)  Alert response and intervention documented in patient medical record  Alert response escalated to PCP per protocol and documented in patient medical record  Patient monitored over approximately  days  Discharge from program based on self-management readiness    See care coordination encounters for additional details.      Lydia Cifuentes DNP, FNP-C, Remote Patient Monitoring NP, () [phone]

## 2023-06-23 NOTE — CARE COORDINATION
reviewed, 1111F entered: yes    Was patient discharged with a pulse oximeter? no    Non-face-to-face services provided:  Scheduled appointment with PCP-CTn confirmed with patient he will call PCP office today to schedule HFU appt. CTN will route to [facility]  advising of discharge and the need to schedule 7 day HFU appt with Dr. Tang Vazquez (PCP). Scheduled appointment with Specialist-CTn confirmed with patient he will call today to schedule f/u appt with Rheumatologist at [facility]. Confirmed with patient he is scheduled to f/u with Dr. Bernard Morejon (urology) on 7/26/23 at 1 pm.   Obtained and reviewed discharge summary and/or continuity of care documents  Assessment and support for treatment adherence and medication management-Advised to taper down on Prednisone as directed until completely finished. Offered patient enrollment in the Remote Patient Monitoring (RPM) program for in-home monitoring: Yes, patient enrolled:     Remote Patient Monitoring Enrollment Note      Date/Time: 6/23/2023 10:22 AM    Offered patient enrollment in the [employer] Remote Patient Monitoring (RPM) program for in home monitoring for HTN and Asthma. Patient accepted RPM services. Patient will be monitoring the following daily:  blood pressure heart rate and pulse ox heart rate    CTN reviewed the information below with patient:    Emergency Contact (name and contact number): Renay Ma (wife) [phone]    [x] A member from the care coordination team will reach out to notify the patient once the RPM kit is ordered. [x] Once the kit is delivered, the [facility] team will contact the patient after UPS deliver to assist with set up. [] Determined BP cuff size: regular (9.05\"-15.74\")      [] Determined weight scale: regular (<330lbs)                                                [] Hours of ACM monitoring - Monday-Friday [id number]                     All questions about RPM program answered at this time.   .    Care

## 2023-06-23 NOTE — CARE COORDINATION
Remote Patient Kit Ordering Note      Date/Time:  6/23/2023 11:18 AM      [x] CCSS confirmed patient shipping address  [x] Patient will receive package over the next 1-3 business days. Someone 21 years or older must be present to sign for UPS delivery. [x] HRS will contact patient within 24 hours, an [address] will call the patient directly: If the patient does not answer, HRS will follow up with the clinical team notifying them about the unsuccessful attempt to contact the patient. HRS will make three call attempts to the patient. Provide patient with [facility] Virtual install number is: [phone]. [x] ACM will contact patient once equipment is active to welcome them to the program.                                                         [x] Hours of RPM monitoring - Monday-Friday [id number]; encourage patient to get vitals entered by [facility] each day to have the alert addressed same day. [x][facility] to patient. All questions answered at this time. ACM made aware the RPM kit has been ordered. CCSS notified patient of RPM equipment order.

## 2023-06-26 SDOH — ECONOMIC STABILITY: INCOME INSECURITY: HOW HARD IS IT FOR YOU TO PAY FOR THE VERY BASICS LIKE FOOD, HOUSING, MEDICAL CARE, AND HEATING?: NOT VERY HARD

## 2023-06-26 SDOH — ECONOMIC STABILITY: TRANSPORTATION INSECURITY
IN THE PAST 12 MONTHS, HAS LACK OF TRANSPORTATION KEPT YOU FROM MEETINGS, WORK, OR FROM GETTING THINGS NEEDED FOR DAILY LIVING?: PATIENT DECLINED

## 2023-06-26 SDOH — ECONOMIC STABILITY: FOOD INSECURITY: WITHIN THE PAST 12 MONTHS, THE FOOD YOU BOUGHT JUST DIDN'T LAST AND YOU DIDN'T HAVE MONEY TO GET MORE.: NEVER TRUE

## 2023-06-26 SDOH — ECONOMIC STABILITY: HOUSING INSECURITY
IN THE LAST 12 MONTHS, WAS THERE A TIME WHEN YOU DID NOT HAVE A STEADY PLACE TO SLEEP OR SLEPT IN A SHELTER (INCLUDING NOW)?: NO

## 2023-06-26 SDOH — ECONOMIC STABILITY: FOOD INSECURITY: WITHIN THE PAST 12 MONTHS, YOU WORRIED THAT YOUR FOOD WOULD RUN OUT BEFORE YOU GOT MONEY TO BUY MORE.: NEVER TRUE

## 2023-06-27 ENCOUNTER — OFFICE VISIT (OUTPATIENT)
Dept: PRIMARY CARE CLINIC | Age: 73
End: 2023-06-27

## 2023-06-27 VITALS — HEIGHT: 66 IN | TEMPERATURE: 98.1 F | BODY MASS INDEX: 23.63 KG/M2 | WEIGHT: 147 LBS

## 2023-06-27 DIAGNOSIS — I48.0 PAROXYSMAL ATRIAL FIBRILLATION (HCC): Chronic | ICD-10-CM

## 2023-06-27 DIAGNOSIS — M51.16 HERNIATION OF LUMBAR INTERVERTEBRAL DISC WITH RADICULOPATHY: ICD-10-CM

## 2023-06-27 DIAGNOSIS — N30.00 ACUTE CYSTITIS WITHOUT HEMATURIA: ICD-10-CM

## 2023-06-27 DIAGNOSIS — I10 ESSENTIAL HYPERTENSION: Chronic | ICD-10-CM

## 2023-06-27 DIAGNOSIS — Z09 HOSPITAL DISCHARGE FOLLOW-UP: Primary | ICD-10-CM

## 2023-06-28 ENCOUNTER — TELEPHONE (OUTPATIENT)
Dept: PRIMARY CARE CLINIC | Age: 73
End: 2023-06-28

## 2023-06-28 DIAGNOSIS — M51.16 HERNIATION OF LUMBAR INTERVERTEBRAL DISC WITH RADICULOPATHY: Primary | ICD-10-CM

## 2023-06-29 ENCOUNTER — CARE COORDINATION (OUTPATIENT)
Dept: CASE MANAGEMENT | Age: 73
End: 2023-06-29

## 2023-07-07 ENCOUNTER — CARE COORDINATION (OUTPATIENT)
Dept: CASE MANAGEMENT | Age: 73
End: 2023-07-07

## 2023-07-07 NOTE — CARE COORDINATION
[facility]       Care Transition Nurse reviewed discharge instructions, medical action plan, and red flags with patient and discussed any barriers to care and/or understanding of plan of care after discharge. Discussed appropriate site of care based on symptoms and resources available to patient including: PCP  Specialist  Home health  When to call 911  Condition related references. The patient agrees to contact the PCP office for questions related to their healthcare. Advance Care Planning:   reviewed and current. Patients top risk factors for readmission: medical condition-Asthma, HTN and polypharmacy    Offered patient enrollment in the Remote Patient Monitoring (RPM) program for in-home monitoring: Yes, patient already enrolled. Care Transitions Subsequent and Final Call    Subsequent and Final Calls  Do you have any ongoing symptoms?: No  Have your medications changed?: No  Do you have any questions related to your medications?: No  Do you currently have any active services?: Yes  Are you currently active with any services?: Home Health  Do you have any needs or concerns that I can assist you with?: No  Identified Barriers: None  Care Transitions Interventions     Other Services: Completed (Comment: Patient enrolled in RPM 6/23/23)   Other Interventions:             Care Transition Nurse provided contact information for future needs. No further follow-up call indicated based on severity of symptoms and risk factors. Plan for next call: referral to ambulatory care manager-CTN signing off for Care Transition and will provide a warm handoff to [facility] for Care Coordination as patient is active with RPM for HTN/Asthma.      VAL Pollock

## 2023-07-10 ENCOUNTER — HOSPITAL ENCOUNTER (OUTPATIENT)
Age: 73
Setting detail: OUTPATIENT SURGERY
Discharge: HOME | End: 2023-07-10
Attending: SURGERY | Admitting: SURGERY
Payer: MEDICARE

## 2023-07-10 ENCOUNTER — ANESTHESIA (OUTPATIENT)
Dept: OPERATING ROOM | Age: 73
End: 2023-07-10
Payer: MEDICARE

## 2023-07-10 ENCOUNTER — ANESTHESIA EVENT (OUTPATIENT)
Dept: OPERATING ROOM | Age: 73
End: 2023-07-10
Payer: MEDICARE

## 2023-07-10 VITALS
HEIGHT: 66 IN | BODY MASS INDEX: 23.63 KG/M2 | HEART RATE: 58 BPM | WEIGHT: 147 LBS | SYSTOLIC BLOOD PRESSURE: 151 MMHG | TEMPERATURE: 97.7 F | OXYGEN SATURATION: 96 % | DIASTOLIC BLOOD PRESSURE: 68 MMHG | RESPIRATION RATE: 18 BRPM

## 2023-07-10 DIAGNOSIS — K80.20 SYMPTOMATIC CHOLELITHIASIS: ICD-10-CM

## 2023-07-10 LAB
ANION GAP SERPL CALCULATED.3IONS-SCNC: 11 MMOL/L (ref 7–16)
BUN SERPL-MCNC: 20 MG/DL (ref 6–23)
CALCIUM SERPL-MCNC: 9.4 MG/DL (ref 8.6–10.2)
CHLORIDE SERPL-SCNC: 107 MMOL/L (ref 98–107)
CO2 SERPL-SCNC: 24 MMOL/L (ref 22–29)
CREAT SERPL-MCNC: 1 MG/DL (ref 0.7–1.2)
ERYTHROCYTE [DISTWIDTH] IN BLOOD BY AUTOMATED COUNT: 13.2 FL (ref 11.5–15)
GLUCOSE SERPL-MCNC: 95 MG/DL (ref 74–99)
HCT VFR BLD AUTO: 40.6 % (ref 37–54)
HGB BLD-MCNC: 13.2 G/DL (ref 12.5–16.5)
MCH RBC QN AUTO: 32 PG (ref 26–35)
MCHC RBC AUTO-ENTMCNC: 32.5 % (ref 32–34.5)
MCV RBC AUTO: 98.5 FL (ref 80–99.9)
PLATELET # BLD AUTO: 141 E9/L (ref 130–450)
PMV BLD AUTO: 10.1 FL (ref 7–12)
POTASSIUM SERPL-SCNC: 3.6 MMOL/L (ref 3.5–5)
RBC # BLD AUTO: 4.12 E12/L (ref 3.8–5.8)
SODIUM SERPL-SCNC: 142 MMOL/L (ref 132–146)
WBC # BLD: 8.2 E9/L (ref 4.5–11.5)

## 2023-07-10 PROCEDURE — 2709999900 HC NON-CHARGEABLE SUPPLY: Performed by: SURGERY

## 2023-07-10 PROCEDURE — C9399 UNCLASSIFIED DRUGS OR BIOLOG: HCPCS | Performed by: NURSE ANESTHETIST, CERTIFIED REGISTERED

## 2023-07-10 PROCEDURE — 2580000003 HC RX 258: Performed by: NURSE ANESTHETIST, CERTIFIED REGISTERED

## 2023-07-10 PROCEDURE — 2580000003 HC RX 258: Performed by: SURGERY

## 2023-07-10 PROCEDURE — 85027 COMPLETE CBC AUTOMATED: CPT

## 2023-07-10 PROCEDURE — 7100000011 HC PHASE II RECOVERY - ADDTL 15 MIN: Performed by: SURGERY

## 2023-07-10 PROCEDURE — 7100000010 HC PHASE II RECOVERY - FIRST 15 MIN: Performed by: SURGERY

## 2023-07-10 PROCEDURE — 80048 BASIC METABOLIC PNL TOTAL CA: CPT

## 2023-07-10 PROCEDURE — 2500000003 HC RX 250 WO HCPCS: Performed by: SURGERY

## 2023-07-10 PROCEDURE — 3600000009 HC SURGERY ROBOT BASE: Performed by: SURGERY

## 2023-07-10 PROCEDURE — 6360000002 HC RX W HCPCS: Performed by: SURGERY

## 2023-07-10 PROCEDURE — 6360000002 HC RX W HCPCS: Performed by: ANESTHESIOLOGY

## 2023-07-10 PROCEDURE — 88304 TISSUE EXAM BY PATHOLOGIST: CPT

## 2023-07-10 PROCEDURE — 3700000000 HC ANESTHESIA ATTENDED CARE: Performed by: SURGERY

## 2023-07-10 PROCEDURE — 7100000001 HC PACU RECOVERY - ADDTL 15 MIN: Performed by: SURGERY

## 2023-07-10 PROCEDURE — 47563 LAPARO CHOLECYSTECTOMY/GRAPH: CPT | Performed by: SURGERY

## 2023-07-10 PROCEDURE — 3700000001 HC ADD 15 MINUTES (ANESTHESIA): Performed by: SURGERY

## 2023-07-10 PROCEDURE — S2900 ROBOTIC SURGICAL SYSTEM: HCPCS | Performed by: SURGERY

## 2023-07-10 PROCEDURE — 2500000003 HC RX 250 WO HCPCS: Performed by: NURSE ANESTHETIST, CERTIFIED REGISTERED

## 2023-07-10 PROCEDURE — 3600000019 HC SURGERY ROBOT ADDTL 15MIN: Performed by: SURGERY

## 2023-07-10 PROCEDURE — 6360000002 HC RX W HCPCS: Performed by: NURSE ANESTHETIST, CERTIFIED REGISTERED

## 2023-07-10 PROCEDURE — 36415 COLL VENOUS BLD VENIPUNCTURE: CPT

## 2023-07-10 PROCEDURE — 6370000000 HC RX 637 (ALT 250 FOR IP): Performed by: ANESTHESIOLOGY

## 2023-07-10 PROCEDURE — C1889 IMPLANT/INSERT DEVICE, NOC: HCPCS | Performed by: SURGERY

## 2023-07-10 PROCEDURE — 7100000000 HC PACU RECOVERY - FIRST 15 MIN: Performed by: SURGERY

## 2023-07-10 DEVICE — CLIP INT M L POLYMER LOK LIG HEM O LOK: Type: IMPLANTABLE DEVICE | Status: FUNCTIONAL

## 2023-07-10 RX ORDER — LIDOCAINE HYDROCHLORIDE 20 MG/ML
INJECTION, SOLUTION EPIDURAL; INFILTRATION; INTRACAUDAL; PERINEURAL PRN
Status: DISCONTINUED | OUTPATIENT
Start: 2023-07-10 | End: 2023-07-10 | Stop reason: SDUPTHER

## 2023-07-10 RX ORDER — PROPOFOL 10 MG/ML
INJECTION, EMULSION INTRAVENOUS PRN
Status: DISCONTINUED | OUTPATIENT
Start: 2023-07-10 | End: 2023-07-10 | Stop reason: SDUPTHER

## 2023-07-10 RX ORDER — SODIUM CHLORIDE 9 MG/ML
INJECTION, SOLUTION INTRAVENOUS CONTINUOUS PRN
Status: DISCONTINUED | OUTPATIENT
Start: 2023-07-10 | End: 2023-07-10 | Stop reason: SDUPTHER

## 2023-07-10 RX ORDER — ONDANSETRON 2 MG/ML
4 INJECTION INTRAMUSCULAR; INTRAVENOUS
Status: DISCONTINUED | OUTPATIENT
Start: 2023-07-10 | End: 2023-07-10 | Stop reason: HOSPADM

## 2023-07-10 RX ORDER — OXYCODONE HYDROCHLORIDE AND ACETAMINOPHEN 5; 325 MG/1; MG/1
1 TABLET ORAL ONCE
Status: COMPLETED | OUTPATIENT
Start: 2023-07-10 | End: 2023-07-10

## 2023-07-10 RX ORDER — FENTANYL CITRATE 50 UG/ML
INJECTION, SOLUTION INTRAMUSCULAR; INTRAVENOUS PRN
Status: DISCONTINUED | OUTPATIENT
Start: 2023-07-10 | End: 2023-07-10 | Stop reason: SDUPTHER

## 2023-07-10 RX ORDER — OXYCODONE HYDROCHLORIDE AND ACETAMINOPHEN 5; 325 MG/1; MG/1
1 TABLET ORAL EVERY 6 HOURS PRN
Qty: 8 TABLET | Refills: 0 | Status: SHIPPED | OUTPATIENT
Start: 2023-07-10 | End: 2023-07-12

## 2023-07-10 RX ORDER — SODIUM CHLORIDE 9 MG/ML
INJECTION, SOLUTION INTRAVENOUS PRN
Status: DISCONTINUED | OUTPATIENT
Start: 2023-07-10 | End: 2023-07-10 | Stop reason: HOSPADM

## 2023-07-10 RX ORDER — MEPERIDINE HYDROCHLORIDE 25 MG/ML
12.5 INJECTION INTRAMUSCULAR; INTRAVENOUS; SUBCUTANEOUS EVERY 5 MIN PRN
Status: DISCONTINUED | OUTPATIENT
Start: 2023-07-10 | End: 2023-07-10 | Stop reason: HOSPADM

## 2023-07-10 RX ORDER — HYDRALAZINE HYDROCHLORIDE 20 MG/ML
10 INJECTION INTRAMUSCULAR; INTRAVENOUS
Status: DISCONTINUED | OUTPATIENT
Start: 2023-07-10 | End: 2023-07-10 | Stop reason: HOSPADM

## 2023-07-10 RX ORDER — MIDAZOLAM HYDROCHLORIDE 2 MG/2ML
2 INJECTION, SOLUTION INTRAMUSCULAR; INTRAVENOUS
Status: DISCONTINUED | OUTPATIENT
Start: 2023-07-10 | End: 2023-07-10 | Stop reason: HOSPADM

## 2023-07-10 RX ORDER — SODIUM CHLORIDE 0.9 % (FLUSH) 0.9 %
5-40 SYRINGE (ML) INJECTION EVERY 12 HOURS SCHEDULED
Status: DISCONTINUED | OUTPATIENT
Start: 2023-07-10 | End: 2023-07-10 | Stop reason: HOSPADM

## 2023-07-10 RX ORDER — DEXAMETHASONE SODIUM PHOSPHATE 4 MG/ML
INJECTION, SOLUTION INTRA-ARTICULAR; INTRALESIONAL; INTRAMUSCULAR; INTRAVENOUS; SOFT TISSUE PRN
Status: DISCONTINUED | OUTPATIENT
Start: 2023-07-10 | End: 2023-07-10 | Stop reason: SDUPTHER

## 2023-07-10 RX ORDER — ROCURONIUM BROMIDE 10 MG/ML
INJECTION, SOLUTION INTRAVENOUS PRN
Status: DISCONTINUED | OUTPATIENT
Start: 2023-07-10 | End: 2023-07-10 | Stop reason: SDUPTHER

## 2023-07-10 RX ORDER — IPRATROPIUM BROMIDE AND ALBUTEROL SULFATE 2.5; .5 MG/3ML; MG/3ML
1 SOLUTION RESPIRATORY (INHALATION)
Status: DISCONTINUED | OUTPATIENT
Start: 2023-07-10 | End: 2023-07-10 | Stop reason: HOSPADM

## 2023-07-10 RX ORDER — MIDAZOLAM HYDROCHLORIDE 1 MG/ML
INJECTION INTRAMUSCULAR; INTRAVENOUS PRN
Status: DISCONTINUED | OUTPATIENT
Start: 2023-07-10 | End: 2023-07-10 | Stop reason: SDUPTHER

## 2023-07-10 RX ORDER — SODIUM CHLORIDE 0.9 % (FLUSH) 0.9 %
5-40 SYRINGE (ML) INJECTION PRN
Status: DISCONTINUED | OUTPATIENT
Start: 2023-07-10 | End: 2023-07-10 | Stop reason: HOSPADM

## 2023-07-10 RX ORDER — LABETALOL HYDROCHLORIDE 5 MG/ML
10 INJECTION, SOLUTION INTRAVENOUS
Status: DISCONTINUED | OUTPATIENT
Start: 2023-07-10 | End: 2023-07-10 | Stop reason: HOSPADM

## 2023-07-10 RX ORDER — INDOCYANINE GREEN AND WATER 25 MG
5 KIT INJECTION ONCE
Status: COMPLETED | OUTPATIENT
Start: 2023-07-10 | End: 2023-07-10

## 2023-07-10 RX ORDER — BUPIVACAINE HYDROCHLORIDE AND EPINEPHRINE 2.5; 5 MG/ML; UG/ML
INJECTION, SOLUTION EPIDURAL; INFILTRATION; INTRACAUDAL; PERINEURAL PRN
Status: DISCONTINUED | OUTPATIENT
Start: 2023-07-10 | End: 2023-07-10 | Stop reason: ALTCHOICE

## 2023-07-10 RX ORDER — ONDANSETRON 2 MG/ML
INJECTION INTRAMUSCULAR; INTRAVENOUS PRN
Status: DISCONTINUED | OUTPATIENT
Start: 2023-07-10 | End: 2023-07-10 | Stop reason: SDUPTHER

## 2023-07-10 RX ADMIN — SUGAMMADEX 300 MG: 100 INJECTION, SOLUTION INTRAVENOUS at 10:15

## 2023-07-10 RX ADMIN — HYDROMORPHONE HYDROCHLORIDE 0.5 MG: 0.5 INJECTION, SOLUTION INTRAMUSCULAR; INTRAVENOUS; SUBCUTANEOUS at 10:50

## 2023-07-10 RX ADMIN — ROCURONIUM BROMIDE 40 MG: 10 INJECTION, SOLUTION INTRAVENOUS at 09:34

## 2023-07-10 RX ADMIN — MIDAZOLAM 2 MG: 1 INJECTION INTRAMUSCULAR; INTRAVENOUS at 09:22

## 2023-07-10 RX ADMIN — INDOCYANINE GREEN AND WATER 5 MG: KIT at 08:09

## 2023-07-10 RX ADMIN — SODIUM CHLORIDE: 9 INJECTION, SOLUTION INTRAVENOUS at 09:21

## 2023-07-10 RX ADMIN — WATER 2000 MG: 1 INJECTION INTRAMUSCULAR; INTRAVENOUS; SUBCUTANEOUS at 09:27

## 2023-07-10 RX ADMIN — HYDRALAZINE HYDROCHLORIDE 10 MG: 20 INJECTION INTRAMUSCULAR; INTRAVENOUS at 10:56

## 2023-07-10 RX ADMIN — DEXAMETHASONE SODIUM PHOSPHATE 10 MG: 4 INJECTION, SOLUTION INTRAMUSCULAR; INTRAVENOUS at 09:38

## 2023-07-10 RX ADMIN — LIDOCAINE HYDROCHLORIDE 100 MG: 20 INJECTION, SOLUTION EPIDURAL; INFILTRATION; INTRACAUDAL; PERINEURAL at 09:33

## 2023-07-10 RX ADMIN — FENTANYL CITRATE 50 MCG: 50 INJECTION, SOLUTION INTRAMUSCULAR; INTRAVENOUS at 10:20

## 2023-07-10 RX ADMIN — HYDROMORPHONE HYDROCHLORIDE 0.5 MG: 0.5 INJECTION, SOLUTION INTRAMUSCULAR; INTRAVENOUS; SUBCUTANEOUS at 10:38

## 2023-07-10 RX ADMIN — PROPOFOL 180 MG: 10 INJECTION, EMULSION INTRAVENOUS at 09:33

## 2023-07-10 RX ADMIN — ONDANSETRON 4 MG: 2 INJECTION INTRAMUSCULAR; INTRAVENOUS at 10:06

## 2023-07-10 RX ADMIN — FENTANYL CITRATE 50 MCG: 50 INJECTION, SOLUTION INTRAMUSCULAR; INTRAVENOUS at 09:32

## 2023-07-10 RX ADMIN — OXYCODONE HYDROCHLORIDE AND ACETAMINOPHEN 1 TABLET: 5; 325 TABLET ORAL at 12:21

## 2023-07-10 RX ADMIN — HYDROMORPHONE HYDROCHLORIDE 0.5 MG: 0.5 INJECTION, SOLUTION INTRAMUSCULAR; INTRAVENOUS; SUBCUTANEOUS at 11:04

## 2023-07-10 ASSESSMENT — PAIN DESCRIPTION - LOCATION
LOCATION: ABDOMEN

## 2023-07-10 ASSESSMENT — PAIN - FUNCTIONAL ASSESSMENT
PAIN_FUNCTIONAL_ASSESSMENT: ACTIVITIES ARE NOT PREVENTED
PAIN_FUNCTIONAL_ASSESSMENT: 0-10
PAIN_FUNCTIONAL_ASSESSMENT: ACTIVITIES ARE NOT PREVENTED

## 2023-07-10 ASSESSMENT — PAIN DESCRIPTION - ONSET
ONSET: ON-GOING

## 2023-07-10 ASSESSMENT — PAIN SCALES - GENERAL
PAINLEVEL_OUTOF10: 7
PAINLEVEL_OUTOF10: 7
PAINLEVEL_OUTOF10: 5
PAINLEVEL_OUTOF10: 7
PAINLEVEL_OUTOF10: 5
PAINLEVEL_OUTOF10: 5
PAINLEVEL_OUTOF10: 6
PAINLEVEL_OUTOF10: 6
PAINLEVEL_OUTOF10: 8
PAINLEVEL_OUTOF10: 6
PAINLEVEL_OUTOF10: 6

## 2023-07-10 ASSESSMENT — PAIN DESCRIPTION - PAIN TYPE
TYPE: SURGICAL PAIN

## 2023-07-10 ASSESSMENT — PAIN DESCRIPTION - ORIENTATION
ORIENTATION: LOWER;MID
ORIENTATION: ANTERIOR
ORIENTATION: LOWER;MID
ORIENTATION: ANTERIOR
ORIENTATION: ANTERIOR

## 2023-07-10 ASSESSMENT — PAIN DESCRIPTION - DESCRIPTORS
DESCRIPTORS: DISCOMFORT

## 2023-07-10 ASSESSMENT — PAIN DESCRIPTION - FREQUENCY
FREQUENCY: CONTINUOUS

## 2023-07-10 ASSESSMENT — LIFESTYLE VARIABLES: SMOKING_STATUS: 0

## 2023-07-10 NOTE — H&P
[facility] Note     Assessment/Plan:        Diagnosis Orders   1. Symptomatic cholelithiasis        We will plan for cholecystectomy. We will get risk stratification from his cardiologist.                Return for Surgery. Chief Complaint   Patient presents with    New Patient       Ref from dr Madyson Spence for gallbladder         PCP: Aneta Omer DO     HPI: Elise Herrera is a 81 y.o. male who presents in consultation for cholelithiasis. He said the episode about 2 weeks ago where he was having right upper quadrant pain there is radiation of his back. He says he was sick to his stomach. That never happened previously. Last until next day. He had ultrasound that was done and reviewed. That shows cholelithiasis. He has had a CT that was reviewed and was negative for any GI etiology. His indirect bilirubin was elevated but LFTs teas otherwise and lipase were unremarkable. He does have a UroLift prostate procedure coming up in June he states. Past Medical History        Past Medical History:   Diagnosis Date    Acute respiratory failure ([address]) 4/23/2022    Aneurysm artery, pulmonary (HCC)      Arthritis       rheumatoid    Asthma 1974     status asthmaticus in 3/2004. Cancer ([address])       basal cell to face    Community acquired pneumonia of left lower lobe of lung      Deviated septum       S/P repair x 3. Hepatitis B      Hypercholesterolemia       Intolerance to statins. Hypertension      Kidney stone      Restless leg syndrome 3/2004     Negative sleep study for sleep apnea. Positive for restless leg syndrome.     Rheumatoid arthritis(714.0)              Past Surgical History         Past Surgical History:   Procedure Laterality Date    ADENOIDECTOMY        CARDIAC CATHETERIZATION        CARDIAC VALVE SURGERY   06/16/2020     AVR and repair of ascending aortic aneurysm in [facility] [employer]    CARDIOVASCULAR STRESS TEST   1994     Stress-induced ischemia involving the

## 2023-07-10 NOTE — OP NOTE
visualization. All ports were placed under visualization without any injury the abdominal contents. The robot was then brought up and docked to the patient. The camera and working instruments were inserted. The gallbladder was identified and grasped and elevated superiorly over the liver. It appeared grossly with mild chronic inflammation. Adhesions and omentum were removed from the gallbladder with blunt dissection. Cautery is used to make incision in peritoneum overlying the gallbladder. This was carried medially and laterally of the gallbladder to free the cystic plate. Firefly and Indocyanine Green Cholangiography was used to aid with structure identification. Blunt dissection and sparse electrocautery were used identify the cystic duct and cystic artery. These were circumferentially dissected along their course. The critical view was obtained. The cystic duct was clipped x3 proximally and x1 distally. This was divided with scissors. Cystic artery was also clipped and divided. Electrocautery and blunt dissection were used to dissect the gallbladder off the liver bed. Hemostasis ensured with electrocautery. Clips were again visualized and remained intact without any evidence of bile or bleeding. Gallbladder was placed in a bag and removed via the LUQ port site and sent for specimen. The port sites were closed with 4-0 suture in subcuticular fashion. Derma bond was applied. Patient tolerated procedure well and was transferred PACU in stable condition. All counts were correct.      Micaela Dobbs MD  07/10/23  10:09 AM

## 2023-07-11 ENCOUNTER — CARE COORDINATION (OUTPATIENT)
Dept: CARE COORDINATION | Age: 73
End: 2023-07-11

## 2023-07-11 NOTE — CARE COORDINATION
-ACM spoke to patient to offer enrollment into Care Coordination program.  -Introduced self, reason for call, and explanation of program.   -Patient accepted. Enrollment planned for Thursday. 7/13.  -Pt reports having gallbladder removed yesterday, 7/10/23 by Dr Nazia Doherty. Pt said he is doing well. Pain is a 1/10 and he has not taken anything for pain. Pt said no nausea or vomiting. Pt said he is urinating well and last BM 7-. Pt said he is drinking a lot of water and he ate some chicken noodle soup. Pt said he has been taking short walks in the yard and resting in between. Pt said no lifting.   -Today's RPM vitals: /70, HR 71; pulse ox 96%, HR 64; weight 145 lbs. -Plan: ACM will reach out to patient at an agreed upon date and time for Care Coordination enrollment.

## 2023-07-12 ENCOUNTER — CARE COORDINATION (OUTPATIENT)
Dept: CASE MANAGEMENT | Age: 73
End: 2023-07-12

## 2023-07-12 NOTE — ANESTHESIA POSTPROCEDURE EVALUATION
Department of Anesthesiology  Postprocedure Note    Patient: Amy Otoole  MRN: [id number]  YOB: 1950  Date of evaluation: 7/12/2023      Procedure Summary     Date: 07/10/23 Room / Location: [facility] [place] 10 / [address] [facility] [facility]    Anesthesia Start: 4411 Anesthesia Stop: 1027    Procedure: LAPAROSCOPIC ROBOTIC XI ASSISTED CHOLECYSTECTOMY Diagnosis:       Symptomatic cholelithiasis      (Symptomatic cholelithiasis [K80.20])    Surgeons: Simon Sarabia MD Responsible Provider: Bebe Dan MD    Anesthesia Type: General ASA Status: 3          Anesthesia Type: General    Sabra Phase I: Sabra Score: 10    Sabra Phase II: Sabra Score: 10      Anesthesia Post Evaluation    Patient location during evaluation: PACU  Patient participation: complete - patient participated  Level of consciousness: awake  Airway patency: patent  Nausea & Vomiting: no nausea and no vomiting  Complications: no  Cardiovascular status: hemodynamically stable  Respiratory status: acceptable  Hydration status: stable

## 2023-07-12 NOTE — CARE COORDINATION
Remote Alert Monitoring Note  Rpm alert to be reviewed by the provider   red alert   blood pressure heart rate (49)   Additional needs to be addressed by PCP: No                    Date/Time:  2023 8:15 AM  Patient Current Location: Home: [address] [place] [id number]  LPN contacted patient by telephone regarding red alert received for blood pressure heart rate (49). Verified patients name and  as identifiers. Background: HTN, Asthma  Refer to 911 immediately if:  Patient unresponsive or unable to provide history  Change in cognition or sudden confusion  Patient unable to respond in complete sentences  Intense chest pain/tightness  Any concern for any clinical emergency  Red Alert: Provider response time of 1 hr required for any red alert requiring intervention  Yellow Alert: Provider response time of 3hr required for any escalated yellow alert    HR Triage  Are you having any Chest Pain? no   Are you having any Shortness of Breath? no   Are you having any dizziness? no   Are you feeling more fatigued or tired than normal? no   Are you having any other health concerns or issues? no     Clinical Interventions: Reviewed and followed up on alerts and treatments-Spoke with patient he states he feels good today denies chest pain, SOB, fatigue dizziness, recheck of BP is now /75/58 no further concerns at present time. Plan/Follow Up: Will continue to review, monitor and address alerts with follow up based on severity of symptoms and risk factors.

## 2023-07-13 ENCOUNTER — CARE COORDINATION (OUTPATIENT)
Dept: CARE COORDINATION | Age: 73
End: 2023-07-13

## 2023-07-13 SDOH — ECONOMIC STABILITY: FOOD INSECURITY: WITHIN THE PAST 12 MONTHS, THE FOOD YOU BOUGHT JUST DIDN'T LAST AND YOU DIDN'T HAVE MONEY TO GET MORE.: NEVER TRUE

## 2023-07-13 SDOH — ECONOMIC STABILITY: TRANSPORTATION INSECURITY
IN THE PAST 12 MONTHS, HAS THE LACK OF TRANSPORTATION KEPT YOU FROM MEDICAL APPOINTMENTS OR FROM GETTING MEDICATIONS?: NO

## 2023-07-13 SDOH — ECONOMIC STABILITY: TRANSPORTATION INSECURITY
IN THE PAST 12 MONTHS, HAS LACK OF TRANSPORTATION KEPT YOU FROM MEETINGS, WORK, OR FROM GETTING THINGS NEEDED FOR DAILY LIVING?: NO

## 2023-07-13 SDOH — ECONOMIC STABILITY: FOOD INSECURITY: WITHIN THE PAST 12 MONTHS, YOU WORRIED THAT YOUR FOOD WOULD RUN OUT BEFORE YOU GOT MONEY TO BUY MORE.: NEVER TRUE

## 2023-07-13 SDOH — HEALTH STABILITY: PHYSICAL HEALTH: ON AVERAGE, HOW MANY MINUTES DO YOU ENGAGE IN EXERCISE AT THIS LEVEL?: 90 MIN

## 2023-07-13 SDOH — HEALTH STABILITY: PHYSICAL HEALTH: ON AVERAGE, HOW MANY DAYS PER WEEK DO YOU ENGAGE IN MODERATE TO STRENUOUS EXERCISE (LIKE A BRISK WALK)?: 5 DAYS

## 2023-07-13 ASSESSMENT — SOCIAL DETERMINANTS OF HEALTH (SDOH)
HOW OFTEN DO YOU GET TOGETHER WITH FRIENDS OR RELATIVES?: ONCE A WEEK
HOW OFTEN DO YOU ATTEND CHURCH OR RELIGIOUS SERVICES?: MORE THAN 4 TIMES PER YEAR
IN A TYPICAL WEEK, HOW MANY TIMES DO YOU TALK ON THE PHONE WITH FAMILY, FRIENDS, OR NEIGHBORS?: MORE THAN THREE TIMES A WEEK
HOW OFTEN DO YOU ATTEND CHURCH OR RELIGIOUS SERVICES?: MORE THAN 4 TIMES PER YEAR
IN A TYPICAL WEEK, HOW MANY TIMES DO YOU TALK ON THE PHONE WITH FAMILY, FRIENDS, OR NEIGHBORS?: MORE THAN THREE TIMES A WEEK
HOW OFTEN DO YOU ATTENT MEETINGS OF THE CLUB OR ORGANIZATION YOU BELONG TO?: NEVER
HOW OFTEN DO YOU GET TOGETHER WITH FRIENDS OR RELATIVES?: ONCE A WEEK
HOW HARD IS IT FOR YOU TO PAY FOR THE VERY BASICS LIKE FOOD, HOUSING, MEDICAL CARE, AND HEATING?: NOT HARD AT ALL
DO YOU BELONG TO ANY CLUBS OR ORGANIZATIONS SUCH AS CHURCH GROUPS UNIONS, FRATERNAL OR ATHLETIC GROUPS, OR SCHOOL GROUPS?: NO

## 2023-07-14 NOTE — TELEPHONE ENCOUNTER
Notify pt mri order in system   if not better soon familia like him to see [facility]  best to see me  few days adter mri done

## 2023-07-25 ENCOUNTER — CARE COORDINATION (OUTPATIENT)
Dept: CASE MANAGEMENT | Age: 73
End: 2023-07-25

## 2023-07-25 ENCOUNTER — OFFICE VISIT (OUTPATIENT)
Dept: SURGERY | Age: 73
End: 2023-07-25

## 2023-07-25 VITALS
WEIGHT: 155 LBS | DIASTOLIC BLOOD PRESSURE: 93 MMHG | HEART RATE: 59 BPM | HEIGHT: 66 IN | BODY MASS INDEX: 24.91 KG/M2 | SYSTOLIC BLOOD PRESSURE: 126 MMHG | TEMPERATURE: 97.3 F

## 2023-07-25 DIAGNOSIS — Z90.49 STATUS POST CHOLECYSTECTOMY: Primary | ICD-10-CM

## 2023-07-25 PROCEDURE — 99024 POSTOP FOLLOW-UP VISIT: CPT | Performed by: SURGERY

## 2023-07-25 NOTE — CARE COORDINATION
Remote Alert Monitoring Note  Rpm alert to be reviewed by the provider   red alert   blood pressure heart rate (46)   Additional needs to be addressed by PCP: No                    Date/Time:  2023 8:08 AM  Patient Current Location: Home: [address] [place] [address]   LPN contacted patient by telephone. Verified patients name and  as identifiers. Background: HTN, Asthma  Refer to 911 immediately if:  Patient unresponsive or unable to provide history  Change in cognition or sudden confusion  Patient unable to respond in complete sentences  Intense chest pain/tightness  Any concern for any clinical emergency  Red Alert: Provider response time of 1 hr required for any red alert requiring intervention  Yellow Alert: Provider response time of 3hr required for any escalated yellow alert    HR Triage  Are you having any Chest Pain? no   Are you having any Shortness of Breath? no   Are you having any dizziness? no   Are you feeling more fatigued or tired than normal? no   Are you having any other health concerns or issues? no     Clinical Interventions: Reviewed and followed up on alerts and treatments-poke with patient he states he is feeling good today he did take all his readings earlier than normal this am he is taking his wife to a lab appointment denies chest pain, SOB, dizziness fatigue, recheck of BP is now WNL with HR at 63 no concerns at present time. Plan/Follow Up: Will continue to review, monitor and address alerts with follow up based on severity of symptoms and risk factors.

## 2023-07-28 ENCOUNTER — CARE COORDINATION (OUTPATIENT)
Dept: CASE MANAGEMENT | Age: 73
End: 2023-07-28

## 2023-07-28 NOTE — CARE COORDINATION
Date/Time:  2023 9:57 AM  LPN attempted to reach patient by telephone regarding red alert weight increase in remote patient monitoring program. Left HIPPA compliant message requesting a return call. Will attempt to reach patient again. Remote Alert Monitoring Note  Rpm alert to be reviewed by the provider   red alert   weight (5 lbs in 7 days)   Additional needs to be addressed by PCP: No                    Date/Time:  2023 10:05 AM  Patient Current Location: Home: [address] [place] [id number]  LPN contacted patient by telephone. Verified patients name and  as identifiers. Background: HT, Asthma  Refer to 911 immediately if:  Patient unresponsive or unable to provide history  Change in cognition or sudden confusion  Patient unable to respond in complete sentences  Intense chest pain/tightness  Any concern for any clinical emergency  Red Alert: Provider response time of 1 hr required for any red alert requiring intervention  Yellow Alert: Provider response time of 3hr required for any escalated yellow alert    Weight Scale Triage  Was your weight obtained upon rising/waking today? yes   Was your weight obtained after voiding and/or use of the bathroom today? yes   Did you weigh yourself in the same amount of clothing today, compared to how you typically do? yes   Was the scale bumped or moved prior to today's weight? no   Is your scale on a flat/hard surface? yes   Did you obtain your weight with shoes on? yes   If yes, is this something you normally do during your daily weights? no   Were you standing up straight on the scale today? yes   Were you leaning on anything while obtaining your weight today? no     Clinical Interventions: Reviewed and followed up on alerts and treatments-Spoke to patient he states he feels good denies chest pain, SOB, dizziness he did have his tennis shoes on today which is not normal will try to weigh the same daily has no concerns   Plan/Follow Up:  Will continue

## 2023-07-29 NOTE — PROGRESS NOTES
[facility] Note    Assessment/Plan:     Diagnosis Orders   1. Status post cholecystectomy      Symptoms improved. Return if symptoms worsen or fail to improve. Chief Complaint   Patient presents with    Post-Op Check     2 week post op sandra verduzco 7/10/23       PCP: Yvonne Mcclain DO    HPI: Amy Otoole is a 81 y.o. male here for follow-up of cholecystectomy. He is doing well. He feels much better than preoperatively. Pathology shows chronic cholecystitis. His bowels are moving okay. Review of Systems   All other systems reviewed and are negative. Past Medical History:   Diagnosis Date    Acute respiratory failure ([address]) 04/23/2022    Aneurysm artery, pulmonary (HCC)     Arthritis     rheumatoid    Asthma 1974    status asthmaticus in 3/2004. Cancer ([address])     basal cell to face    Community acquired pneumonia of left lower lobe of lung     resolved    Deviated septum     S/P repair x 3. Gallbladder disease     Hepatitis B     Hypercholesterolemia     Intolerance to statins. Hypertension     Kidney stone     Restless leg syndrome 03/2004    Negative sleep study for sleep apnea. Positive for restless leg syndrome.     Rheumatoid arthritis(714.0)        Past Surgical History:   Procedure Laterality Date    ADENOIDECTOMY      CARDIAC CATHETERIZATION      CARDIAC VALVE SURGERY  06/16/2020    AVR and repair of ascending aortic aneurysm in [place]; dr. Romel Ortiz ([facility]) yearly    CARDIOVASCULAR STRESS TEST  1994    Stress-induced ischemia involving the interventricular septum    CATARACT REMOVAL Bilateral 2002    CHOLECYSTECTOMY, LAPAROSCOPIC N/A 7/10/2023    LAPAROSCOPIC ROBOTIC XI ASSISTED CHOLECYSTECTOMY performed by Simon Sarabia MD at [facility] N/A 06/16/2023    CYSTOURETHROSCOPY WITH INSERTION OF PERMANENT ADJUSTABLE TRANSPROSTATIC IMPLANT performed by Barney Dakin, MD at [facility] CATH LAB

## 2023-08-02 ENCOUNTER — HOSPITAL ENCOUNTER (OUTPATIENT)
Dept: MRI IMAGING | Age: 73
Discharge: HOME | End: 2023-08-04
Payer: MEDICARE

## 2023-08-02 DIAGNOSIS — M51.16 HERNIATION OF LUMBAR INTERVERTEBRAL DISC WITH RADICULOPATHY: ICD-10-CM

## 2023-08-02 PROCEDURE — 72148 MRI LUMBAR SPINE W/O DYE: CPT

## 2023-08-03 ENCOUNTER — TELEPHONE (OUTPATIENT)
Dept: PRIMARY CARE CLINIC | Age: 73
End: 2023-08-03

## 2023-08-03 NOTE — TELEPHONE ENCOUNTER
Notify patient MRI does show a lot of arthritic spurs, narrowing, pressure on the spinal cord and nerve roots. If he is still having significant pain I like for him to be seen at [facility]. If he wants to sit down then we can go over it in person. If not too bad we can discuss it on her next visit on August 21.

## 2023-08-04 ENCOUNTER — CARE COORDINATION (OUTPATIENT)
Dept: CARE COORDINATION | Age: 73
End: 2023-08-04

## 2023-08-04 NOTE — CARE COORDINATION
Ambulatory Care Coordination Note  2023    Patient Current Location:  Home: [address] [place] [id number]     ACM contacted the patient by telephone. Verified name and  with patient as identifiers. Provided introduction to self, and explanation of the ACM role. Challenges to be reviewed by the provider   Additional needs identified to be addressed with provider: No  none                 Method of communication with provider: none. ACM: Cherry Malik RN    Offered patient enrollment in the Remote Patient Monitoring (RPM) program for in-home monitoring: Yes, patient already enrolled. ACM contacted patient to follow up on his status regarding RPM, Asthma, HTN for Care Coordination.  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 Pt said he gets injections in his right eye to maintain his vision.   -Pt reports complete medication compliance.   -Pt reports he received education mailed.    -Pt reports back pain and recently having a MRI of lumbar spine. Pt said his pain is 4/10 today. Pt said he is never without back pain or back ache. Pt continues Cardiac Rehab with tolerance. Pt does not take any pain medication, no ice or heating pad. Pt said he just runs warm water on his back in the shower. Pt said PCP recommends referral to [facility]. Pt is waiting for PCP appt on 23 before an appt is made. Gallbladder removed 7-.   -Pt reports he continues to do well.   -Pt reports he saw Gen Surgery, Dr Anish Monahan 2- and all is good. Asthma  -Pt has a pulse ox and nebulizer, albuterol inhaler.   -Pt reports his \"breathing is excellent. \"  -Chencho Fry continues 2x/daily. Pt uses albuterol inhaler and albuterol neb as needed. Pt is speaking in clear complete sentences   -Pt reports no SOB, cough or wheezing.   -Reviewed on Asthma zone tool. HTN  -Pt has a BP monitor, pulse ox, scale.    -[id number] [facility] RPM services continue.   -Today's RPM vital signs: /75, HR 71; pulse

## 2023-08-08 ENCOUNTER — CARE COORDINATION (OUTPATIENT)
Dept: CASE MANAGEMENT | Age: 73
End: 2023-08-08

## 2023-08-08 NOTE — CARE COORDINATION
Date/Time:  8/8/2023 8:53 AM  LPN attempted to reach patient by telephone regarding red alert BP HR 47 in remote patient monitoring program. Left HIPPA compliant message requesting a return call. Will attempt to reach patient again.

## 2023-08-09 PROCEDURE — 9900000038 HC CARDIAC REHAB PHASE 3 - MONTHLY

## 2023-08-14 ENCOUNTER — CARE COORDINATION (OUTPATIENT)
Dept: CASE MANAGEMENT | Age: 73
End: 2023-08-14

## 2023-08-14 NOTE — CARE COORDINATION
Remote Patient Monitoring Note      Date/Time:  2023 3:10 PM  Patient Current Location: Home: [address] [place] [address],[address] [id number]  LPN contacted patient by telephone regarding yellow alert received for no vitals for  days. Verified patients name and  as identifiers. Background: HTN, Asthma  Clinical Interventions:  LPN spoke with patient he states he has been placing vitals daily, and has had no concerns tech support number given to patient he has mobile device and no vitals coming into HRS      Plan/Follow Up: Will continue to review, monitor and address alerts with follow up based on severity of symptoms and risk factors.

## 2023-08-17 ENCOUNTER — CARE COORDINATION (OUTPATIENT)
Dept: CARE COORDINATION | Age: 73
End: 2023-08-17

## 2023-08-17 DIAGNOSIS — Z12.5 PROSTATE CANCER SCREENING: ICD-10-CM

## 2023-08-17 DIAGNOSIS — M81.0 AGE-RELATED OSTEOPOROSIS WITHOUT CURRENT PATHOLOGICAL FRACTURE: ICD-10-CM

## 2023-08-17 DIAGNOSIS — E11.9 DIET-CONTROLLED DIABETES MELLITUS (HCC): ICD-10-CM

## 2023-08-17 DIAGNOSIS — I10 ESSENTIAL HYPERTENSION: Chronic | ICD-10-CM

## 2023-08-17 DIAGNOSIS — M05.79 RHEUMATOID ARTHRITIS INVOLVING MULTIPLE SITES WITH POSITIVE RHEUMATOID FACTOR (HCC): ICD-10-CM

## 2023-08-17 LAB
BILIRUBIN URINE: NEGATIVE
COLOR: YELLOW
GLUCOSE URINE: NEGATIVE MG/DL
KETONES, URINE: NEGATIVE MG/DL
LEUKOCYTE ESTERASE, URINE: ABNORMAL
NITRITE, URINE: NEGATIVE
PH UA: 5.5 (ref 5–9)
PROTEIN UA: NEGATIVE MG/DL
RBC UA: NORMAL /HPF
SPECIFIC GRAVITY UA: >1.03 (ref 1–1.03)
TURBIDITY: CLEAR
URINE HGB: NEGATIVE
UROBILINOGEN, URINE: 0.2 EU/DL (ref 0–1)
WBC UA: NORMAL /HPF

## 2023-08-17 NOTE — CARE COORDINATION
CCSS place call to patient to arrange equipment exchange per HRS IT due to tablet not working. RMA submitted for HRSTAB. New equipment has been ordered. CCSS explained to [facility] arrival, return, and installation of new equipment. Will route to LPN and ACM/CTN for awareness.     Tracking number for new equipment: [id number]    Tracking number for malfunctioned equipment: [id number]

## 2023-08-18 LAB
ABSOLUTE IMMATURE GRANULOCYTE: <0.03 K/UL (ref 0–0.58)
ALBUMIN SERPL-MCNC: 4.7 G/DL (ref 3.5–5.2)
ALP BLD-CCNC: 56 U/L (ref 40–129)
ALT SERPL-CCNC: 25 U/L (ref 0–40)
ANION GAP SERPL CALCULATED.3IONS-SCNC: 18 MMOL/L (ref 7–16)
AST SERPL-CCNC: 33 U/L (ref 0–39)
BASOPHILS ABSOLUTE: 0.09 K/UL (ref 0–0.2)
BASOPHILS RELATIVE PERCENT: 1 % (ref 0–2)
BILIRUB SERPL-MCNC: 1.3 MG/DL (ref 0–1.2)
BUN BLDV-MCNC: 19 MG/DL (ref 6–23)
CALCIUM SERPL-MCNC: 9.9 MG/DL (ref 8.6–10.2)
CHLORIDE BLD-SCNC: 103 MMOL/L (ref 98–107)
CHOLESTEROL: 118 MG/DL
CO2: 22 MMOL/L (ref 22–29)
CREAT SERPL-MCNC: 1.1 MG/DL (ref 0.7–1.2)
EOSINOPHILS ABSOLUTE: 0.6 K/UL (ref 0.05–0.5)
EOSINOPHILS RELATIVE PERCENT: 7 % (ref 0–6)
GFR SERPL CREATININE-BSD FRML MDRD: >60 ML/MIN/1.73M2
GLUCOSE BLD-MCNC: 90 MG/DL (ref 74–99)
HBA1C MFR BLD: 5.2 % (ref 4–5.6)
HCT VFR BLD CALC: 44.4 % (ref 37–54)
HDLC SERPL-MCNC: 51 MG/DL
HEMOGLOBIN: 13.7 G/DL (ref 12.5–16.5)
IMMATURE GRANULOCYTES: 0 % (ref 0–5)
LDL CHOLESTEROL: 59 MG/DL
LYMPHOCYTES ABSOLUTE: 1.61 K/UL (ref 1.5–4)
LYMPHOCYTES RELATIVE PERCENT: 20 % (ref 20–42)
MCH RBC QN AUTO: 31.9 PG (ref 26–35)
MCHC RBC AUTO-ENTMCNC: 30.9 G/DL (ref 32–34.5)
MCV RBC AUTO: 103.5 FL (ref 80–99.9)
MONOCYTES ABSOLUTE: 0.65 K/UL (ref 0.1–0.95)
MONOCYTES RELATIVE PERCENT: 8 % (ref 2–12)
NEUTROPHILS ABSOLUTE: 5.14 K/UL (ref 1.8–7.3)
NEUTROPHILS RELATIVE PERCENT: 63 % (ref 43–80)
PDW BLD-RTO: 13.7 % (ref 11.5–15)
PLATELET # BLD: 146 K/UL (ref 130–450)
PMV BLD AUTO: 10.7 FL (ref 7–12)
POTASSIUM SERPL-SCNC: 4.3 MMOL/L (ref 3.5–5)
PROSTATE SPECIFIC ANTIGEN: 3.17 NG/ML (ref 0–4)
RBC # BLD: 4.29 M/UL (ref 3.8–5.8)
SODIUM BLD-SCNC: 143 MMOL/L (ref 132–146)
TOTAL PROTEIN: 7 G/DL (ref 6.4–8.3)
TRIGL SERPL-MCNC: 42 MG/DL
VITAMIN D 25-HYDROXY: 32.3 NG/ML (ref 30–100)
VLDLC SERPL CALC-MCNC: 8 MG/DL
WBC # BLD: 8.1 K/UL (ref 4.5–11.5)

## 2023-08-20 SDOH — HEALTH STABILITY: PHYSICAL HEALTH: ON AVERAGE, HOW MANY MINUTES DO YOU ENGAGE IN EXERCISE AT THIS LEVEL?: 60 MIN

## 2023-08-20 SDOH — HEALTH STABILITY: PHYSICAL HEALTH: ON AVERAGE, HOW MANY DAYS PER WEEK DO YOU ENGAGE IN MODERATE TO STRENUOUS EXERCISE (LIKE A BRISK WALK)?: 4 DAYS

## 2023-08-20 ASSESSMENT — LIFESTYLE VARIABLES
HOW OFTEN DO YOU HAVE A DRINK CONTAINING ALCOHOL: 98
HOW MANY STANDARD DRINKS CONTAINING ALCOHOL DO YOU HAVE ON A TYPICAL DAY: 98
HOW OFTEN DO YOU HAVE SIX OR MORE DRINKS ON ONE OCCASION: 1
HOW MANY STANDARD DRINKS CONTAINING ALCOHOL DO YOU HAVE ON A TYPICAL DAY: PATIENT DECLINED
HOW OFTEN DO YOU HAVE A DRINK CONTAINING ALCOHOL: PATIENT DECLINED

## 2023-08-20 ASSESSMENT — PATIENT HEALTH QUESTIONNAIRE - PHQ9
2. FEELING DOWN, DEPRESSED OR HOPELESS: 0
SUM OF ALL RESPONSES TO PHQ QUESTIONS 1-9: 0
SUM OF ALL RESPONSES TO PHQ9 QUESTIONS 1 & 2: 0
SUM OF ALL RESPONSES TO PHQ QUESTIONS 1-9: 0
SUM OF ALL RESPONSES TO PHQ QUESTIONS 1-9: 0
1. LITTLE INTEREST OR PLEASURE IN DOING THINGS: 0
SUM OF ALL RESPONSES TO PHQ QUESTIONS 1-9: 0

## 2023-08-21 ENCOUNTER — OFFICE VISIT (OUTPATIENT)
Dept: PRIMARY CARE CLINIC | Age: 73
End: 2023-08-21
Payer: MEDICARE

## 2023-08-21 VITALS
BODY MASS INDEX: 24.49 KG/M2 | DIASTOLIC BLOOD PRESSURE: 75 MMHG | HEIGHT: 65 IN | TEMPERATURE: 97.8 F | WEIGHT: 147 LBS | SYSTOLIC BLOOD PRESSURE: 134 MMHG

## 2023-08-21 DIAGNOSIS — I25.10 ATHEROSCLEROSIS OF NATIVE CORONARY ARTERY OF NATIVE HEART WITHOUT ANGINA PECTORIS: Chronic | ICD-10-CM

## 2023-08-21 DIAGNOSIS — E11.9 DIET-CONTROLLED DIABETES MELLITUS (HCC): ICD-10-CM

## 2023-08-21 DIAGNOSIS — Z00.00 MEDICARE ANNUAL WELLNESS VISIT, SUBSEQUENT: Primary | ICD-10-CM

## 2023-08-21 DIAGNOSIS — M05.79 RHEUMATOID ARTHRITIS INVOLVING MULTIPLE SITES WITH POSITIVE RHEUMATOID FACTOR (HCC): ICD-10-CM

## 2023-08-21 DIAGNOSIS — M81.0 AGE-RELATED OSTEOPOROSIS WITHOUT CURRENT PATHOLOGICAL FRACTURE: ICD-10-CM

## 2023-08-21 DIAGNOSIS — I10 ESSENTIAL HYPERTENSION: Chronic | ICD-10-CM

## 2023-08-21 DIAGNOSIS — E78.2 MIXED HYPERLIPIDEMIA: ICD-10-CM

## 2023-08-21 DIAGNOSIS — M51.16 HERNIATION OF LUMBAR INTERVERTEBRAL DISC WITH RADICULOPATHY: ICD-10-CM

## 2023-08-21 PROBLEM — N30.01 ACUTE CYSTITIS WITH HEMATURIA: Status: RESOLVED | Noted: 2023-06-19 | Resolved: 2023-08-21

## 2023-08-21 PROCEDURE — 3044F HG A1C LEVEL LT 7.0%: CPT | Performed by: FAMILY MEDICINE

## 2023-08-21 PROCEDURE — 1123F ACP DISCUSS/DSCN MKR DOCD: CPT | Performed by: FAMILY MEDICINE

## 2023-08-21 PROCEDURE — G0439 PPPS, SUBSEQ VISIT: HCPCS | Performed by: FAMILY MEDICINE

## 2023-08-21 PROCEDURE — 3017F COLORECTAL CA SCREEN DOC REV: CPT | Performed by: FAMILY MEDICINE

## 2023-08-21 PROCEDURE — 3075F SYST BP GE 130 - 139MM HG: CPT | Performed by: FAMILY MEDICINE

## 2023-08-21 PROCEDURE — 3078F DIAST BP <80 MM HG: CPT | Performed by: FAMILY MEDICINE

## 2023-08-21 SDOH — ECONOMIC STABILITY: INCOME INSECURITY: HOW HARD IS IT FOR YOU TO PAY FOR THE VERY BASICS LIKE FOOD, HOUSING, MEDICAL CARE, AND HEATING?: NOT HARD AT ALL

## 2023-08-21 SDOH — ECONOMIC STABILITY: FOOD INSECURITY: WITHIN THE PAST 12 MONTHS, YOU WORRIED THAT YOUR FOOD WOULD RUN OUT BEFORE YOU GOT MONEY TO BUY MORE.: NEVER TRUE

## 2023-08-21 SDOH — ECONOMIC STABILITY: FOOD INSECURITY: WITHIN THE PAST 12 MONTHS, THE FOOD YOU BOUGHT JUST DIDN'T LAST AND YOU DIDN'T HAVE MONEY TO GET MORE.: NEVER TRUE

## 2023-08-28 ENCOUNTER — HOSPITAL ENCOUNTER (OUTPATIENT)
Dept: CARDIAC REHAB | Age: 73
Discharge: HOME | End: 2023-08-28

## 2023-08-29 ENCOUNTER — CARE COORDINATION (OUTPATIENT)
Dept: CASE MANAGEMENT | Age: 73
End: 2023-08-29

## 2023-08-29 NOTE — CARE COORDINATION
Remote Alert Monitoring Note  Rpm alert to be reviewed by the provider   red alert   blood pressure heart rate (49)   Additional needs to be addressed by N/A: No                 Date/Time:  2023 9:48 AM  Patient Current Location: Home: [address] [place] [phone]  LPN contacted patient by telephone. Verified patients name and  as identifiers. Background: enrolled in RPM for HTN AND ASTHMA     HR Triage  Are you having any Chest Pain? no   Are you having any Shortness of Breath? no   Are you having any dizziness? no   Are you feeling more fatigued or tired than normal? no   Are you having any other health concerns or issues? no      Clinical Interventions: Reviewed and followed up on alerts and treatments-SPOKE TO Abdirashid Blackburn regarding RPM RED ALERT for low heart rate of 49 on blood pressure cuff. Pulse ox reading   59. Pt has no chest pain, dyspnea, dizziness or fatigue. Requested pt recheck his blood pressure/ HR. New reading 149/78 HR 62.   Plan/Follow Up: Will continue to review, monitor and address alerts with follow up based on severity of symptoms and risk factors.

## 2023-08-30 ENCOUNTER — CARE COORDINATION (OUTPATIENT)
Dept: CARE COORDINATION | Age: 73
End: 2023-08-30

## 2023-09-01 VITALS
OXYGEN SATURATION: 97 % | BODY MASS INDEX: 25.05 KG/M2 | HEART RATE: 66 BPM | SYSTOLIC BLOOD PRESSURE: 139 MMHG | DIASTOLIC BLOOD PRESSURE: 83 MMHG | WEIGHT: 148.2 LBS

## 2023-09-05 ENCOUNTER — CARE COORDINATION (OUTPATIENT)
Dept: CARE COORDINATION | Age: 73
End: 2023-09-05

## 2023-09-05 NOTE — CARE COORDINATION
-ACM attempted to reach patient to follow up on his status regarding RPM, Asthma & HTN for Care Coordination, however no answer. -HIPAA compliant VM left introducing self and reason for call.  -Left ACM's contact information, requesting call back.  -Plan:   If no return call, ACM will attempt outreach again.

## 2023-09-08 PROCEDURE — 9900000038 HC CARDIAC REHAB PHASE 3 - MONTHLY

## 2023-09-18 ENCOUNTER — CARE COORDINATION (OUTPATIENT)
Dept: CASE MANAGEMENT | Age: 73
End: 2023-09-18

## 2023-09-18 NOTE — CARE COORDINATION
Remote Alert Monitoring Note  Rpm alert to be reviewed by the provider   red alert   blood pressure heart rate (47)   Additional needs to be addressed by N/A: No                    Date/Time:  2023 9:14 AM  Patient Current Location: Home: [address] [place] [phone]  LPN contacted patient by telephone. Verified patients name and  as identifiers. Background: enrolled in RPM for HTN and Asthma  Refer to 911 immediately if:  Patient unresponsive or unable to provide history  Change in cognition or sudden confusion  Patient unable to respond in complete sentences  Intense chest pain/tightness  Any concern for any clinical emergency  Red Alert: Provider response time of 1 hr required for any red alert requiring intervention  Yellow Alert: Provider response time of 3hr required for any escalated yellow alert    HR Triage  Are you having any Chest Pain? no   Are you having any Shortness of Breath? no   Are you having any dizziness? no   Are you feeling more fatigued or tired than normal? no   Are you having any other health concerns or issues? no      Clinical Interventions: Reviewed and followed up on alerts and treatments-spoke to Delwyn Closs regarding RPM Red alert for low HR. Pt   reports he just got home from going to cardiac rehab this morning. Pt goes to [address] [place] [address] cardiac rehab 3 times a week. Denies chest pain, dyspnea, dizziness or fatigue. Requested pt recheck his blood pressure/ HR. New reading 152/75 HR 50.  Plan/Follow Up: Will continue to review, monitor and address alerts with follow up based on severity of symptoms and risk factors.

## 2023-09-19 ENCOUNTER — CARE COORDINATION (OUTPATIENT)
Dept: CARE COORDINATION | Age: 73
End: 2023-09-19

## 2023-09-19 NOTE — CARE COORDINATION
-ACM's second attempted to reach patient to follow up on his status regarding Asthma, HTN, DM (diet controlled) for Care Coordination, however no answer. -RPM active. -HIPAA compliant VM left introducing self and reason for call.  -Left ACM's contact information, requesting call back.  -Plan:   If no return call, ACM will attempt outreach again.  -Will mail trying to reach letter.

## 2023-09-22 ENCOUNTER — CARE COORDINATION (OUTPATIENT)
Dept: CARE COORDINATION | Age: 73
End: 2023-09-22

## 2023-09-22 NOTE — CARE COORDINATION
Remote Patient Monitoring Note  Date/Time:  2023 10:48 AM  Patient Current Location: Home: [address] [place] [id number]  RN  contacted patient by telephone regarding red alert received for weight increase (147.6 - gain of 3# in last day). Verified patients name and  as identifiers. Background: Pt enrolled in RPM r/t HTN, Asthma  Clinical Interventions: Reviewed and followed up on alerts and treatments-RN spoke to pt regarding weight 147.6 - gain of 3# in last day. Pt reports he was wearing different type of clothing yesterday, which contributed to the alert today. Pt reports feeling well, denies any issues. PT inquired when he can end RPM services. States he is active with cardiac rehab and feels he has been doing RPM long enough. RN will send message to [facility] to alert that pt may like to d/c RPM.      Plan/Follow Up: Will continue to review, monitor and address alerts with follow up based on severity of symptoms and risk factors.

## 2023-09-25 ENCOUNTER — HOSPITAL ENCOUNTER (OUTPATIENT)
Dept: CARDIAC REHAB | Age: 73
Discharge: HOME | End: 2023-09-25

## 2023-09-26 ENCOUNTER — HOSPITAL ENCOUNTER (OUTPATIENT)
Age: 73
Discharge: HOME | End: 2023-09-28

## 2023-09-26 PROCEDURE — 88305 TISSUE EXAM BY PATHOLOGIST: CPT

## 2023-09-27 ENCOUNTER — CARE COORDINATION (OUTPATIENT)
Dept: CARE COORDINATION | Age: 73
End: 2023-09-27

## 2023-09-27 DIAGNOSIS — J45.909 ASTHMA, UNSPECIFIED ASTHMA SEVERITY, UNSPECIFIED WHETHER COMPLICATED, UNSPECIFIED WHETHER PERSISTENT: ICD-10-CM

## 2023-09-27 DIAGNOSIS — I10 ESSENTIAL HYPERTENSION: Primary | Chronic | ICD-10-CM

## 2023-09-27 NOTE — CARE COORDINATION
-ACM's third attempted to reach patient to follow up on his status regarding RPM, Asthma, DM & HTN for Care Coordination after trying to reach letter was mailed last week, however no answer. -HIPAA compliant VM left introducing self and reason for call.  -Left ACM's contact information, requesting call back.  -Plan:   If no return call, ACM will attempt outreach again.

## 2023-09-27 NOTE — CARE COORDINATION
-Patient returned call to [facility]. Ambulatory Care Coordination Note  2023    Patient Current Location:   [place]      ACM contacted the patient by telephone. Verified name and  with patient as identifiers. Provided introduction to self, and explanation of the ACM role. Challenges to be reviewed by the provider   Additional needs identified to be addressed with provider: No  none               Method of communication with provider: none. ACM: Adama Elizalde RN    Offered patient enrollment in the Remote Patient Monitoring (RPM) program for in-home monitoring: Yes, patient already enrolled. -Pt reports he would like too stop RPM. Pt said he monitors his vital sign with his own equipment and his vital signs are monitored 3 days/week at Cardiac rehab. Pt and ACM reviewed RPM vital signs trend which is stable. Pt graduating from RPM today. Will inform RPM Team.     Remote Patient Monitoring Graduation    Date/Time:  2023 12:53 PM  Patient Current Location: [place]  Patient has graduated from the Remote Patient Monitoring program on 2023. RPM goals have been met at this time. Patient has been provided instruction on process to return RPM equipment and RPM has been deactivated. Patient has ACM's contact information for any further questions, concerns, or needs. HRS time stamp. Current Patient Metrics ---- Activity: 60 mins Blood Pressure: 106/71, 89bpm Pulseox: 96%, 88bpm Survey: C Weight: 148.8lbs Note Created at: 2023 12:58 PM ET ---- Time-Spent: 10 minutes 0 seconds      ACM contacted patient to follow up on his status regarding RPM, Asthma, HTN for Care Coordination.  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 Pt said he gets injections in his right eye to maintain his vision.   -Pt reports complete medication compliance. Back pain  -Pt reports back pain is ok and baseline. Pt said he is never without back pain or back ache.    Pt continues Cardiac Rehab 3

## 2023-09-27 NOTE — PROGRESS NOTES
Remote Patient Order Discontinued    Received request from Juan Antonio Veras RN  to discontinue order for remote patient monitoring of HTN and Asthma and order completed.

## 2023-09-27 NOTE — CARE COORDINATION
CCSS placed call to patient to arrange RPM kit  through [facility]. Reviewed with patient how to pack equipment in original packing. Verified patient's availability to schedule UPS  time. UPS  time requested.  Anticipated  date range 2 to 4 business days

## 2023-09-29 LAB — SURGICAL PATHOLOGY REPORT: NORMAL

## 2023-10-02 ENCOUNTER — HOSPITAL ENCOUNTER (OUTPATIENT)
Dept: CARDIAC REHAB | Age: 73
Discharge: HOME | End: 2023-10-02

## 2023-10-02 PROCEDURE — 9900000038 HC CARDIAC REHAB PHASE 3 - MONTHLY

## 2023-10-09 ENCOUNTER — TELEPHONE (OUTPATIENT)
Dept: ADMINISTRATIVE | Age: 73
End: 2023-10-09

## 2023-10-09 NOTE — TELEPHONE ENCOUNTER
Referral from Dr Isha Méndez to Dr Margarita Hoffman for evaluation;  [address] [place] [address]  1/5/2021  Please advise

## 2023-10-09 NOTE — TELEPHONE ENCOUNTER
Spoke with patient. He is not having any active cardiac issues, this a routine F/U. F/U scheduled for 11/24/23 at 11:40 a.m.

## 2023-10-13 ENCOUNTER — CARE COORDINATION (OUTPATIENT)
Dept: CARE COORDINATION | Age: 73
End: 2023-10-13

## 2023-10-13 NOTE — CARE COORDINATION
-ACM attempted to reach patient to follow up on his status regarding Asthma & HTN for Care Coordination, however no answer. -HIPAA compliant VM left introducing self and reason for call.  -Left ACM's contact information, requesting call back.  -Plan:   If no return call, ACM will attempt outreach again.

## 2023-10-13 NOTE — CARE COORDINATION
Ambulatory Care Coordination Note  10/13/2023    Patient Current Location:  Home: [address] [place] [address]     ACM contacted the patient by telephone. Verified name and  with patient as identifiers. Provided introduction to self, and explanation of the ACM role. Challenges to be reviewed by the provider   Additional needs identified to be addressed with provider: No  none               Method of communication with provider: none. ACM: Marina Perez RN    Offered patient enrollment in the Remote Patient Monitoring (RPM) program for in-home monitoring:  Patient graduated from [facility] . -ACM discussed Graduation from [email or web address] [facility] with patient.  -Patient feels he is able to manage his health care needs without further assistance from [employer], and feels he can graduate from Care Coordination. -ACM educated patient that if he needs additional assistance in managing his health care in the future, he can call ACM to re-enroll in Care Coordination. ACM contacted patient to follow up on his status regarding Asthma & HTN for Care Coordination.  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 Pt gets injections in his right eye to maintain his vision.   -Pt reports complete medication compliance.   -Pt reports he is planting garlic today. Back pain  -Pt reports his back pain remains baseline. Pt said he is never without back pain or back ache.   -Pt reports [facility] PT for hip strengthening is completed. Pt continues Cardiac Rehab 3 days/week with tolerance. Pt said he went to cardiac rehab today and then came home and walked 3 miles.   -Pt reports he saw Dr Kristen Hernandez at [facility] and then had one follow up visit. Pt said there is not much that can be done to his back at this point. Pt said he does not have another follow up appt with [facility]. HTN  -Pt has a BP monitor, pulse ox, scale.   -Today's /68, HR 61, pulse ox 96%,  weight 149 lbs.   Pt

## 2023-11-03 PROCEDURE — 9900000038 HC CARDIAC REHAB PHASE 3 - MONTHLY

## 2023-11-27 ENCOUNTER — HOSPITAL ENCOUNTER (OUTPATIENT)
Dept: CARDIAC REHAB | Age: 73
Discharge: HOME | End: 2023-11-27
Payer: MEDICARE

## 2023-11-30 ENCOUNTER — OFFICE VISIT (OUTPATIENT)
Dept: CARDIOLOGY CLINIC | Age: 73
End: 2023-11-30

## 2023-11-30 VITALS
RESPIRATION RATE: 16 BRPM | HEART RATE: 84 BPM | HEIGHT: 65 IN | BODY MASS INDEX: 24.64 KG/M2 | SYSTOLIC BLOOD PRESSURE: 144 MMHG | DIASTOLIC BLOOD PRESSURE: 86 MMHG | WEIGHT: 147.9 LBS

## 2023-11-30 DIAGNOSIS — I10 ESSENTIAL HYPERTENSION: Primary | Chronic | ICD-10-CM

## 2023-11-30 DIAGNOSIS — I48.0 PAF (PAROXYSMAL ATRIAL FIBRILLATION) (HCC): ICD-10-CM

## 2023-11-30 LAB
ANION GAP SERPL CALCULATED.3IONS-SCNC: 15 MMOL/L (ref 7–16)
BUN BLDV-MCNC: 18 MG/DL (ref 6–23)
CALCIUM SERPL-MCNC: 10.1 MG/DL (ref 8.6–10.2)
CHLORIDE BLD-SCNC: 104 MMOL/L (ref 98–107)
CO2: 21 MMOL/L (ref 22–29)
CREAT SERPL-MCNC: 0.9 MG/DL (ref 0.7–1.2)
GFR SERPL CREATININE-BSD FRML MDRD: >60 ML/MIN/1.73M2
GLUCOSE BLD-MCNC: 87 MG/DL (ref 74–99)
MAGNESIUM: 2.4 MG/DL (ref 1.6–2.6)
POTASSIUM SERPL-SCNC: 4 MMOL/L (ref 3.5–5)
SODIUM BLD-SCNC: 140 MMOL/L (ref 132–146)
TSH SERPL DL<=0.05 MIU/L-ACNC: 0.68 UIU/ML (ref 0.27–4.2)

## 2023-11-30 RX ORDER — METOPROLOL SUCCINATE 25 MG/1
25 TABLET, EXTENDED RELEASE ORAL DAILY
Qty: 30 TABLET | Refills: 3 | Status: SHIPPED | OUTPATIENT
Start: 2023-11-30

## 2023-11-30 RX ORDER — ACETAMINOPHEN 325 MG/1
650 TABLET ORAL EVERY 6 HOURS PRN
COMMUNITY

## 2023-11-30 RX ORDER — ACETAMINOPHEN 160 MG
TABLET,DISINTEGRATING ORAL
COMMUNITY

## 2023-12-01 NOTE — PROGRESS NOTES
Lab work drawn from right arm. Patient tolerated procedure well.      Johan Jovel MA
Cardiology    Note: This report was completed using computerized voice recognition software. Every effort has been made to ensure accuracy, however; and invert and computerized transcription errors may be present.

## 2023-12-07 ENCOUNTER — NURSE ONLY (OUTPATIENT)
Dept: CARDIOLOGY CLINIC | Age: 73
End: 2023-12-07
Payer: MEDICARE

## 2023-12-07 ENCOUNTER — TELEPHONE (OUTPATIENT)
Dept: CARDIOLOGY CLINIC | Age: 73
End: 2023-12-07

## 2023-12-07 DIAGNOSIS — I10 ESSENTIAL HYPERTENSION: Primary | Chronic | ICD-10-CM

## 2023-12-07 PROCEDURE — 93000 ELECTROCARDIOGRAM COMPLETE: CPT | Performed by: INTERNAL MEDICINE

## 2023-12-08 NOTE — TELEPHONE ENCOUNTER
Let him know that he is no longer in atrial flutter. Continue his current cardiac medications. Repeat ECG in 1 month.

## 2023-12-08 NOTE — TELEPHONE ENCOUNTER
Patient notified of EKG result and Dr. Harkins Pals recommendation. EKG scheduled for 1/8/24 at 2:00 p.m.

## 2023-12-28 ENCOUNTER — TELEPHONE (OUTPATIENT)
Dept: PRIMARY CARE CLINIC | Age: 73
End: 2023-12-28

## 2023-12-28 RX ORDER — AZITHROMYCIN 250 MG/1
250 TABLET, FILM COATED ORAL SEE ADMIN INSTRUCTIONS
Qty: 6 TABLET | Refills: 0 | Status: SHIPPED | OUTPATIENT
Start: 2023-12-28 | End: 2024-01-02

## 2023-12-28 RX ORDER — PREDNISONE 10 MG/1
10 TABLET ORAL
Qty: 15 TABLET | Refills: 0 | Status: SHIPPED | OUTPATIENT
Start: 2023-12-28

## 2023-12-28 NOTE — TELEPHONE ENCOUNTER
Positive for COVID, c/o cough-green/brown sputum, low grade temp yesterday, not today though. Headache, runny nose, congestion in chest and sinus'. SPO2 is 93%ra rest  What do you want him to do? Pt also wants you to know he's now on Metoprolol and Eliquis.

## 2023-12-28 NOTE — TELEPHONE ENCOUNTER
Notify patient that antibiotic and a very low dose of steroid to the drugstore. He is not a candidate for Paxlovid. Make sure he pushes lots of fluids. .  Over-the-counter zinc and vitamin C. Purchase an oximeter and call if oxygen saturation less than 90%. If any temperature over 102 degree, shortness of breath,  chest pain go to the emergency room. Complete isolation increase fluids and make sure he takes 3 meals a day. If he loses his appetite try to drink protein shakes. If his ox level goes below 80% stays or should go to ER.   Come to express care if not better in a day or 2

## 2024-01-05 ENCOUNTER — OFFICE VISIT (OUTPATIENT)
Dept: PRIMARY CARE CLINIC | Age: 74
End: 2024-01-05

## 2024-01-05 VITALS
DIASTOLIC BLOOD PRESSURE: 72 MMHG | TEMPERATURE: 97.2 F | SYSTOLIC BLOOD PRESSURE: 126 MMHG | HEIGHT: 65 IN | BODY MASS INDEX: 24.66 KG/M2 | OXYGEN SATURATION: 97 % | WEIGHT: 148 LBS | HEART RATE: 68 BPM

## 2024-01-05 DIAGNOSIS — I71.23 ANEURYSM OF DESCENDING THORACIC AORTA WITHOUT RUPTURE (HCC): ICD-10-CM

## 2024-01-05 DIAGNOSIS — B18.1 CHRONIC HEPATITIS B (HCC): ICD-10-CM

## 2024-01-05 DIAGNOSIS — M05.79 RHEUMATOID ARTHRITIS INVOLVING MULTIPLE SITES WITH POSITIVE RHEUMATOID FACTOR (HCC): ICD-10-CM

## 2024-01-05 DIAGNOSIS — U07.1 COVID-19: Primary | ICD-10-CM

## 2024-01-05 DIAGNOSIS — I48.0 PAF (PAROXYSMAL ATRIAL FIBRILLATION) (HCC): ICD-10-CM

## 2024-01-05 DIAGNOSIS — D69.6 THROMBOCYTOPENIA, UNSPECIFIED (HCC): ICD-10-CM

## 2024-01-05 DIAGNOSIS — E11.9 DIET-CONTROLLED DIABETES MELLITUS (HCC): ICD-10-CM

## 2024-01-05 DIAGNOSIS — J44.1 CHRONIC OBSTRUCTIVE PULMONARY DISEASE WITH ACUTE EXACERBATION (HCC): ICD-10-CM

## 2024-01-05 SDOH — ECONOMIC STABILITY: FOOD INSECURITY: WITHIN THE PAST 12 MONTHS, THE FOOD YOU BOUGHT JUST DIDN'T LAST AND YOU DIDN'T HAVE MONEY TO GET MORE.: NEVER TRUE

## 2024-01-05 SDOH — ECONOMIC STABILITY: FOOD INSECURITY: WITHIN THE PAST 12 MONTHS, YOU WORRIED THAT YOUR FOOD WOULD RUN OUT BEFORE YOU GOT MONEY TO BUY MORE.: NEVER TRUE

## 2024-01-05 SDOH — ECONOMIC STABILITY: INCOME INSECURITY: HOW HARD IS IT FOR YOU TO PAY FOR THE VERY BASICS LIKE FOOD, HOUSING, MEDICAL CARE, AND HEATING?: NOT HARD AT ALL

## 2024-01-05 ASSESSMENT — PATIENT HEALTH QUESTIONNAIRE - PHQ9
SUM OF ALL RESPONSES TO PHQ QUESTIONS 1-9: 0
SUM OF ALL RESPONSES TO PHQ9 QUESTIONS 1 & 2: 0
1. LITTLE INTEREST OR PLEASURE IN DOING THINGS: 0
2. FEELING DOWN, DEPRESSED OR HOPELESS: 0
SUM OF ALL RESPONSES TO PHQ QUESTIONS 1-9: 0

## 2024-01-05 ASSESSMENT — ENCOUNTER SYMPTOMS
GASTROINTESTINAL NEGATIVE: 1
COUGH: 1
SHORTNESS OF BREATH: 0
EYES NEGATIVE: 1
ALLERGIC/IMMUNOLOGIC NEGATIVE: 1
RHINORRHEA: 1

## 2024-01-05 NOTE — PROGRESS NOTES
24  Name: Irving Butt    : 1950    Sex: male    Age: 73 y.o.        Subjective:  Chief Complaint: Patient is here for cough     post  covid    Had  covid  pos      12-28  we cll ed in meds  feels fine  now with    sl cough  no  t  s w c h        Review of Systems   Constitutional: Negative.    HENT:  Positive for rhinorrhea.    Eyes: Negative.    Respiratory:  Positive for cough. Negative for shortness of breath.    Cardiovascular: Negative.    Gastrointestinal: Negative.    Endocrine: Negative.    Genitourinary: Negative.    Musculoskeletal: Negative.    Skin: Negative.    Allergic/Immunologic: Negative.    Neurological: Negative.    Hematological: Negative.    Psychiatric/Behavioral: Negative.           Current Outpatient Medications:     predniSONE (DELTASONE) 10 MG tablet, Take 1 tablet by mouth 3 times daily (with meals), Disp: 15 tablet, Rfl: 0    acetaminophen (TYLENOL) 325 MG tablet, Take 2 tablets by mouth every 6 hours as needed for Pain, Disp: , Rfl:     ascorbic acid (VITAMIN C) 1000 MG tablet, Take 1 tablet by mouth daily, Disp: , Rfl:     Cholecalciferol (VITAMIN D3) 50 MCG (2000 UT) CAPS, Take by mouth, Disp: , Rfl:     metoprolol succinate (TOPROL XL) 25 MG extended release tablet, Take 1 tablet by mouth daily, Disp: 30 tablet, Rfl: 3    apixaban (ELIQUIS) 5 MG TABS tablet, Take 1 tablet by mouth 2 times daily, Disp: 60 tablet, Rfl: 5    Albuterol Sulfate (PROAIR HFA IN), Inhale into the lungs as needed, Disp: , Rfl:     vitamin B-12 (CYANOCOBALAMIN) 1000 MCG tablet, Take 1 tablet by mouth daily, Disp: , Rfl:     albuterol (PROVENTIL) (2.5 MG/3ML) 0.083% nebulizer solution, Take 3 mLs by nebulization every 6 hours as needed for Wheezing, Disp: 360 each, Rfl: 3    calcium carbonate 600 MG TABS tablet, Take 1 tablet by mouth 2 times daily as needed, Disp: , Rfl:     mometasone-formoterol (DULERA) 200-5 MCG/ACT inhaler, Inhale 2 puffs into the lungs in the morning and 2 puffs in the

## 2024-01-08 ENCOUNTER — TELEPHONE (OUTPATIENT)
Dept: CARDIOLOGY CLINIC | Age: 74
End: 2024-01-08

## 2024-01-08 ENCOUNTER — NURSE ONLY (OUTPATIENT)
Dept: CARDIOLOGY CLINIC | Age: 74
End: 2024-01-08
Payer: MEDICARE

## 2024-01-08 DIAGNOSIS — I48.0 PAROXYSMAL ATRIAL FIBRILLATION (HCC): Primary | Chronic | ICD-10-CM

## 2024-01-08 PROCEDURE — 93000 ELECTROCARDIOGRAM COMPLETE: CPT | Performed by: INTERNAL MEDICINE

## 2024-01-08 NOTE — TELEPHONE ENCOUNTER
Let him know that he is staying in normal rhythm.  Follow-up in 3 months if he does not already have a follow-up scheduled.

## 2024-01-10 PROCEDURE — 9900000038 HC CARDIAC REHAB PHASE 3 - MONTHLY

## 2024-01-10 RX ORDER — METOPROLOL SUCCINATE 25 MG/1
25 TABLET, EXTENDED RELEASE ORAL DAILY
Qty: 90 TABLET | Refills: 3 | Status: SHIPPED | OUTPATIENT
Start: 2024-01-10

## 2024-01-29 ENCOUNTER — HOSPITAL ENCOUNTER (OUTPATIENT)
Dept: CARDIAC REHAB | Age: 74
Discharge: HOME | End: 2024-01-29
Payer: MEDICARE

## 2024-02-19 DIAGNOSIS — M81.0 AGE-RELATED OSTEOPOROSIS WITHOUT CURRENT PATHOLOGICAL FRACTURE: ICD-10-CM

## 2024-02-19 DIAGNOSIS — E11.9 DIET-CONTROLLED DIABETES MELLITUS (HCC): ICD-10-CM

## 2024-02-19 DIAGNOSIS — I10 ESSENTIAL HYPERTENSION: Chronic | ICD-10-CM

## 2024-02-19 DIAGNOSIS — E78.2 MIXED HYPERLIPIDEMIA: ICD-10-CM

## 2024-02-19 LAB
ABSOLUTE IMMATURE GRANULOCYTE: 0.03 K/UL (ref 0–0.58)
ALBUMIN SERPL-MCNC: 4.5 G/DL (ref 3.5–5.2)
ALP BLD-CCNC: 48 U/L (ref 40–129)
ALT SERPL-CCNC: 33 U/L (ref 0–40)
ANION GAP SERPL CALCULATED.3IONS-SCNC: 20 MMOL/L (ref 7–16)
AST SERPL-CCNC: 38 U/L (ref 0–39)
BASOPHILS ABSOLUTE: 0.06 K/UL (ref 0–0.2)
BASOPHILS RELATIVE PERCENT: 1 % (ref 0–2)
BILIRUB SERPL-MCNC: 1.5 MG/DL (ref 0–1.2)
BILIRUBIN URINE: NEGATIVE
BUN BLDV-MCNC: 21 MG/DL (ref 6–23)
CALCIUM SERPL-MCNC: 9.8 MG/DL (ref 8.6–10.2)
CHLORIDE BLD-SCNC: 107 MMOL/L (ref 98–107)
CHOLESTEROL: 135 MG/DL
CO2: 20 MMOL/L (ref 22–29)
COLOR: YELLOW
COMMENT: NORMAL
CREAT SERPL-MCNC: 1 MG/DL (ref 0.7–1.2)
EOSINOPHILS ABSOLUTE: 0.42 K/UL (ref 0.05–0.5)
EOSINOPHILS RELATIVE PERCENT: 5 % (ref 0–6)
GFR SERPL CREATININE-BSD FRML MDRD: >60 ML/MIN/1.73M2
GLUCOSE BLD-MCNC: 90 MG/DL (ref 74–99)
GLUCOSE URINE: NEGATIVE MG/DL
HBA1C MFR BLD: 5.5 % (ref 4–5.6)
HCT VFR BLD CALC: 42.8 % (ref 37–54)
HDLC SERPL-MCNC: 59 MG/DL
HEMOGLOBIN: 14 G/DL (ref 12.5–16.5)
IMMATURE GRANULOCYTES: 0 % (ref 0–5)
KETONES, URINE: NEGATIVE MG/DL
LDL CHOLESTEROL: 64 MG/DL
LEUKOCYTE ESTERASE, URINE: NEGATIVE
LYMPHOCYTES ABSOLUTE: 2.37 K/UL (ref 1.5–4)
LYMPHOCYTES RELATIVE PERCENT: 29 % (ref 20–42)
MCH RBC QN AUTO: 32.7 PG (ref 26–35)
MCHC RBC AUTO-ENTMCNC: 32.7 G/DL (ref 32–34.5)
MCV RBC AUTO: 100 FL (ref 80–99.9)
MONOCYTES ABSOLUTE: 0.61 K/UL (ref 0.1–0.95)
MONOCYTES RELATIVE PERCENT: 8 % (ref 2–12)
NEUTROPHILS ABSOLUTE: 4.58 K/UL (ref 1.8–7.3)
NEUTROPHILS RELATIVE PERCENT: 57 % (ref 43–80)
NITRITE, URINE: NEGATIVE
PDW BLD-RTO: 13.5 % (ref 11.5–15)
PH UA: 6 (ref 5–9)
PLATELET # BLD: 150 K/UL (ref 130–450)
PMV BLD AUTO: 11 FL (ref 7–12)
POTASSIUM SERPL-SCNC: 4.2 MMOL/L (ref 3.5–5)
PROTEIN UA: NEGATIVE MG/DL
RBC # BLD: 4.28 M/UL (ref 3.8–5.8)
SODIUM BLD-SCNC: 147 MMOL/L (ref 132–146)
SPECIFIC GRAVITY UA: 1.02 (ref 1–1.03)
TOTAL PROTEIN: 6.9 G/DL (ref 6.4–8.3)
TRIGL SERPL-MCNC: 59 MG/DL
TURBIDITY: CLEAR
URINE HGB: NEGATIVE
UROBILINOGEN, URINE: 0.2 EU/DL (ref 0–1)
VITAMIN D 25-HYDROXY: 42.2 NG/ML (ref 30–100)
VLDLC SERPL CALC-MCNC: 12 MG/DL
WBC # BLD: 8.1 K/UL (ref 4.5–11.5)

## 2024-02-20 PROCEDURE — 9900000038 HC CARDIAC REHAB PHASE 3 - MONTHLY

## 2024-02-21 ENCOUNTER — OFFICE VISIT (OUTPATIENT)
Dept: PRIMARY CARE CLINIC | Age: 74
End: 2024-02-21

## 2024-02-21 VITALS
WEIGHT: 147.8 LBS | RESPIRATION RATE: 17 BRPM | BODY MASS INDEX: 24.6 KG/M2 | TEMPERATURE: 97.6 F | HEART RATE: 64 BPM | OXYGEN SATURATION: 98 %

## 2024-02-21 DIAGNOSIS — M05.79 RHEUMATOID ARTHRITIS INVOLVING MULTIPLE SITES WITH POSITIVE RHEUMATOID FACTOR (HCC): ICD-10-CM

## 2024-02-21 DIAGNOSIS — I48.0 PAROXYSMAL ATRIAL FIBRILLATION (HCC): Primary | Chronic | ICD-10-CM

## 2024-02-21 DIAGNOSIS — R41.3 IMPAIRED MEMORY: ICD-10-CM

## 2024-02-21 DIAGNOSIS — J43.8 OTHER EMPHYSEMA (HCC): Chronic | ICD-10-CM

## 2024-02-21 DIAGNOSIS — E53.8 VITAMIN B 12 DEFICIENCY: ICD-10-CM

## 2024-02-21 DIAGNOSIS — E03.9 ACQUIRED HYPOTHYROIDISM: ICD-10-CM

## 2024-02-21 DIAGNOSIS — Z12.5 PROSTATE CANCER SCREENING: ICD-10-CM

## 2024-02-21 DIAGNOSIS — E78.2 MIXED HYPERLIPIDEMIA: ICD-10-CM

## 2024-02-21 DIAGNOSIS — R73.03 PRE-DIABETES: ICD-10-CM

## 2024-02-21 DIAGNOSIS — M81.0 AGE-RELATED OSTEOPOROSIS WITHOUT CURRENT PATHOLOGICAL FRACTURE: ICD-10-CM

## 2024-02-21 NOTE — PROGRESS NOTES
24  Name: Irving Butt    : 1950    Sex: male    Age: 73 y.o.        Subjective:  Chief Complaint: Patient is here for ck  re  6 mock  re  bp chol AF  heart vlave    cad  RA   asthma    DJD   BPH  memory      Pt is concerend about memory and he ays wife sees it too  He wants tested for alz dis  Seiegn dr woody soon  No cp or sob        Review of Systems   Constitutional: Negative.    HENT: Negative.     Eyes: Negative.    Respiratory: Negative.     Cardiovascular: Negative.    Gastrointestinal: Negative.    Endocrine: Negative.    Genitourinary: Negative.    Musculoskeletal: Negative.    Skin: Negative.    Allergic/Immunologic: Negative.    Neurological: Negative.    Hematological: Negative.    Psychiatric/Behavioral: Negative.           Current Outpatient Medications:     metoprolol succinate (TOPROL XL) 25 MG extended release tablet, Take 1 tablet by mouth daily, Disp: 90 tablet, Rfl: 3    apixaban (ELIQUIS) 5 MG TABS tablet, Take 1 tablet by mouth 2 times daily, Disp: 180 tablet, Rfl: 3    acetaminophen (TYLENOL) 325 MG tablet, Take 2 tablets by mouth every 6 hours as needed for Pain, Disp: , Rfl:     ascorbic acid (VITAMIN C) 1000 MG tablet, Take 1 tablet by mouth daily, Disp: , Rfl:     Cholecalciferol (VITAMIN D3) 50 MCG (2000 UT) CAPS, Take by mouth, Disp: , Rfl:     Albuterol Sulfate (PROAIR HFA IN), Inhale into the lungs as needed, Disp: , Rfl:     vitamin B-12 (CYANOCOBALAMIN) 1000 MCG tablet, Take 1 tablet by mouth daily, Disp: , Rfl:     albuterol (PROVENTIL) (2.5 MG/3ML) 0.083% nebulizer solution, Take 3 mLs by nebulization every 6 hours as needed for Wheezing, Disp: 360 each, Rfl: 3    calcium carbonate 600 MG TABS tablet, Take 1 tablet by mouth 2 times daily as needed, Disp: , Rfl:     mometasone-formoterol (DULERA) 200-5 MCG/ACT inhaler, Inhale 2 puffs into the lungs in the morning and 2 puffs in the evening., Disp: , Rfl:     fluticasone (FLONASE) 50 MCG/ACT nasal spray, 1 spray by

## 2024-02-26 ENCOUNTER — HOSPITAL ENCOUNTER (OUTPATIENT)
Dept: CARDIAC REHAB | Age: 74
Discharge: HOME | End: 2024-02-26
Payer: MEDICARE

## 2024-03-04 ENCOUNTER — OFFICE VISIT (OUTPATIENT)
Dept: CARDIOLOGY CLINIC | Age: 74
End: 2024-03-04
Payer: MEDICARE

## 2024-03-04 VITALS
HEIGHT: 65 IN | WEIGHT: 147 LBS | SYSTOLIC BLOOD PRESSURE: 118 MMHG | RESPIRATION RATE: 16 BRPM | DIASTOLIC BLOOD PRESSURE: 70 MMHG | BODY MASS INDEX: 24.49 KG/M2 | HEART RATE: 55 BPM

## 2024-03-04 DIAGNOSIS — I48.0 PAROXYSMAL ATRIAL FIBRILLATION (HCC): Primary | Chronic | ICD-10-CM

## 2024-03-04 DIAGNOSIS — Z95.2 S/P AVR: ICD-10-CM

## 2024-03-04 PROCEDURE — 3074F SYST BP LT 130 MM HG: CPT | Performed by: INTERNAL MEDICINE

## 2024-03-04 PROCEDURE — G8420 CALC BMI NORM PARAMETERS: HCPCS | Performed by: INTERNAL MEDICINE

## 2024-03-04 PROCEDURE — G8427 DOCREV CUR MEDS BY ELIG CLIN: HCPCS | Performed by: INTERNAL MEDICINE

## 2024-03-04 PROCEDURE — 3078F DIAST BP <80 MM HG: CPT | Performed by: INTERNAL MEDICINE

## 2024-03-04 PROCEDURE — 99214 OFFICE O/P EST MOD 30 MIN: CPT | Performed by: INTERNAL MEDICINE

## 2024-03-04 PROCEDURE — G8484 FLU IMMUNIZE NO ADMIN: HCPCS | Performed by: INTERNAL MEDICINE

## 2024-03-04 PROCEDURE — 1036F TOBACCO NON-USER: CPT | Performed by: INTERNAL MEDICINE

## 2024-03-04 PROCEDURE — 1123F ACP DISCUSS/DSCN MKR DOCD: CPT | Performed by: INTERNAL MEDICINE

## 2024-03-04 PROCEDURE — 3017F COLORECTAL CA SCREEN DOC REV: CPT | Performed by: INTERNAL MEDICINE

## 2024-03-04 PROCEDURE — 93000 ELECTROCARDIOGRAM COMPLETE: CPT | Performed by: INTERNAL MEDICINE

## 2024-03-04 NOTE — PROGRESS NOTES
Irving Butt  1950  Date of Service: 3/4/2024    Patient Active Problem List    Diagnosis Date Noted    Diet-controlled diabetes mellitus (HCC) 02/21/2023     Priority: Medium    Impaired memory 02/21/2024    Herniation of lumbar intervertebral disc with radiculopathy 08/21/2023    Right leg pain 06/20/2023    Vitamin B12 deficiency (dietary) anemia 02/18/2022    Vitamin D deficiency 02/18/2022    Thrombocytopenia, unspecified 02/18/2022    Kidney stone 01/28/2022    Gallbladder sludge 01/28/2022    Rheumatoid arthritis involving multiple sites with positive rheumatoid factor (HCC) 01/27/2022    Paroxysmal atrial fibrillation (HCC) 05/09/2021    Chronic hepatitis B (HCC) 11/23/2020    Thoracic aortic aneurysm without rupture (HCC) 02/02/2020    Basal cell carcinoma of skin of face 04/03/2018     Overview Note:     cheek      Coronary atherosclerosis 04/03/2018     Overview Note:     Overview:   was told his 1993 cath showed \"some blockage but not enough\" to intervene (done for CP)      MR (mitral regurgitation) 04/03/2018    GERD (gastroesophageal reflux disease) 04/03/2018    Hyperlipemia 04/03/2018    Macular degeneration 04/03/2018    Asthma 04/03/2018     Overview Note:     Overview:   had been on prednisone for 30 years, recently PCP has told him to temporarily stop prednisone for a trial \"to help prevent side effects\" per pt      AR (aortic regurgitation) 04/03/2018    Pulmonary emphysema (HCC) 11/30/2016    Lumbar spinal stenosis 11/22/2016    Essential hypertension 11/22/2016    Kidney cyst, acquired 11/22/2016    Heart valve disease 06/04/2012       Social History     Socioeconomic History    Marital status:      Spouse name: None    Number of children: None    Years of education: None    Highest education level: None   Tobacco Use    Smoking status: Former     Current packs/day: 0.00     Average packs/day: 2.5 packs/day for 9.9 years (24.9 ttl pk-yrs)     Types: Cigarettes     Start date:

## 2024-03-18 PROCEDURE — 9900000038 HC CARDIAC REHAB PHASE 3 - MONTHLY

## 2024-03-21 ENCOUNTER — TELEPHONE (OUTPATIENT)
Dept: CARDIOLOGY CLINIC | Age: 74
End: 2024-03-21

## 2024-03-21 NOTE — TELEPHONE ENCOUNTER
He typically runs a low heart rate due to the medications that he is on for the atrial fibrillation.  As long as he is not having any symptoms I would leave it alone.  Call if he is having any symptoms.

## 2024-03-21 NOTE — TELEPHONE ENCOUNTER
Patient called stating he saw pulmonology at [facility] yesterday who advised notifying Dr. Begum of his low HR.  BP/P yesterday was 147/80 (47).  HR today is (50) prior any medications.  Scheduled for echo on 5/14/24.

## 2024-03-25 ENCOUNTER — HOSPITAL ENCOUNTER (OUTPATIENT)
Dept: CARDIAC REHAB | Age: 74
Discharge: HOME | End: 2024-03-25

## 2024-04-02 ENCOUNTER — INITIAL CONSULT (OUTPATIENT)
Dept: GERIATRIC MEDICINE | Age: 74
End: 2024-04-02
Payer: MEDICARE

## 2024-04-02 VITALS
TEMPERATURE: 98.6 F | BODY MASS INDEX: 24.11 KG/M2 | RESPIRATION RATE: 16 BRPM | DIASTOLIC BLOOD PRESSURE: 88 MMHG | OXYGEN SATURATION: 97 % | HEART RATE: 62 BPM | WEIGHT: 150 LBS | HEIGHT: 66 IN | SYSTOLIC BLOOD PRESSURE: 154 MMHG

## 2024-04-02 DIAGNOSIS — G31.84 MCI (MILD COGNITIVE IMPAIRMENT): Primary | ICD-10-CM

## 2024-04-02 PROCEDURE — 99213 OFFICE O/P EST LOW 20 MIN: CPT | Performed by: INTERNAL MEDICINE

## 2024-04-02 PROCEDURE — G8428 CUR MEDS NOT DOCUMENT: HCPCS | Performed by: INTERNAL MEDICINE

## 2024-04-02 PROCEDURE — 1123F ACP DISCUSS/DSCN MKR DOCD: CPT | Performed by: INTERNAL MEDICINE

## 2024-04-02 PROCEDURE — 3017F COLORECTAL CA SCREEN DOC REV: CPT | Performed by: INTERNAL MEDICINE

## 2024-04-02 PROCEDURE — 1036F TOBACCO NON-USER: CPT | Performed by: INTERNAL MEDICINE

## 2024-04-02 PROCEDURE — 3078F DIAST BP <80 MM HG: CPT | Performed by: INTERNAL MEDICINE

## 2024-04-02 PROCEDURE — 3077F SYST BP >= 140 MM HG: CPT | Performed by: INTERNAL MEDICINE

## 2024-04-02 PROCEDURE — G8420 CALC BMI NORM PARAMETERS: HCPCS | Performed by: INTERNAL MEDICINE

## 2024-04-02 SDOH — ECONOMIC STABILITY: FOOD INSECURITY: WITHIN THE PAST 12 MONTHS, YOU WORRIED THAT YOUR FOOD WOULD RUN OUT BEFORE YOU GOT MONEY TO BUY MORE.: NEVER TRUE

## 2024-04-02 SDOH — ECONOMIC STABILITY: FOOD INSECURITY: WITHIN THE PAST 12 MONTHS, THE FOOD YOU BOUGHT JUST DIDN'T LAST AND YOU DIDN'T HAVE MONEY TO GET MORE.: NEVER TRUE

## 2024-04-02 SDOH — ECONOMIC STABILITY: INCOME INSECURITY: HOW HARD IS IT FOR YOU TO PAY FOR THE VERY BASICS LIKE FOOD, HOUSING, MEDICAL CARE, AND HEATING?: NOT HARD AT ALL

## 2024-04-02 ASSESSMENT — PATIENT HEALTH QUESTIONNAIRE - PHQ9
SUM OF ALL RESPONSES TO PHQ QUESTIONS 1-9: 0
1. LITTLE INTEREST OR PLEASURE IN DOING THINGS: NOT AT ALL
SUM OF ALL RESPONSES TO PHQ QUESTIONS 1-9: 0
SUM OF ALL RESPONSES TO PHQ QUESTIONS 1-9: 0
2. FEELING DOWN, DEPRESSED OR HOPELESS: NOT AT ALL
SUM OF ALL RESPONSES TO PHQ9 QUESTIONS 1 & 2: 0
SUM OF ALL RESPONSES TO PHQ QUESTIONS 1-9: 0

## 2024-04-02 NOTE — PROGRESS NOTES
CC memory evaluation    Here alone   And may have had attention span issues since CABG in 2020.  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

## 2024-04-19 PROCEDURE — 9900000038 HC CARDIAC REHAB PHASE 3 - MONTHLY

## 2024-04-29 ENCOUNTER — HOSPITAL ENCOUNTER (OUTPATIENT)
Dept: CARDIAC REHAB | Age: 74
Discharge: HOME | End: 2024-04-29
Payer: MEDICARE

## 2024-05-14 ENCOUNTER — HOSPITAL ENCOUNTER (OUTPATIENT)
Dept: CARDIOLOGY | Age: 74
Discharge: HOME | End: 2024-05-16
Attending: INTERNAL MEDICINE
Payer: MEDICARE

## 2024-05-14 VITALS
BODY MASS INDEX: 24.99 KG/M2 | DIASTOLIC BLOOD PRESSURE: 88 MMHG | WEIGHT: 150 LBS | SYSTOLIC BLOOD PRESSURE: 154 MMHG | HEIGHT: 65 IN

## 2024-05-14 DIAGNOSIS — Z95.2 S/P AVR: ICD-10-CM

## 2024-05-14 DIAGNOSIS — I48.0 PAROXYSMAL ATRIAL FIBRILLATION (HCC): Chronic | ICD-10-CM

## 2024-05-14 PROCEDURE — 93306 TTE W/DOPPLER COMPLETE: CPT

## 2024-05-15 ENCOUNTER — HOSPITAL ENCOUNTER (INPATIENT)
Age: 74
LOS: 3 days | Discharge: HOME | End: 2024-05-18
Attending: EMERGENCY MEDICINE | Admitting: STUDENT IN AN ORGANIZED HEALTH CARE EDUCATION/TRAINING PROGRAM
Payer: MEDICARE

## 2024-05-15 ENCOUNTER — APPOINTMENT (OUTPATIENT)
Dept: CT IMAGING | Age: 74
End: 2024-05-15
Payer: MEDICARE

## 2024-05-15 DIAGNOSIS — M48.061 SPINAL STENOSIS OF LUMBAR REGION WITHOUT NEUROGENIC CLAUDICATION: ICD-10-CM

## 2024-05-15 DIAGNOSIS — I48.0 PAROXYSMAL ATRIAL FIBRILLATION (HCC): ICD-10-CM

## 2024-05-15 DIAGNOSIS — R10.9 RIGHT FLANK PAIN: ICD-10-CM

## 2024-05-15 DIAGNOSIS — R00.1 BRADYCARDIA: Primary | ICD-10-CM

## 2024-05-15 DIAGNOSIS — I48.11 LONGSTANDING PERSISTENT ATRIAL FIBRILLATION (HCC): ICD-10-CM

## 2024-05-15 LAB
ALBUMIN SERPL-MCNC: 4.8 G/DL (ref 3.5–5.2)
ALP SERPL-CCNC: 55 U/L (ref 40–129)
ALT SERPL-CCNC: 32 U/L (ref 0–40)
ANION GAP SERPL CALCULATED.3IONS-SCNC: 11 MMOL/L (ref 7–16)
AST SERPL-CCNC: 41 U/L (ref 0–39)
BACTERIA URNS QL MICRO: ABNORMAL
BASOPHILS # BLD: 0.09 K/UL (ref 0–0.2)
BASOPHILS NFR BLD: 1 % (ref 0–2)
BILIRUB SERPL-MCNC: 1.9 MG/DL (ref 0–1.2)
BILIRUB UR QL STRIP: NEGATIVE
BUN SERPL-MCNC: 21 MG/DL (ref 6–23)
CALCIUM SERPL-MCNC: 9.5 MG/DL (ref 8.6–10.2)
CHLORIDE SERPL-SCNC: 105 MMOL/L (ref 98–107)
CLARITY UR: CLEAR
CO2 SERPL-SCNC: 25 MMOL/L (ref 22–29)
COLOR UR: YELLOW
CREAT SERPL-MCNC: 1 MG/DL (ref 0.7–1.2)
D-DIMER QUANTITATIVE: 918 NG/ML DDU (ref 0–230)
ECHO AO ASC DIAM: 2.9 CM
ECHO AO ASCENDING AORTA INDEX: 1.66 CM/M2
ECHO AV AREA PEAK VELOCITY: 1.9 CM2
ECHO AV AREA VTI: 1.7 CM2
ECHO AV AREA/BSA PEAK VELOCITY: 1.1 CM2/M2
ECHO AV AREA/BSA VTI: 1 CM2/M2
ECHO AV CUSP MM: 1.8 CM
ECHO AV MEAN GRADIENT: 7 MMHG
ECHO AV MEAN VELOCITY: 1.2 M/S
ECHO AV PEAK GRADIENT: 15 MMHG
ECHO AV PEAK VELOCITY: 1.9 M/S
ECHO AV VELOCITY RATIO: 0.47
ECHO AV VTI: 41.3 CM
ECHO BSA: 1.77 M2
ECHO EST RA PRESSURE: 3 MMHG
ECHO LA DIAMETER INDEX: 2.4 CM/M2
ECHO LA DIAMETER: 4.2 CM
ECHO LA VOL A-L A2C: 77 ML (ref 18–58)
ECHO LA VOL A-L A4C: 56 ML (ref 18–58)
ECHO LA VOL MOD A2C: 73 ML (ref 18–58)
ECHO LA VOL MOD A4C: 52 ML (ref 18–58)
ECHO LA VOLUME AREA LENGTH: 68 ML
ECHO LA VOLUME INDEX A-L A2C: 44 ML/M2 (ref 16–34)
ECHO LA VOLUME INDEX A-L A4C: 32 ML/M2 (ref 16–34)
ECHO LA VOLUME INDEX AREA LENGTH: 39 ML/M2 (ref 16–34)
ECHO LA VOLUME INDEX MOD A2C: 42 ML/M2 (ref 16–34)
ECHO LA VOLUME INDEX MOD A4C: 30 ML/M2 (ref 16–34)
ECHO LV FRACTIONAL SHORTENING: 28 % (ref 28–44)
ECHO LV INTERNAL DIMENSION DIASTOLE INDEX: 3.03 CM/M2
ECHO LV INTERNAL DIMENSION DIASTOLIC: 5.3 CM (ref 4.2–5.9)
ECHO LV INTERNAL DIMENSION SYSTOLIC INDEX: 2.17 CM/M2
ECHO LV INTERNAL DIMENSION SYSTOLIC: 3.8 CM
ECHO LV ISOVOLUMETRIC RELAXATION TIME (IVRT): 87.7 MS
ECHO LV IVSD: 0.7 CM (ref 0.6–1)
ECHO LV IVSS: 1.3 CM
ECHO LV MASS 2D: 127 G (ref 88–224)
ECHO LV MASS INDEX 2D: 72.6 G/M2 (ref 49–115)
ECHO LV POSTERIOR WALL DIASTOLIC: 0.7 CM (ref 0.6–1)
ECHO LV POSTERIOR WALL SYSTOLIC: 1.3 CM
ECHO LV RELATIVE WALL THICKNESS RATIO: 0.26
ECHO LVOT AREA: 3.8 CM2
ECHO LVOT AV VTI INDEX: 0.43
ECHO LVOT DIAM: 2.2 CM
ECHO LVOT MEAN GRADIENT: 1 MMHG
ECHO LVOT PEAK GRADIENT: 4 MMHG
ECHO LVOT PEAK VELOCITY: 0.9 M/S
ECHO LVOT STROKE VOLUME INDEX: 38.6 ML/M2
ECHO LVOT SV: 67.6 ML
ECHO LVOT VTI: 17.8 CM
ECHO MV AREA PHT: 4.8 CM2
ECHO MV AREA VTI: 2.9 CM2
ECHO MV E DECELERATION TIME (DT): 223.8 MS
ECHO MV EROA PISA: 0.1 CM2
ECHO MV LVOT VTI INDEX: 1.31
ECHO MV MAX VELOCITY: 0.9 M/S
ECHO MV MEAN GRADIENT: 1 MMHG
ECHO MV MEAN VELOCITY: 0.4 M/S
ECHO MV PEAK GRADIENT: 3 MMHG
ECHO MV PRESSURE HALF TIME (PHT): 45.6 MS
ECHO MV REGURGITANT ALIASING (NYQUIST) VELOCITY: 31 CM/S
ECHO MV REGURGITANT RADIUS PISA: 0.43 CM
ECHO MV REGURGITANT VELOCITY PISA: 5.2 M/S
ECHO MV REGURGITANT VOLUME PISA: 13.84 ML
ECHO MV REGURGITANT VTIA: 199.9 CM
ECHO MV VTI: 23.4 CM
ECHO PULMONARY ARTERY END DIASTOLIC PRESSURE: 5 MMHG
ECHO PV MAX VELOCITY: 1 M/S
ECHO PV MEAN GRADIENT: 2 MMHG
ECHO PV MEAN VELOCITY: 0.6 M/S
ECHO PV PEAK GRADIENT: 4 MMHG
ECHO PV REGURGITANT MAX VELOCITY: 1.1 M/S
ECHO PV VTI: 17.9 CM
ECHO PVEIN PEAK D VELOCITY: 0.8 M/S
ECHO PVEIN PEAK S VELOCITY: 0.6 M/S
ECHO PVEIN S/D RATIO: 0.8
ECHO RIGHT VENTRICULAR SYSTOLIC PRESSURE (RVSP): 20 MMHG
ECHO TV REGURGITANT MAX VELOCITY: 2.04 M/S
ECHO TV REGURGITANT PEAK GRADIENT: 17 MMHG
EOSINOPHIL # BLD: 0.56 K/UL (ref 0.05–0.5)
EOSINOPHILS RELATIVE PERCENT: 6 % (ref 0–6)
ERYTHROCYTE [DISTWIDTH] IN BLOOD BY AUTOMATED COUNT: 13.4 % (ref 11.5–15)
GFR, ESTIMATED: 86 ML/MIN/1.73M2
GLUCOSE SERPL-MCNC: 86 MG/DL (ref 74–99)
GLUCOSE UR STRIP-MCNC: NEGATIVE MG/DL
HCT VFR BLD AUTO: 42.9 % (ref 37–54)
HGB BLD-MCNC: 13.7 G/DL (ref 12.5–16.5)
HGB UR QL STRIP.AUTO: NEGATIVE
IMM GRANULOCYTES # BLD AUTO: 0.05 K/UL (ref 0–0.58)
IMM GRANULOCYTES NFR BLD: 1 % (ref 0–5)
KETONES UR STRIP-MCNC: NEGATIVE MG/DL
LACTATE BLDV-SCNC: 0.9 MMOL/L (ref 0.5–1.9)
LACTATE BLDV-SCNC: 0.9 MMOL/L (ref 0.5–1.9)
LEUKOCYTE ESTERASE UR QL STRIP: ABNORMAL
LIPASE SERPL-CCNC: 53 U/L (ref 13–60)
LYMPHOCYTES NFR BLD: 1.92 K/UL (ref 1.5–4)
LYMPHOCYTES RELATIVE PERCENT: 21 % (ref 20–42)
MCH RBC QN AUTO: 32.2 PG (ref 26–35)
MCHC RBC AUTO-ENTMCNC: 31.9 G/DL (ref 32–34.5)
MCV RBC AUTO: 100.9 FL (ref 80–99.9)
MONOCYTES NFR BLD: 0.65 K/UL (ref 0.1–0.95)
MONOCYTES NFR BLD: 7 % (ref 2–12)
NEUTROPHILS NFR BLD: 64 % (ref 43–80)
NEUTS SEG NFR BLD: 5.79 K/UL (ref 1.8–7.3)
NITRITE UR QL STRIP: NEGATIVE
PH UR STRIP: 6 [PH] (ref 5–9)
PLATELET # BLD AUTO: 149 K/UL (ref 130–450)
PMV BLD AUTO: 9.7 FL (ref 7–12)
POTASSIUM SERPL-SCNC: 4 MMOL/L (ref 3.5–5)
PROT SERPL-MCNC: 7.1 G/DL (ref 6.4–8.3)
PROT UR STRIP-MCNC: NEGATIVE MG/DL
RBC # BLD AUTO: 4.25 M/UL (ref 3.8–5.8)
RBC #/AREA URNS HPF: ABNORMAL /HPF
SODIUM SERPL-SCNC: 141 MMOL/L (ref 132–146)
SP GR UR STRIP: 1.02 (ref 1–1.03)
TROPONIN I SERPL HS-MCNC: 31 NG/L (ref 0–11)
TROPONIN I SERPL HS-MCNC: 31 NG/L (ref 0–11)
UROBILINOGEN UR STRIP-ACNC: 0.2 EU/DL (ref 0–1)
WBC #/AREA URNS HPF: ABNORMAL /HPF
WBC OTHER # BLD: 9.1 K/UL (ref 4.5–11.5)

## 2024-05-15 PROCEDURE — 6360000002 HC RX W HCPCS: Performed by: NURSE PRACTITIONER

## 2024-05-15 PROCEDURE — 83605 ASSAY OF LACTIC ACID: CPT

## 2024-05-15 PROCEDURE — 85379 FIBRIN DEGRADATION QUANT: CPT

## 2024-05-15 PROCEDURE — 6370000000 HC RX 637 (ALT 250 FOR IP): Performed by: NURSE PRACTITIONER

## 2024-05-15 PROCEDURE — 80053 COMPREHEN METABOLIC PANEL: CPT

## 2024-05-15 PROCEDURE — 84484 ASSAY OF TROPONIN QUANT: CPT

## 2024-05-15 PROCEDURE — 85025 COMPLETE CBC W/AUTO DIFF WBC: CPT

## 2024-05-15 PROCEDURE — 2060000000 HC ICU INTERMEDIATE R&B

## 2024-05-15 PROCEDURE — 99223 1ST HOSP IP/OBS HIGH 75: CPT | Performed by: STUDENT IN AN ORGANIZED HEALTH CARE EDUCATION/TRAINING PROGRAM

## 2024-05-15 PROCEDURE — 93005 ELECTROCARDIOGRAM TRACING: CPT | Performed by: NURSE PRACTITIONER

## 2024-05-15 PROCEDURE — 93306 TTE W/DOPPLER COMPLETE: CPT | Performed by: INTERNAL MEDICINE

## 2024-05-15 PROCEDURE — 99285 EMERGENCY DEPT VISIT HI MDM: CPT

## 2024-05-15 PROCEDURE — 83690 ASSAY OF LIPASE: CPT

## 2024-05-15 PROCEDURE — 81001 URINALYSIS AUTO W/SCOPE: CPT

## 2024-05-15 PROCEDURE — 6360000004 HC RX CONTRAST MEDICATION: Performed by: RADIOLOGY

## 2024-05-15 PROCEDURE — 71275 CT ANGIOGRAPHY CHEST: CPT

## 2024-05-15 PROCEDURE — 74176 CT ABD & PELVIS W/O CONTRAST: CPT

## 2024-05-15 PROCEDURE — 96374 THER/PROPH/DIAG INJ IV PUSH: CPT

## 2024-05-15 RX ORDER — METHOCARBAMOL 500 MG/1
500 TABLET, FILM COATED ORAL ONCE
Status: COMPLETED | OUTPATIENT
Start: 2024-05-15 | End: 2024-05-15

## 2024-05-15 RX ORDER — HYDROCODONE BITARTRATE AND ACETAMINOPHEN 5; 325 MG/1; MG/1
1 TABLET ORAL ONCE
Status: COMPLETED | OUTPATIENT
Start: 2024-05-15 | End: 2024-05-15

## 2024-05-15 RX ORDER — KETOROLAC TROMETHAMINE 15 MG/ML
15 INJECTION, SOLUTION INTRAMUSCULAR; INTRAVENOUS ONCE
Status: COMPLETED | OUTPATIENT
Start: 2024-05-15 | End: 2024-05-15

## 2024-05-15 RX ADMIN — KETOROLAC TROMETHAMINE 15 MG: 15 INJECTION, SOLUTION INTRAMUSCULAR; INTRAVENOUS at 17:55

## 2024-05-15 RX ADMIN — HYDROCODONE BITARTRATE AND ACETAMINOPHEN 1 TABLET: 5; 325 TABLET ORAL at 23:24

## 2024-05-15 RX ADMIN — METHOCARBAMOL TABLETS 500 MG: 500 TABLET, COATED ORAL at 17:54

## 2024-05-15 RX ADMIN — IOPAMIDOL 75 ML: 755 INJECTION, SOLUTION INTRAVENOUS at 19:10

## 2024-05-15 ASSESSMENT — PAIN - FUNCTIONAL ASSESSMENT
PAIN_FUNCTIONAL_ASSESSMENT: 0-10
PAIN_FUNCTIONAL_ASSESSMENT: ACTIVITIES ARE NOT PREVENTED
PAIN_FUNCTIONAL_ASSESSMENT: PREVENTS OR INTERFERES WITH ALL ACTIVE AND SOME PASSIVE ACTIVITIES

## 2024-05-15 ASSESSMENT — PAIN DESCRIPTION - PAIN TYPE: TYPE: ACUTE PAIN

## 2024-05-15 ASSESSMENT — PAIN DESCRIPTION - ORIENTATION
ORIENTATION: RIGHT
ORIENTATION: LOWER
ORIENTATION: RIGHT

## 2024-05-15 ASSESSMENT — PAIN DESCRIPTION - LOCATION
LOCATION: FLANK;ABDOMEN
LOCATION: BACK
LOCATION: BACK

## 2024-05-15 ASSESSMENT — PAIN DESCRIPTION - DESCRIPTORS
DESCRIPTORS: PENETRATING
DESCRIPTORS: SHARP

## 2024-05-15 ASSESSMENT — LIFESTYLE VARIABLES
HOW OFTEN DO YOU HAVE A DRINK CONTAINING ALCOHOL: 2-4 TIMES A MONTH
HOW MANY STANDARD DRINKS CONTAINING ALCOHOL DO YOU HAVE ON A TYPICAL DAY: 1 OR 2

## 2024-05-15 ASSESSMENT — PAIN SCALES - GENERAL
PAINLEVEL_OUTOF10: 10
PAINLEVEL_OUTOF10: 8
PAINLEVEL_OUTOF10: 8

## 2024-05-15 NOTE — ED TRIAGE NOTES
Department of Emergency Medicine    FIRST PROVIDER TRIAGE NOTE             Independent MLP           5/15/24  2:53 PM EDT    Date of Encounter: 5/15/24   MRN: [id number]    Vitals:    05/15/24 1451   BP: (!) 153/96   Pulse: 64   Resp: 14   SpO2: 98%   Weight: 65.3 kg (144 lb)   Height: 1.651 m (5' 5\")      HPI: Irving Butt is a 73 y.o. male who presents to the ED for Flank Pain (Right flank pain.  Hx of stones.  1 week)     ROS: Negative for cp, sob, or fever.    Physical Exam:   Gen Appearance/Constitutional: alert  CV: regular rate     Initial Plan of Care: All treatment areas with department are currently occupied.     Plan to order/Initiate the following while awaiting opening in ED: Triage evaluation  labs and imaging studies.    Provider-Patient relationship only established for Provider In Triage (PIT).  Full assessment, HPI and examination not performed, therefore, it is not yet possible to state whether or not an emergency medical condition exists    Initial Plan of Care: Initiate Treatment-Testing, Proceed toTreatment Area When Bed Available for ED Attending/MLP to Continue Care  Secondary to high volume, low staffing, and/or boarding- patient to await bed availability.    This ends my PIT-Patient relationship.  Care of patient relinquished after triage    Electronically signed by Ela Parsons PA-C   DD: 5/15/24

## 2024-05-15 NOTE — ED PROVIDER NOTES
Shared MITZI-ED Attending Visit.  CC: No       [facility]  Department of Emergency Medicine   ED  Encounter Note  Admit Date/RoomTime: 5/15/2024  4:37 PM  ED Room:     NAME: Irving Butt  : 1950  MRN: [id number]     Chief Complaint:  Flank Pain (Right flank pain.  Hx of stones.  1 week)    History of Present Illness        Irving Butt is a 73 y.o. old male with a history of an enlarged prostate, prior UroLift surgery, high cholesterol, HTN, kidney stones, cholecystectomy and atrial fibrillation who presents to the emergency department by private vehicle, for persistent right abdomen and right upper flank flank pain for the last week. Today, pain got worse.  He has not taken anything for pain today.  He denies fevers, chills, nausea, vomiting, chest pain, shortness of breath.  Denies urinary symptoms.  He has not noticed any blood in his urine.    He said pain is worse with certain movements such as sitting forward and also when taking a deep breath. He is not a smoker, rarely drinks, does not use any drugs.  No cancer history of history of blood clots.     ROS   Pertinent positives and negatives are stated within HPI, all other systems reviewed and are negative.    Past Medical History:  has a past medical history of Acute cystitis with hematuria, Acute respiratory failure (HCC), Aneurysm artery, pulmonary (HCC), Arthritis, Asthma, Cancer (HCC), Community acquired pneumonia of left lower lobe of lung, Deviated septum, Gallbladder disease, Hepatitis B, Hypercholesterolemia, Hypertension, Kidney stone, Restless leg syndrome, and Rheumatoid arthritis(714.0).    Surgical History:  has a past surgical history that includes hernia repair (Left); cardiovascular stress test (); Tonsillectomy; hernia repair; Cataract removal (Bilateral, ); Colonoscopy; Diagnostic Cardiac Cath Lab Procedure; Vasectomy; Adenoidectomy; cyst removal; skin biopsy (2018); Cardiac valuve replacement  (06/16/2020); Cardiac catheterization; Nasal septum surgery; Cystoscopy (N/A, 06/16/2023); and Cholecystectomy, laparoscopic (N/A, 7/10/2023).    Social History:  reports that he quit smoking about 48 years ago. His smoking use included cigarettes. He started smoking about 58 years ago. He has a 24.9 pack-year smoking history. He has never used smokeless tobacco. He reports current alcohol use. He reports that he does not use drugs.    Family History: family history is not on file. He was adopted.     Allergies: Statins [statins]    Physical Exam   Oxygen Saturation Interpretation: Normal.        ED Triage Vitals [05/15/24 1451]   BP Temp Temp src Pulse Respirations SpO2 Height Weight - Scale   (!) 153/96 -- -- 64 14 98 % 1.651 m (5' 5\") 65.3 kg (144 lb)       Physical Exam  General Appearance/Constitutional:  Alert, development consistent with age. Non-toxic appearing, no acute distress.   HEENT:  NC/NT. Airway patent.  Neck:  Supple.  Full ROM, no meningeal signs.   Respiratory: Lungs Clear to auscultation and breath sounds equal. Respirations regular, non-labored, no tachypnea.   CV:  Regular rate and rhythm.Skin warm, pink, dry.   GI:  normal appearing, non-distended with no visible hernias.         Bowel sounds: normal bowel sounds.             Tenderness: There is mild tenderness present - located in the right flank..           Liver: non-tender.                            Spleen:  non-tender.   Back: CVA Tenderness: No CVA tenderness.  No reproducible back pain with my palpation. Only with deep inspiration and certain movements.   Integument:  Normal turgor.  Warm, dry, without visible rash, unless noted elsewhere.  Neurological:  Orientation age-appropriate.  Motor functions intact. GCS 15, fluent speech, ambulatory, MONROE x4.     Lab / Imaging Results   (All laboratory and radiology results have been personally reviewed by myself)  Labs:  Results for orders placed or performed during the hospital encounter of

## 2024-05-16 ENCOUNTER — APPOINTMENT (OUTPATIENT)
Dept: ULTRASOUND IMAGING | Age: 74
End: 2024-05-16
Payer: MEDICARE

## 2024-05-16 ENCOUNTER — TELEPHONE (OUTPATIENT)
Dept: CARDIOLOGY CLINIC | Age: 74
End: 2024-05-16

## 2024-05-16 LAB
ALBUMIN SERPL-MCNC: 3.9 G/DL (ref 3.5–5.2)
ALP SERPL-CCNC: 44 U/L (ref 40–129)
ALT SERPL-CCNC: 27 U/L (ref 0–40)
ANION GAP SERPL CALCULATED.3IONS-SCNC: 11 MMOL/L (ref 7–16)
AST SERPL-CCNC: 39 U/L (ref 0–39)
BASOPHILS # BLD: 0.07 K/UL (ref 0–0.2)
BASOPHILS NFR BLD: 1 % (ref 0–2)
BILIRUB SERPL-MCNC: 1.8 MG/DL (ref 0–1.2)
BUN SERPL-MCNC: 21 MG/DL (ref 6–23)
CALCIUM SERPL-MCNC: 8.7 MG/DL (ref 8.6–10.2)
CHLORIDE SERPL-SCNC: 106 MMOL/L (ref 98–107)
CO2 SERPL-SCNC: 21 MMOL/L (ref 22–29)
CREAT SERPL-MCNC: 1 MG/DL (ref 0.7–1.2)
EKG ATRIAL RATE: 44 BPM
EKG P AXIS: -3 DEGREES
EKG P-R INTERVAL: 194 MS
EKG Q-T INTERVAL: 468 MS
EKG QRS DURATION: 110 MS
EKG QTC CALCULATION (BAZETT): 400 MS
EKG R AXIS: -15 DEGREES
EKG T AXIS: 54 DEGREES
EKG VENTRICULAR RATE: 44 BPM
EOSINOPHIL # BLD: 0.56 K/UL (ref 0.05–0.5)
EOSINOPHILS RELATIVE PERCENT: 7 % (ref 0–6)
ERYTHROCYTE [DISTWIDTH] IN BLOOD BY AUTOMATED COUNT: 13.3 % (ref 11.5–15)
GFR, ESTIMATED: 82 ML/MIN/1.73M2
GLUCOSE SERPL-MCNC: 74 MG/DL (ref 74–99)
HCT VFR BLD AUTO: 35.1 % (ref 37–54)
HGB BLD-MCNC: 11.4 G/DL (ref 12.5–16.5)
IMM GRANULOCYTES # BLD AUTO: 0.03 K/UL (ref 0–0.58)
IMM GRANULOCYTES NFR BLD: 0 % (ref 0–5)
LYMPHOCYTES NFR BLD: 2.05 K/UL (ref 1.5–4)
LYMPHOCYTES RELATIVE PERCENT: 27 % (ref 20–42)
MCH RBC QN AUTO: 32 PG (ref 26–35)
MCHC RBC AUTO-ENTMCNC: 32.5 G/DL (ref 32–34.5)
MCV RBC AUTO: 98.6 FL (ref 80–99.9)
MONOCYTES NFR BLD: 0.53 K/UL (ref 0.1–0.95)
MONOCYTES NFR BLD: 7 % (ref 2–12)
NEUTROPHILS NFR BLD: 58 % (ref 43–80)
NEUTS SEG NFR BLD: 4.39 K/UL (ref 1.8–7.3)
PLATELET # BLD AUTO: 125 K/UL (ref 130–450)
PMV BLD AUTO: 9.9 FL (ref 7–12)
POTASSIUM SERPL-SCNC: 3.8 MMOL/L (ref 3.5–5)
PROT SERPL-MCNC: 5.8 G/DL (ref 6.4–8.3)
RBC # BLD AUTO: 3.56 M/UL (ref 3.8–5.8)
SODIUM SERPL-SCNC: 138 MMOL/L (ref 132–146)
TSH SERPL DL<=0.05 MIU/L-ACNC: 1.44 UIU/ML (ref 0.27–4.2)
WBC OTHER # BLD: 7.6 K/UL (ref 4.5–11.5)

## 2024-05-16 PROCEDURE — 6360000002 HC RX W HCPCS: Performed by: STUDENT IN AN ORGANIZED HEALTH CARE EDUCATION/TRAINING PROGRAM

## 2024-05-16 PROCEDURE — 85025 COMPLETE CBC W/AUTO DIFF WBC: CPT

## 2024-05-16 PROCEDURE — 6370000000 HC RX 637 (ALT 250 FOR IP): Performed by: NURSE PRACTITIONER

## 2024-05-16 PROCEDURE — 6370000000 HC RX 637 (ALT 250 FOR IP): Performed by: STUDENT IN AN ORGANIZED HEALTH CARE EDUCATION/TRAINING PROGRAM

## 2024-05-16 PROCEDURE — 80053 COMPREHEN METABOLIC PANEL: CPT

## 2024-05-16 PROCEDURE — 76770 US EXAM ABDO BACK WALL COMP: CPT

## 2024-05-16 PROCEDURE — 84443 ASSAY THYROID STIM HORMONE: CPT

## 2024-05-16 PROCEDURE — 94640 AIRWAY INHALATION TREATMENT: CPT

## 2024-05-16 PROCEDURE — 2580000003 HC RX 258: Performed by: STUDENT IN AN ORGANIZED HEALTH CARE EDUCATION/TRAINING PROGRAM

## 2024-05-16 PROCEDURE — 93010 ELECTROCARDIOGRAM REPORT: CPT | Performed by: INTERNAL MEDICINE

## 2024-05-16 PROCEDURE — 2060000000 HC ICU INTERMEDIATE R&B

## 2024-05-16 PROCEDURE — 99223 1ST HOSP IP/OBS HIGH 75: CPT | Performed by: INTERNAL MEDICINE

## 2024-05-16 RX ORDER — ARFORMOTEROL TARTRATE 15 UG/2ML
15 SOLUTION RESPIRATORY (INHALATION)
Status: DISCONTINUED | OUTPATIENT
Start: 2024-05-16 | End: 2024-05-18 | Stop reason: HOSPADM

## 2024-05-16 RX ORDER — CAL/D3/MAG11/ZINC/COP/MANG/BOR 600 MG-800
1 TABLET ORAL 2 TIMES DAILY
COMMUNITY

## 2024-05-16 RX ORDER — SODIUM CHLORIDE 0.9 % (FLUSH) 0.9 %
5-40 SYRINGE (ML) INJECTION EVERY 12 HOURS SCHEDULED
Status: DISCONTINUED | OUTPATIENT
Start: 2024-05-16 | End: 2024-05-18 | Stop reason: HOSPADM

## 2024-05-16 RX ORDER — ALBUTEROL SULFATE 90 UG/1
1 AEROSOL, METERED RESPIRATORY (INHALATION) PRN
Status: DISCONTINUED | OUTPATIENT
Start: 2024-05-16 | End: 2024-05-16 | Stop reason: CLARIF

## 2024-05-16 RX ORDER — METOPROLOL SUCCINATE 25 MG/1
12.5 TABLET, EXTENDED RELEASE ORAL DAILY
Status: DISCONTINUED | OUTPATIENT
Start: 2024-05-16 | End: 2024-05-18 | Stop reason: HOSPADM

## 2024-05-16 RX ORDER — SODIUM CHLORIDE 9 MG/ML
INJECTION, SOLUTION INTRAVENOUS CONTINUOUS
Status: ACTIVE | OUTPATIENT
Start: 2024-05-16 | End: 2024-05-18

## 2024-05-16 RX ORDER — HYDROCODONE BITARTRATE AND ACETAMINOPHEN 5; 325 MG/1; MG/1
1 TABLET ORAL EVERY 6 HOURS PRN
Status: DISCONTINUED | OUTPATIENT
Start: 2024-05-16 | End: 2024-05-18 | Stop reason: HOSPADM

## 2024-05-16 RX ORDER — SODIUM CHLORIDE 0.9 % (FLUSH) 0.9 %
5-40 SYRINGE (ML) INJECTION PRN
Status: DISCONTINUED | OUTPATIENT
Start: 2024-05-16 | End: 2024-05-18 | Stop reason: HOSPADM

## 2024-05-16 RX ORDER — ONDANSETRON 4 MG/1
4 TABLET, ORALLY DISINTEGRATING ORAL EVERY 8 HOURS PRN
Status: DISCONTINUED | OUTPATIENT
Start: 2024-05-16 | End: 2024-05-18 | Stop reason: HOSPADM

## 2024-05-16 RX ORDER — ACETAMINOPHEN 325 MG/1
650 TABLET ORAL EVERY 6 HOURS PRN
Status: DISCONTINUED | OUTPATIENT
Start: 2024-05-16 | End: 2024-05-18 | Stop reason: HOSPADM

## 2024-05-16 RX ORDER — METOPROLOL SUCCINATE 25 MG/1
25 TABLET, EXTENDED RELEASE ORAL EVERY MORNING
Status: ON HOLD | COMMUNITY
End: 2024-05-18 | Stop reason: HOSPADM

## 2024-05-16 RX ORDER — ACETAMINOPHEN 650 MG/1
650 SUPPOSITORY RECTAL EVERY 6 HOURS PRN
Status: DISCONTINUED | OUTPATIENT
Start: 2024-05-16 | End: 2024-05-18 | Stop reason: HOSPADM

## 2024-05-16 RX ORDER — BUDESONIDE 0.25 MG/2ML
0.25 INHALANT ORAL
Status: DISCONTINUED | OUTPATIENT
Start: 2024-05-16 | End: 2024-05-18 | Stop reason: HOSPADM

## 2024-05-16 RX ORDER — SODIUM CHLORIDE 9 MG/ML
INJECTION, SOLUTION INTRAVENOUS PRN
Status: DISCONTINUED | OUTPATIENT
Start: 2024-05-16 | End: 2024-05-18 | Stop reason: HOSPADM

## 2024-05-16 RX ORDER — ALBUTEROL SULFATE 2.5 MG/3ML
2.5 SOLUTION RESPIRATORY (INHALATION) EVERY 6 HOURS PRN
Status: DISCONTINUED | OUTPATIENT
Start: 2024-05-16 | End: 2024-05-18 | Stop reason: HOSPADM

## 2024-05-16 RX ORDER — BUDESONIDE AND FORMOTEROL FUMARATE DIHYDRATE 80; 4.5 UG/1; UG/1
2 AEROSOL RESPIRATORY (INHALATION)
Status: DISCONTINUED | OUTPATIENT
Start: 2024-05-16 | End: 2024-05-16 | Stop reason: CLARIF

## 2024-05-16 RX ORDER — POLYETHYLENE GLYCOL 3350 17 G/17G
17 POWDER, FOR SOLUTION ORAL DAILY PRN
Status: DISCONTINUED | OUTPATIENT
Start: 2024-05-16 | End: 2024-05-18 | Stop reason: HOSPADM

## 2024-05-16 RX ORDER — POTASSIUM CHLORIDE 20 MEQ/1
40 TABLET, EXTENDED RELEASE ORAL PRN
Status: DISCONTINUED | OUTPATIENT
Start: 2024-05-16 | End: 2024-05-18 | Stop reason: HOSPADM

## 2024-05-16 RX ORDER — ONDANSETRON 2 MG/ML
4 INJECTION INTRAMUSCULAR; INTRAVENOUS EVERY 6 HOURS PRN
Status: DISCONTINUED | OUTPATIENT
Start: 2024-05-16 | End: 2024-05-18 | Stop reason: HOSPADM

## 2024-05-16 RX ORDER — MAGNESIUM SULFATE IN WATER 40 MG/ML
2000 INJECTION, SOLUTION INTRAVENOUS PRN
Status: DISCONTINUED | OUTPATIENT
Start: 2024-05-16 | End: 2024-05-18 | Stop reason: HOSPADM

## 2024-05-16 RX ORDER — ASPIRIN 81 MG/1
81 TABLET, CHEWABLE ORAL DAILY
Status: DISCONTINUED | OUTPATIENT
Start: 2024-05-16 | End: 2024-05-18 | Stop reason: HOSPADM

## 2024-05-16 RX ORDER — POTASSIUM CHLORIDE 7.45 MG/ML
10 INJECTION INTRAVENOUS PRN
Status: DISCONTINUED | OUTPATIENT
Start: 2024-05-16 | End: 2024-05-18 | Stop reason: HOSPADM

## 2024-05-16 RX ORDER — ATORVASTATIN CALCIUM 10 MG/1
10 TABLET, FILM COATED ORAL NIGHTLY
Status: DISCONTINUED | OUTPATIENT
Start: 2024-05-16 | End: 2024-05-18 | Stop reason: HOSPADM

## 2024-05-16 RX ORDER — ALBUTEROL SULFATE 90 UG/1
1-2 AEROSOL, METERED RESPIRATORY (INHALATION)
COMMUNITY

## 2024-05-16 RX ADMIN — ARFORMOTEROL TARTRATE 15 MCG: 15 SOLUTION RESPIRATORY (INHALATION) at 08:44

## 2024-05-16 RX ADMIN — ASPIRIN 81 MG CHEWABLE TABLET 81 MG: 81 TABLET CHEWABLE at 10:46

## 2024-05-16 RX ADMIN — HYDROCODONE BITARTRATE AND ACETAMINOPHEN 1 TABLET: 5; 325 TABLET ORAL at 19:35

## 2024-05-16 RX ADMIN — BUDESONIDE 250 MCG: 0.25 SUSPENSION RESPIRATORY (INHALATION) at 20:41

## 2024-05-16 RX ADMIN — SODIUM CHLORIDE, PRESERVATIVE FREE 10 ML: 5 INJECTION INTRAVENOUS at 10:47

## 2024-05-16 RX ADMIN — ATORVASTATIN CALCIUM 10 MG: 10 TABLET, FILM COATED ORAL at 19:35

## 2024-05-16 RX ADMIN — APIXABAN 5 MG: 5 TABLET, FILM COATED ORAL at 10:46

## 2024-05-16 RX ADMIN — APIXABAN 5 MG: 5 TABLET, FILM COATED ORAL at 19:36

## 2024-05-16 RX ADMIN — HYDROCODONE BITARTRATE AND ACETAMINOPHEN 1 TABLET: 5; 325 TABLET ORAL at 05:29

## 2024-05-16 RX ADMIN — METOPROLOL SUCCINATE 12.5 MG: 25 TABLET, EXTENDED RELEASE ORAL at 17:56

## 2024-05-16 RX ADMIN — SODIUM CHLORIDE: 9 INJECTION, SOLUTION INTRAVENOUS at 05:31

## 2024-05-16 RX ADMIN — BUDESONIDE 250 MCG: 0.25 SUSPENSION RESPIRATORY (INHALATION) at 08:44

## 2024-05-16 RX ADMIN — ARFORMOTEROL TARTRATE 15 MCG: 15 SOLUTION RESPIRATORY (INHALATION) at 20:41

## 2024-05-16 ASSESSMENT — PAIN SCALES - GENERAL
PAINLEVEL_OUTOF10: 6
PAINLEVEL_OUTOF10: 3

## 2024-05-16 NOTE — ED NOTES
Notified NYLA Tolbert with cardiology that new consult was paced for bradycardia. Also asked about diet for pt. No new orders at this time, message stated \"read\" at 1107.    Subjective:     Uvaldo Link is a 17 y.o. male who presents for a school sports physical exam.  Patient/parent deny any current health related concerns. He plans to participate in [employer]. Recent sports injuries?: No  History of concussions?: No  Family history of sudden cardiac death: States Morales had a massive heart attack at age 72. Current Issues:  Current concerns on the part of Samson's  none . Currently menstruating? not applicable  Does patient snore? no     Review of Nutrition:  Current diet: Regular  Balanced diet? yes    Social Screening:  Discipline concerns? no  Concerns regarding behavior with peers? no  School performance: doing well; no concerns  Secondhand smoke exposure? no    ROS:  History obtained from the patient  General ROS: negative  Psychological ROS: negative  Ophthalmic ROS: negative  ENT ROS: negative  Allergy and Immunology ROS: negative  Hematological and Lymphatic ROS: negative  Endocrine ROS: negative  Breast ROS: negative for breast lumps  Respiratory ROS: no cough, shortness of breath, or wheezing  Cardiovascular ROS: no chest pain or dyspnea on exertion  Gastrointestinal ROS: no abdominal pain, change in bowel habits, or black or bloody stools  Genito-Urinary ROS: no dysuria, trouble voiding, or hematuria  Musculoskeletal ROS: negative  Neurological ROS: no TIA or stroke symptoms  Dermatological ROS: negative     History reviewed. No pertinent past medical history. Past Surgical History:   Procedure Laterality Date    TYMPANOSTOMY TUBE PLACEMENT      age 3, hearing improved, no more ear infections       No Known Allergies    No outpatient medications have been marked as taking for the 12/7/22 encounter (Office Visit) with SID Rosenberg CNP.        Family History   Problem Relation Age of Onset    Depression Mother     Asthma Brother 5        Tariq       Social History     Socioeconomic History    Marital status: Single     Spouse name: Not on file Number of children: Not on file    Years of education: Not on file    Highest education level: Not on file   Occupational History    Occupation: 1st grade   Tobacco Use    Smoking status: Never    Smokeless tobacco: Never   Substance and Sexual Activity    Alcohol use: No    Drug use: No    Sexual activity: Never   Other Topics Concern    Not on file   Social History Narrative    Not on file     Social Determinants of Health     Financial Resource Strain: Not on file   Food Insecurity: Not on file   Transportation Needs: Not on file   Physical Activity: Not on file   Stress: Not on file   Social Connections: Not on file   Intimate Partner Violence: Not on file   Housing Stability: Not on file       Immunization History   Administered Date(s) Administered    COVID-19, PFIZER PURPLE top, DILUTE for use, (age 15 y+), 30mcg/0.3mL 12/28/2021, 01/20/2022    DTaP 03/07/2008, 07/11/2008, 11/10/2008, 05/11/2009, 08/07/2013    HPV 9-valent ([address]) 10/07/2020    Hepatitis A Ped/Adol (Havrix, Vaqta) 10/07/2020    Hepatitis B 2007, 03/07/2008, 05/11/2009    Hib, unspecified 03/07/2008, 07/11/2008, 11/10/2008, 05/11/2009    Influenza Virus Vaccine 11/10/2008, 12/12/2008, 09/30/2011, 08/24/2012, 10/21/2013, 11/13/2013, 10/15/2014, 09/18/2015    Influenza, FLUARIX, FLULAVAL, FLUZONE (age 10 mo+) AND AFLURIA, (age 1 y+), PF, 0.5mL 10/26/2016, 10/26/2017, 10/23/2018, 11/14/2019, 10/07/2020    Influenza, FLUCELVAX, (age 10 mo+), MDCK, PF, 0.5mL 12/28/2021    MMR 02/08/2010, 04/19/2013    Meningococcal MCV4O (Menveo) 10/07/2020    Pneumococcal Conjugate 7-valent (Prevnar7) 03/07/2008, 07/11/2008, 11/10/2008    Polio IPV (IPOL) 03/07/2008, 07/11/2008, 05/11/2009, 08/07/2013    Rotavirus Pentavalent (RotaTeq) 03/07/2008, 07/11/2008    Tdap (Boostrix, Adacel) 10/07/2020    Varicella (Varivax) 02/08/2010, 04/19/2013       Objective:        Vitals:    12/07/22 1539   BP: 102/58   Pulse: 83   Temp: 99 °F (37.2 °C)   TempSrc: Oral SpO2: 98%   Weight: 136 lb 9.6 oz (62 kg)   Height: 5' 8.25\" (1.734 m)     Growth parameters are noted and are appropriate for age. Vision screening done? yes - R 20/20 L 20/25 B 20/20    General:   alert, appears stated age, and cooperative   Gait:   normal   Skin:   normal   Oral cavity:   lips, mucosa, and tongue normal; teeth and gums normal   Eyes:   sclerae white, pupils equal and reactive, red reflex normal bilaterally   Ears:   normal bilaterally   Neck:   no adenopathy, no carotid bruit, no JVD, supple, symmetrical, trachea midline, and thyroid not enlarged, symmetric, no tenderness/mass/nodules   Lungs:  clear to auscultation bilaterally   Heart:   regular rate and rhythm, S1, S2 normal, no murmur, click, rub or gallop   Abdomen:  soft, non-tender; bowel sounds normal; no masses,  no organomegaly   :  exam deferred   Jean Marie stage:      Extremities:  extremities normal, atraumatic, no cyanosis or edema   Neuro:  normal without focal findings, mental status, speech normal, alert and oriented x3, TIFFANI, and reflexes normal and symmetric         Duck walk: normal  Single leg hop:  normal      Assessment:        Diagnosis Orders   1. Wellness examination                Plan:     Permission granted to participate in athletics without restrictions - form signed and returned to patient. 1. Anticipatory guidance: Gave CRS handout on well-child issues at this age. 2. Screening tests:   a.   Hb or HCT (CDC recommends screening at this age only if h/o Fe deficiency, low Fe intake, or special health care needs): not indicated    b.  PPD: not applicable (Recommended annually if at risk: immunosuppression, clinical suspicion, poor/overcrowded living conditions, recent immigrant from TB-prevalent regions, contact with adults who are HIV+, homeless, IV drug user, NH residents, farm workers, or with active TB)    c.  Cholesterol screening: not applicable (AAP, AHA, and NCEP but not USPSTF recommend fasting lipid profile for h/o premature cardiovascular disease in a parent or grandparent less than 54years old; AAP but not USPSTF recommends total cholesterol if either parent has a cholesterol greater than 240)    d. STD screening: not applicable (indicated if sexually active)    3. Immunizations today: none  History of previous adverse reactions to immunizations? no    4. Follow-up visit in 1 year for next well-child visit, or sooner as needed.

## 2024-05-16 NOTE — PROGRESS NOTES
Database initiated. Patient is A&O independent from home with wife. States he uses no assistive devices and is RA at baseline.

## 2024-05-16 NOTE — TELEPHONE ENCOUNTER
----- Message from Hernandez Begum, DO sent at 5/16/2024  5:51 AM EDT -----  Let him know that the heart function is good.  The aortic valve replacement is working well.  He mild to moderate mitral regurgitation is moderate.  (Not much change.

## 2024-05-16 NOTE — PROGRESS NOTES
4 Eyes Skin Assessment     NAME:  Irving Butt  YOB: 1950  MEDICAL RECORD NUMBER:  [id number]    The patient is being assessed for  Admission    I agree that at least one RN has performed a thorough Head to Toe Skin Assessment on the patient. ALL assessment sites listed below have been assessed.      Areas assessed by both nurses:    Head, Face, Ears, Shoulders, Back, Chest, Arms, Elbows, Hands, Sacrum. Buttock, Coccyx, Ischium, Legs. Feet and Heels, and Under Medical Devices         Does the Patient have a Wound? No noted wound(s)       Dino Prevention initiated by RN: No  Wound Care Orders initiated by RN: No    Pressure Injury (Stage 3,4, Unstageable, DTI, NWPT, and Complex wounds) if present, place Wound referral order by RN under : No    New Ostomies, if present place, Ostomy referral order under : No     Nurse 1 eSignature: Electronically signed by Efe Botello RN on 5/16/24 at 2:07 PM EDT    **SHARE this note so that the co-signing nurse can place an eSignature**    Nurse 2 eSignature: Electronically signed by Jo-Ann Hodges RN on 5/17/24 at 11:59 AM EDT

## 2024-05-16 NOTE — PLAN OF CARE
Problem: ABCDS Injury Assessment  Goal: Absence of physical injury  Outcome: Progressing     Problem: Discharge Planning  Goal: Discharge to home or other facility with appropriate resources  Outcome: Progressing     Problem: Chronic Conditions and Co-morbidities  Goal: Patient's chronic conditions and co-morbidity symptoms are monitored and maintained or improved  Outcome: Progressing     Problem: Safety - Adult  Goal: Free from fall injury  Outcome: Progressing     Problem: Pain  Goal: Verbalizes/displays adequate comfort level or baseline comfort level  Outcome: Progressing

## 2024-05-16 NOTE — H&P
[facility] History and Physical      CHIEF COMPLAINT: Abdominal pain        History of Present Illness:  73-year-old gentleman with past medical history significant for rheumatoid arthritis, hypertension, hyperlipidemia, and, kidney stones and atrial fibrillation presents to emergency department with right abdominal pain and right upper flank pain for 1 week.  His pain progressively got worse today.  His pain is worsened by certain movements like sitting forward and taking deep breaths, no relieving factors..  No nausea vomiting or diarrhea.  He does have history of open heart surgery in 2020 and repair of aortic aneurysm.  He stated that his atrial fibrillation has been well-controlled and 6 months ago he was started on beta-blockers.    During his workup in ED his EKG showed significant bradycardia.  He denies any past history of low heart rate.  He stated that he was recently started on metoprolol for his atrial fibrillation.  Patient follows with cardiologist outpatient for atrial fibrillation.    Denies any chest pain, nausea vomiting diarrhea, no cough or shortness of breath, no lightheadedness or dizziness, no focal deficits, no fever or chills.    Informant(s) for H&P: Patient    REVIEW OF SYSTEMS:  A comprehensive review of systems was negative except for: what is in the HPI      PMH:  Past Medical History:   Diagnosis Date    Acute cystitis with hematuria 6/19/2023    Acute respiratory failure (HCC) 04/23/2022    Aneurysm artery, pulmonary (HCC)     Arthritis     rheumatoid    Asthma 1974    status asthmaticus in 3/2004.    Cancer (HCC)     basal cell to face    Community acquired pneumonia of left lower lobe of lung     resolved    Deviated septum     S/P repair x 3.    Gallbladder disease     Hepatitis B     Hypercholesterolemia     Intolerance to statins.    Hypertension     Kidney stone     Restless leg syndrome 03/2004    Negative sleep study for sleep apnea.  Positive for  Take 1 tablet by mouth daily    Sky Valentin MD   albuterol (PROVENTIL) (2.5 MG/3ML) 0.083% nebulizer solution Take 3 mLs by nebulization every 6 hours as needed for Wheezing 1/27/22   Gómez Aguilar DO   calcium carbonate 600 MG TABS tablet Take 1 tablet by mouth 2 times daily as needed    kSy Valentin MD   mometasone-formoterol (DULERA) 200-5 MCG/ACT inhaler Inhale 2 puffs into the lungs in the morning and 2 puffs in the evening.    Sky Valentin MD   fluticasone (FLONASE) 50 MCG/ACT nasal spray 1 spray by Each Nostril route daily    Sky Valentin MD   Multiple Vitamins-Minerals (PRESERVISION AREDS 2+MULTI VIT PO) Take 1 capsule by mouth daily    Sky Valentin MD   aspirin 81 MG chewable tablet Take 1 tablet by mouth daily    Sky Valentin MD   atorvastatin (LIPITOR) 10 MG tablet Take 1 tablet by mouth nightly    Sky Valentin MD       Allergies:    Statins [statins]    Social History:    reports that he quit smoking about 48 years ago. His smoking use included cigarettes. He started smoking about 58 years ago. He has a 24.9 pack-year smoking history. He has never used smokeless tobacco. He reports current alcohol use. He reports that he does not use drugs.    Family History:   family history is not on file. He was adopted.       PHYSICAL EXAM:  Vitals:  BP (!) 148/83   Pulse (!) 45   Temp 97.7 °F (36.5 °C) (Oral)   Resp 19   Ht 1.651 m (5' 5\")   Wt 65.3 kg (144 lb)   SpO2 99%   BMI 23.96 kg/m²     General Appearance: alert and oriented to person, place and time and in no acute distress  Skin: warm and dry  Head: normocephalic and atraumatic  Eyes: pupils equal, round, and reactive to light, extraocular eye movements intact, conjunctivae normal  Neck: neck supple and non tender without mass   Pulmonary/Chest: clear to auscultation bilaterally- no wheezes, rales or rhonchi, normal air movement, no respiratory distress  Cardiovascular: Bradycardic,

## 2024-05-16 NOTE — PROGRESS NOTES
Admitted today morning by the night physician, patient came to ED with right flank pain, patient with known enlarged prostate, prior UroLift surgery, hypertension, hyperlipidemia, kidney stones, s/p cholecystectomy, A-fib, noted bradycardic in the ED, sinus bradycardia likely related to use of beta-blockers per medicine and cardiology was consulted with beta-blockers on hold  Did receive call from nursing if patient cannot eat.  Came and evaluated, patient is awake, alert, sitting comfortably, starving, did say he came with right side flank pain, going to groin, did have CTs in the ED, at this time cardiology evaluation is pending, though bradycardia is better.  CTA negative for PE, per CT stable nephrolithiasis, will get renal ultrasound.  Also doubt if patient will require any cardiac testing now is still evaluation is pending, will not keep him starving at this time, nursing updated.  No charges submitted for today's visit.

## 2024-05-16 NOTE — ED NOTES
ED to Inpatient Handoff Report    Notified Lucina that electronic handoff available and patient ready for transport to room 429.    Safety Risks: Risk of falls    Patient in Restraints: no    Constant Observer or Patient : no    Telemetry Monitoring Ordered :Yes      Cardiac Rhythm: Sinus rhythm    Order to transfer to unit without monitor:YES    Last MEWS: 1 Time completed: 1241    Deterioration Index Score:   Predictive Model Details          22 (Normal)  Factor Value    Calculated 5/16/2024 12:48 59% Age 73 years old    Deterioration Index Model 18% Systolic 161     12% Respiratory rate 18     6% Pulse 51     3% Platelet count abnormal (125 k/uL)     1% Hematocrit abnormal (35.1 %)     1% Potassium 3.8 mmol/L     1% Sodium 138 mmol/L     0% Temperature 97.6 °F (36.4 °C)     0% WBC count 7.6 k/uL     0% Pulse oximetry 96 %        Vitals:    05/16/24 0720 05/16/24 0844 05/16/24 1037 05/16/24 1241   BP: (!) 153/78  (!) 153/86 (!) 161/93   Pulse: (!) 43 (!) 43 (!) 41 51   Resp: 23 12 18 18   Temp:    97.6 °F (36.4 °C)   TempSrc:    Oral   SpO2:  97% 97% 96%   Weight:       Height:             Opportunity for questions and clarification was provided.

## 2024-05-16 NOTE — PLAN OF CARE
Problem: ABCDS Injury Assessment  Goal: Absence of physical injury  5/16/2024 1939 by Pool Evans RN  Outcome: Progressing  5/16/2024 1938 by Pool Evans RN  Outcome: Progressing     Problem: Discharge Planning  Goal: Discharge to home or other facility with appropriate resources  5/16/2024 1939 by Pool Evans RN  Outcome: Progressing  5/16/2024 1938 by Pool Evans RN  Outcome: Progressing     Problem: Chronic Conditions and Co-morbidities  Goal: Patient's chronic conditions and co-morbidity symptoms are monitored and maintained or improved  5/16/2024 1939 by Pool Evans RN  Outcome: Progressing  5/16/2024 1938 by Pool Evans RN  Outcome: Progressing     Problem: Safety - Adult  Goal: Free from fall injury  5/16/2024 1939 by Pool Evans RN  Outcome: Progressing  5/16/2024 1938 by Pool Evans RN  Outcome: Progressing     Problem: Pain  Goal: Verbalizes/displays adequate comfort level or baseline comfort level  5/16/2024 1939 by Pool Evans RN  Outcome: Progressing  5/16/2024 1938 by Pool Evans RN  Outcome: Progressing

## 2024-05-16 NOTE — CONSULTS
Inpatient Cardiology Consultation      Reason for Consult: Bradycardia    Consulting Physician: Dr. Bain    Requesting Physician: Dr. Kelly    Date of Consultation: 5/16/2024    HISTORY OF PRESENT ILLNESS:     This 73-year-old male is known to [facility] cardiology and is followed by Dr. Begum.  He was last evaluated on March 4 of this year and has a history of paroxysmal atrial fibrillation since 2021 and nonobstructive coronary artery disease, bioprosthetic aortic valve with a TEVAR in June 2020 at the [facility] and a PFO closure and left atrial appendage ligation.  At this office visit he was stable and his heart rate was 55 bpm.    He presented to the emergency room today.  He was having right-sided flank pain and has a history of renal calculi.  He has been very active and cut the grass yesterday and exercised at cardiac rehab.  He had no dizziness, chest pain, dyspnea, orthopnea or swelling of the lower extremities.  His right-sided flank pain got worse so his wife brought him to the emergency room.    Blood pressure on admission 153/96 and heart rate 64 bpm but it has dropped to 38 bpm and he is afebrile at 97.7 degrees Fahrenheit and O2 saturation 98% on room air.    EKG on admission showed an incomplete right bundle branch block and marked sinus bradycardia with a heart rate of 44 bpm and sinus arrhythmia.  His prior EKG done on May 3 was 45 bpm heart rate    Potassium was 4 with a BUN and creatinine of 21 and 1, troponin 31-31, wbc 9.1, hh 13.7 and 42.9, D-dimer 918 and urinalysis unremarkable    CTA of the abdomen and pelvis showed a left nephrolithiasis and stable bilateral inguinal hernias    Due to his bradycardia and admission was recommended as he stated his heart rates were never that low before    Beta-blockers have been stopped and he has a left nephrolithiasis but not an acute issue.  He is to follow-up with urology as an outpatient.  He was treated with Toradol and Robaxin      Past  tablet by mouth every morning    Sky Valentin MD   albuterol (PROVENTIL) (2.5 MG/3ML) 0.083% nebulizer solution Take 3 mLs by nebulization every 6 hours as needed for Wheezing 1/27/22   Gómez Aguilar DO   mometasone-formoterol (DULERA) 200-5 MCG/ACT inhaler Inhale 2 puffs into the lungs in the morning and 2 puffs in the evening.    ProviderSky MD   fluticasone (FLONASE) 50 MCG/ACT nasal spray 1 spray by Each Nostril route daily as needed for Rhinitis or Allergies    Sky Valentin MD   Multiple Vitamins-Minerals (PRESERVISION AREDS 2+MULTI VIT) CAPS Take 1 capsule by mouth 2 times daily    Sky Valentin MD   aspirin 81 MG chewable tablet Take 1 tablet by mouth every morning    Sky Valentin MD   atorvastatin (LIPITOR) 10 MG tablet Take 1 tablet by mouth nightly    ProviderSky MD       Current Medications:    Current Facility-Administered Medications: sodium chloride flush 0.9 % injection 5-40 mL, 5-40 mL, IntraVENous, 2 times per day  sodium chloride flush 0.9 % injection 5-40 mL, 5-40 mL, IntraVENous, PRN  0.9 % sodium chloride infusion, , IntraVENous, PRN  potassium chloride (KLOR-CON M) extended release tablet 40 mEq, 40 mEq, Oral, PRN **OR** potassium bicarb-citric acid (EFFER-K) effervescent tablet 40 mEq, 40 mEq, Oral, PRN **OR** potassium chloride 10 mEq/100 mL IVPB (Peripheral Line), 10 mEq, IntraVENous, PRN  magnesium sulfate 2000 mg in 50 mL IVPB premix, 2,000 mg, IntraVENous, PRN  ondansetron (ZOFRAN-ODT) disintegrating tablet 4 mg, 4 mg, Oral, Q8H PRN **OR** ondansetron (ZOFRAN) injection 4 mg, 4 mg, IntraVENous, Q6H PRN  polyethylene glycol (GLYCOLAX) packet 17 g, 17 g, Oral, Daily PRN  acetaminophen (TYLENOL) tablet 650 mg, 650 mg, Oral, Q6H PRN **OR** acetaminophen (TYLENOL) suppository 650 mg, 650 mg, Rectal, Q6H PRN  0.9 % sodium chloride infusion, , IntraVENous, Continuous  apixaban (ELIQUIS) tablet 5 mg, 5 mg, Oral, BID  aspirin chewable

## 2024-05-17 LAB
ALBUMIN SERPL-MCNC: 3.8 G/DL (ref 3.5–5.2)
ALP SERPL-CCNC: 45 U/L (ref 40–129)
ALT SERPL-CCNC: 26 U/L (ref 0–40)
ANION GAP SERPL CALCULATED.3IONS-SCNC: 10 MMOL/L (ref 7–16)
AST SERPL-CCNC: 36 U/L (ref 0–39)
BASOPHILS # BLD: 0.08 K/UL (ref 0–0.2)
BASOPHILS NFR BLD: 1 % (ref 0–2)
BILIRUB SERPL-MCNC: 1.9 MG/DL (ref 0–1.2)
BUN SERPL-MCNC: 16 MG/DL (ref 6–23)
CALCIUM SERPL-MCNC: 8.6 MG/DL (ref 8.6–10.2)
CHLORIDE SERPL-SCNC: 108 MMOL/L (ref 98–107)
CO2 SERPL-SCNC: 22 MMOL/L (ref 22–29)
CREAT SERPL-MCNC: 0.9 MG/DL (ref 0.7–1.2)
EOSINOPHIL # BLD: 0.62 K/UL (ref 0.05–0.5)
EOSINOPHILS RELATIVE PERCENT: 8 % (ref 0–6)
ERYTHROCYTE [DISTWIDTH] IN BLOOD BY AUTOMATED COUNT: 13.2 % (ref 11.5–15)
GFR, ESTIMATED: >90 ML/MIN/1.73M2
GLUCOSE SERPL-MCNC: 68 MG/DL (ref 74–99)
HCT VFR BLD AUTO: 36.4 % (ref 37–54)
HGB BLD-MCNC: 11.7 G/DL (ref 12.5–16.5)
IMM GRANULOCYTES # BLD AUTO: <0.03 K/UL (ref 0–0.58)
IMM GRANULOCYTES NFR BLD: 0 % (ref 0–5)
LYMPHOCYTES NFR BLD: 2 K/UL (ref 1.5–4)
LYMPHOCYTES RELATIVE PERCENT: 26 % (ref 20–42)
MCH RBC QN AUTO: 32.1 PG (ref 26–35)
MCHC RBC AUTO-ENTMCNC: 32.1 G/DL (ref 32–34.5)
MCV RBC AUTO: 99.7 FL (ref 80–99.9)
MONOCYTES NFR BLD: 0.66 K/UL (ref 0.1–0.95)
MONOCYTES NFR BLD: 8 % (ref 2–12)
NEUTROPHILS NFR BLD: 57 % (ref 43–80)
NEUTS SEG NFR BLD: 4.45 K/UL (ref 1.8–7.3)
PLATELET # BLD AUTO: 125 K/UL (ref 130–450)
PMV BLD AUTO: 10.3 FL (ref 7–12)
POTASSIUM SERPL-SCNC: 3.8 MMOL/L (ref 3.5–5)
PROT SERPL-MCNC: 5.6 G/DL (ref 6.4–8.3)
RBC # BLD AUTO: 3.65 M/UL (ref 3.8–5.8)
SODIUM SERPL-SCNC: 140 MMOL/L (ref 132–146)
WBC OTHER # BLD: 7.8 K/UL (ref 4.5–11.5)

## 2024-05-17 PROCEDURE — 2060000000 HC ICU INTERMEDIATE R&B

## 2024-05-17 PROCEDURE — 51798 US URINE CAPACITY MEASURE: CPT

## 2024-05-17 PROCEDURE — 9900000038 HC CARDIAC REHAB PHASE 3 - MONTHLY

## 2024-05-17 PROCEDURE — 6370000000 HC RX 637 (ALT 250 FOR IP): Performed by: NURSE PRACTITIONER

## 2024-05-17 PROCEDURE — 87086 URINE CULTURE/COLONY COUNT: CPT

## 2024-05-17 PROCEDURE — 2580000003 HC RX 258: Performed by: STUDENT IN AN ORGANIZED HEALTH CARE EDUCATION/TRAINING PROGRAM

## 2024-05-17 PROCEDURE — 6370000000 HC RX 637 (ALT 250 FOR IP): Performed by: STUDENT IN AN ORGANIZED HEALTH CARE EDUCATION/TRAINING PROGRAM

## 2024-05-17 PROCEDURE — 94640 AIRWAY INHALATION TREATMENT: CPT

## 2024-05-17 PROCEDURE — 85025 COMPLETE CBC W/AUTO DIFF WBC: CPT

## 2024-05-17 PROCEDURE — 6360000002 HC RX W HCPCS: Performed by: STUDENT IN AN ORGANIZED HEALTH CARE EDUCATION/TRAINING PROGRAM

## 2024-05-17 PROCEDURE — 80053 COMPREHEN METABOLIC PANEL: CPT

## 2024-05-17 PROCEDURE — 99232 SBSQ HOSP IP/OBS MODERATE 35: CPT | Performed by: INTERNAL MEDICINE

## 2024-05-17 PROCEDURE — 99233 SBSQ HOSP IP/OBS HIGH 50: CPT | Performed by: INTERNAL MEDICINE

## 2024-05-17 RX ADMIN — ARFORMOTEROL TARTRATE 15 MCG: 15 SOLUTION RESPIRATORY (INHALATION) at 07:38

## 2024-05-17 RX ADMIN — HYDROCODONE BITARTRATE AND ACETAMINOPHEN 1 TABLET: 5; 325 TABLET ORAL at 20:36

## 2024-05-17 RX ADMIN — ACETAMINOPHEN 650 MG: 325 TABLET ORAL at 08:28

## 2024-05-17 RX ADMIN — SODIUM CHLORIDE, PRESERVATIVE FREE 10 ML: 5 INJECTION INTRAVENOUS at 20:32

## 2024-05-17 RX ADMIN — APIXABAN 5 MG: 5 TABLET, FILM COATED ORAL at 08:28

## 2024-05-17 RX ADMIN — ASPIRIN 81 MG CHEWABLE TABLET 81 MG: 81 TABLET CHEWABLE at 08:28

## 2024-05-17 RX ADMIN — METOPROLOL SUCCINATE 12.5 MG: 25 TABLET, EXTENDED RELEASE ORAL at 08:28

## 2024-05-17 RX ADMIN — APIXABAN 5 MG: 5 TABLET, FILM COATED ORAL at 20:37

## 2024-05-17 RX ADMIN — BUDESONIDE 250 MCG: 0.25 SUSPENSION RESPIRATORY (INHALATION) at 07:38

## 2024-05-17 RX ADMIN — ATORVASTATIN CALCIUM 10 MG: 10 TABLET, FILM COATED ORAL at 20:37

## 2024-05-17 RX ADMIN — BUDESONIDE 250 MCG: 0.25 SUSPENSION RESPIRATORY (INHALATION) at 20:38

## 2024-05-17 RX ADMIN — SODIUM CHLORIDE, PRESERVATIVE FREE 10 ML: 5 INJECTION INTRAVENOUS at 08:29

## 2024-05-17 RX ADMIN — ARFORMOTEROL TARTRATE 15 MCG: 15 SOLUTION RESPIRATORY (INHALATION) at 20:38

## 2024-05-17 ASSESSMENT — PAIN DESCRIPTION - PAIN TYPE: TYPE: ACUTE PAIN

## 2024-05-17 ASSESSMENT — PAIN - FUNCTIONAL ASSESSMENT: PAIN_FUNCTIONAL_ASSESSMENT: PREVENTS OR INTERFERES WITH MANY ACTIVE NOT PASSIVE ACTIVITIES

## 2024-05-17 ASSESSMENT — PAIN DESCRIPTION - DESCRIPTORS: DESCRIPTORS: ACHING;DISCOMFORT;POUNDING

## 2024-05-17 ASSESSMENT — PAIN DESCRIPTION - ONSET: ONSET: GRADUAL

## 2024-05-17 ASSESSMENT — PAIN DESCRIPTION - LOCATION: LOCATION: HEAD

## 2024-05-17 ASSESSMENT — PAIN SCALES - GENERAL
PAINLEVEL_OUTOF10: 7
PAINLEVEL_OUTOF10: 10

## 2024-05-17 NOTE — CONSULTS
05/15/24  1739 05/16/24  0532 05/17/24  0129   WBC 9.1 7.6 7.8   RBC 4.25 3.56* 3.65*   HGB 13.7 11.4* 11.7*   HCT 42.9 35.1* 36.4*   .9* 98.6 99.7   MCH 32.2 32.0 32.1   MCHC 31.9* 32.5 32.1   RDW 13.4 13.3 13.2    125* 125*   MPV 9.7 9.9 10.3         Recent Labs     05/15/24  1739 05/16/24  0532 05/17/24  0129   CREATININE 1.0 1.0 0.9      Latest Reference Range & Units 12/02/16 00:00 06/28/18 14:00 08/17/20 00:00 02/11/22 09:28 01/05/23 11:58 08/17/23 09:54   PSA 0.00 - 4.00 ng/mL 0.6 0.82 0.6 1.05 1.20 3.17      Latest Reference Range & Units 05/15/24 17:38   Color, UA Yellow  Yellow   Turbidity UA Clear  Clear   Glucose, Ur NEGATIVE mg/dL NEGATIVE   Bilirubin, Urine NEGATIVE  NEGATIVE   Ketones, Urine NEGATIVE mg/dL NEGATIVE   Specific Gravity, UA 1.005 - 1.030  1.020   Protein, UA NEGATIVE mg/dL NEGATIVE   Urobilinogen 0.0 - 1.0 EU/dL 0.2   Nitrite, Urine NEGATIVE  NEGATIVE   Leukocyte Esterase, Urine NEGATIVE  SMALL !   pH, Urine 5.0 - 9.0  6.0   WBC, UA 0 TO 5 /HPF 6 TO 9 !   RBC, UA 0 TO 2 /HPF 0 TO 2   Bacteria, UA None  1+ !   Urine Hgb NEGATIVE  NEGATIVE       EXAMINATION:  RETROPERITONEAL ULTRASOUND OF THE KIDNEYS AND URINARY BLADDER     5/16/2024     COMPARISON:  05/15/2024     HISTORY:  ORDERING SYSTEM PROVIDED HISTORY: possible stone  TECHNOLOGIST PROVIDED HISTORY:     Reason for exam:->possible stone  What reading provider will be dictating this exam?->CRC     FINDINGS:     Kidneys:     The right kidney measures 9.6 x 5.4 x 4.9 cm in length and the left kidney  measures 10.5 x 6.5 x 4.7 cm in length.     The left kidney demonstrates increased cortical echogenicity.  No evidence of  hydronephrosis or intrarenal stones.  A few simple bilateral renal cysts  measuring up to 2.8 cm.        Bladder:     Unremarkable appearance of the bladder.  The right ureteral jet was  identified.     IMPRESSION:  A few simple bilateral renal cysts that requires no further follow-up imaging.     Increased  cortical echogenicity of the left kidney suggestive of chronic  medical renal disease.     Normal bladder.                EXAMINATION:  CT OF THE ABDOMEN AND PELVIS WITHOUT CONTRAST 5/15/2024 2:58 pm     TECHNIQUE:  CT of the abdomen and pelvis was performed without the administration of  intravenous contrast. Multiplanar reformatted images are provided for review.  Automated exposure control, iterative reconstruction, and/or weight based  adjustment of the mA/kV was utilized to reduce the radiation dose to as low  as reasonably achievable.     COMPARISON:  The previous study performed 06/18/2023.     HISTORY:  ORDERING SYSTEM PROVIDED HISTORY: rigth flank pain  TECHNOLOGIST PROVIDED HISTORY:  Reason for exam:->rigth flank pain  Additional Contrast?->None  Decision Support Exception - unselect if not a suspected or confirmed  emergency medical condition->Emergency Medical Condition (MA)     FINDINGS:  Lower Chest: Tiny bilateral lower lobe calcified lung granulomata are again  noted.  The patient is again status post cardiac surgery.     Organs: A tiny left lobe liver cyst is again seen.  Previously noted  hypodensities in the spleen are less apparent.  The patient is status post  cholecystectomy.  The biliary tree, pancreas, and adrenal glands are  unremarkable.  Bilateral renal cortical cysts are again noted, including a  complex cyst with thin calcific septations in the upper pole of the left  kidney.  Left nephrolithiasis is again seen.  No ureteral or urinary bladder  calculi are noted.     GI/Bowel: The stomach is suboptimally distended and grossly unremarkable.  The small bowel is normal in appearance for a non oral contrast study.  Colonic diverticulosis is again appreciated.  The appendix is normal in  appearance.     Pelvis: The urinary bladder is suboptimally distended, but grossly  unremarkable.  The seminal vesicles are symmetric in size.  The patient is  again status post Uro lift procedure.

## 2024-05-17 NOTE — CARE COORDINATION
Social Work/Discharge Planning:  Met with patient and completed initial assessment.  Explained Social Work role and discussed transition of care/discharge planning.  Patient lives with his wife in a two story house.  PTA he is independent with no adaptive device.  He has a nebulizer, walker and wheelchair at home.  He has a home health care history with [facility].  He denies any history at a skilled nursing facility.  He has cardiac rehabilitation three times a week at [facility].  He plans to return home at discharge and denies any home care needs at this time.  Will continue to follow and assist if needed.  Electronically signed by ZACHARY Calderón on 5/17/2024 at 11:45 AM

## 2024-05-17 NOTE — PROGRESS NOTES
Inpatient Cardiology Consultation      Reason for Consult: Bradycardia    Consulting Physician: Dr. Bain    Requesting Physician: Dr. Kelly    Date of Consultation: 5/17/2024    HISTORY OF PRESENT ILLNESS:     This 73-year-old male is known to [facility] cardiology and is followed by Dr. Begum.  He was last evaluated on March 4 of this year and has a history of paroxysmal atrial fibrillation since 2021 and nonobstructive coronary artery disease, bioprosthetic aortic valve with a TEVAR in June 2020 at the [facility] and a PFO closure and left atrial appendage ligation.  At this office visit he was stable and his heart rate was 55 bpm.    He presented to the emergency room today.  He was having right-sided flank pain and has a history of renal calculi.  He has been very active and cut the grass yesterday and exercised at cardiac rehab.  He had no dizziness, chest pain, dyspnea, orthopnea or swelling of the lower extremities.  His right-sided flank pain got worse so his wife brought him to the emergency room.    Blood pressure on admission 153/96 and heart rate 64 bpm but it has dropped to 38 bpm and he is afebrile at 97.7 degrees Fahrenheit and O2 saturation 98% on room air.    Interim:  Blood pressure and heart rate now uncontrolled        Past medical history  Former smoker  Cardiac catheterization in 1993 with nonobstructive CAD  Gastritis and GERD  H. pylori infection in 2012  Hyperlipidemia  Hypertension  Macular degeneration  June 2020 and status post bio Bentall aortic valve replacement and repair of ascending aortic aneurysm in [place] by Dr. Kohli  Paroxysmal atrial fibrillation and the patient states he was taken off beta-blockers and apixaban but that those medications were later restarted by Dr. Begum  2D echocardiogram in 2023    ECHO Collected: 3/22/2023 10:53 AM (Final result)  Impression: CONCLUSIONS:  - Exam indication: Hx of AVR  - The left ventricle is normal in size. Left ventricular  palpable masses no organomegaly; no abdominal bruit  MS: Good muscle strength and tone. No atrophy or abnormal movements.   : Deferred  SKIN: Warm and dry no statis dermatitis or ulcers   NEURO / PSYCH: Oriented to person, place and time. Speech clear and appropriate. Follows all commands. Pleasant affect     DATA:    ECG / Tele strips: please see HPI   Diagnostic:      Intake/Output Summary (Last 24 hours) at 5/17/2024 1837  Last data filed at 5/17/2024 1649  Gross per 24 hour   Intake 240 ml   Output 30 ml   Net 210 ml       Labs:   CBC:   Recent Labs     05/16/24  0532 05/17/24  0129   WBC 7.6 7.8   HGB 11.4* 11.7*   HCT 35.1* 36.4*   * 125*     BMP:   Recent Labs     05/16/24  0532 05/17/24  0129    140   K 3.8 3.8   CO2 21* 22   BUN 21 16   CREATININE 1.0 0.9   LABGLOM 82 >90   CALCIUM 8.7 8.6     Mag: No results for input(s): \"MG\" in the last 72 hours.  Phos: No results for input(s): \"PHOS\" in the last 72 hours.  TFT:   Lab Results   Component Value Date    TSH 1.44 05/16/2024    L3JOGPC 8.1 02/11/2022      HgA1c:   Lab Results   Component Value Date/Time    LABA1C 5.5 02/19/2024 07:07 AM    LABA1C 5.2 08/17/2023 09:54 AM    LABA1C 5.8 02/20/2023 09:32 AM     No results found for: \"EAG\"  proBNP:   Lab Results   Component Value Date/Time    PROBNP 571 06/05/2022 01:22 AM    PROBNP 1,662 07/08/2020 11:33 AM     PT/INR: No results for input(s): \"PROTIME\", \"INR\" in the last 72 hours.  APTT:No results for input(s): \"APTT\" in the last 72 hours.  TROPONIN:  Lab Results   Component Value Date/Time    TROPHS 31 05/15/2024 08:09 PM    TROPHS 31 05/15/2024 05:50 PM    TROPHS 25 05/03/2023 11:42 AM    TROPHS 28 05/03/2023 09:30 AM    TROPHS 19 06/05/2022 03:04 AM     CK:  Lab Results   Component Value Date/Time    CKTOTAL 102 06/10/2013 12:30 PM     FASTING LIPID PANEL:  Lab Results   Component Value Date/Time    CHOL 135 02/19/2024 07:07 AM    CHOL 127 02/20/2023 09:32 AM    HDL 59 02/19/2024 07:07 AM

## 2024-05-17 NOTE — PROGRESS NOTES
[facility]   [facility] Note    Admitting Date and Time: 5/15/2024  4:37 PM  Admit Dx: Bradycardia [R00.1]  Right flank pain [R10.9]  Longstanding persistent atrial fibrillation (HCC) [I48.11]    Subjective: Admitted on 16th, presented with flank pain, known prostatic enlargement, prior UroLift surgery, hypertension, hyperlipidemia, kidney stones, s/p cholecystectomy, A-fib, was noted bradycardic in the ED, sinus bradycardia, beta-blockers were kept on hold, CTA negative for PE on presentation, abdominal CT with stable nephrolithiasis, patient seen by his primary cardiologist, patient who is s/p aortic aneurysm and aortic arch repair, dose of metoprolol succinate was decreased to 12.5 from 25, renal ultrasound okay, heart rate of 47 today morning, cardiology do plan for 14-day Zio patch.     Patient was admitted with Bradycardia [R00.1]  Right flank pain [R10.9]  Longstanding persistent atrial fibrillation (HCC) [I48.11]. Patient feels still significant pain, which made him come to the ER, pain located in the right lateral aspect of T11/T12 region.    Per RN: Patient has been seen by urology.    ROS: denies fever, chills, cp, sob, n/v, HA unless stated above.     sodium chloride flush  5-40 mL IntraVENous 2 times per day    apixaban  5 mg Oral BID    aspirin  81 mg Oral Daily    atorvastatin  10 mg Oral Nightly    budesonide  0.25 mg Nebulization BID RT    And    arformoterol tartrate  15 mcg Nebulization BID RT    metoprolol succinate  12.5 mg Oral Daily     sodium chloride flush, 5-40 mL, PRN  sodium chloride, , PRN  potassium chloride, 40 mEq, PRN   Or  potassium alternative oral replacement, 40 mEq, PRN   Or  potassium chloride, 10 mEq, PRN  magnesium sulfate, 2,000 mg, PRN  ondansetron, 4 mg, Q8H PRN   Or  ondansetron, 4 mg, Q6H PRN  polyethylene glycol, 17 g, Daily PRN  acetaminophen, 650 mg, Q6H PRN   Or  acetaminophen, 650 mg, Q6H PRN  HYDROcodone 5 mg - acetaminophen, 1 tablet,  abnormality.         CT ABDOMEN PELVIS WO CONTRAST Additional Contrast? None   Final Result   1. Stable left nephrolithiasis. No ureteral or urinary bladder calculi are   noted.  This is when compared to the previous study performed 06/18/2023.   2. Stable bilateral renal cortical cysts, including a complex cyst with thin   calcific septations in the upper pole of the left kidney.   3. Stable colonic diverticulosis.   4. Stable bilateral inguinal hernias, left larger than right. The left   inguinal hernia contains fat and nonobstructed large bowel.   5. Other stable findings, as above.             Assessment:    Principal Problem:    Bradycardia  Resolved Problems:    * No resolved hospital problems. *      Plan:  Right flank pain, patient seen by urology, more suggestive of musculoskeletal source, multiple CTs in the ED negative, concern over possible pinched nerve, MRI had been requested.  Bradycardic, noted in ED, dose of metoprolol was decreased to 12.5 from cardiology side, patient still at times remaining bradycardic, input from cardiology pending.  A-fib, patient is on OAC, will defer rate control to cardiology.  Hyperlipidemia, remains on statins.  DC planning, possibly tomorrow assuming okay from cardiology side, definitive answer on the workup, if MRI negative then we will treat him only for pulled muscle and see improvement with outpatient follow-up with PCP.        Electronically signed by Crystal Daniels MD on 5/17/2024 at 8:43 AM

## 2024-05-17 NOTE — ACP (ADVANCE CARE PLANNING)
Advance Care Planning   Healthcare Decision Maker:    Primary Decision Maker: Padmini Butt - [facility] - [phone]    Click here to complete Healthcare Decision Makers including selection of the Healthcare Decision Maker Relationship (ie \"Primary\").  Today we documented Decision Maker(s) consistent with Legal Next of Kin hierarchy.

## 2024-05-18 ENCOUNTER — APPOINTMENT (OUTPATIENT)
Dept: MRI IMAGING | Age: 74
End: 2024-05-18
Payer: MEDICARE

## 2024-05-18 VITALS
TEMPERATURE: 97.9 F | HEIGHT: 65 IN | RESPIRATION RATE: 17 BRPM | HEART RATE: 57 BPM | WEIGHT: 145.94 LBS | OXYGEN SATURATION: 99 % | DIASTOLIC BLOOD PRESSURE: 74 MMHG | BODY MASS INDEX: 24.32 KG/M2 | SYSTOLIC BLOOD PRESSURE: 168 MMHG

## 2024-05-18 LAB
ALBUMIN SERPL-MCNC: 3.8 G/DL (ref 3.5–5.2)
ALP SERPL-CCNC: 44 U/L (ref 40–129)
ALT SERPL-CCNC: 22 U/L (ref 0–40)
ANION GAP SERPL CALCULATED.3IONS-SCNC: 10 MMOL/L (ref 7–16)
AST SERPL-CCNC: 28 U/L (ref 0–39)
BASOPHILS # BLD: 0.06 K/UL (ref 0–0.2)
BASOPHILS NFR BLD: 1 % (ref 0–2)
BILIRUB SERPL-MCNC: 1.3 MG/DL (ref 0–1.2)
BUN SERPL-MCNC: 17 MG/DL (ref 6–23)
CALCIUM SERPL-MCNC: 8.5 MG/DL (ref 8.6–10.2)
CHLORIDE SERPL-SCNC: 109 MMOL/L (ref 98–107)
CO2 SERPL-SCNC: 22 MMOL/L (ref 22–29)
CREAT SERPL-MCNC: 0.9 MG/DL (ref 0.7–1.2)
EOSINOPHIL # BLD: 0.55 K/UL (ref 0.05–0.5)
EOSINOPHILS RELATIVE PERCENT: 8 % (ref 0–6)
ERYTHROCYTE [DISTWIDTH] IN BLOOD BY AUTOMATED COUNT: 13.2 % (ref 11.5–15)
GFR, ESTIMATED: >90 ML/MIN/1.73M2
GLUCOSE SERPL-MCNC: 97 MG/DL (ref 74–99)
HCT VFR BLD AUTO: 35.7 % (ref 37–54)
HGB BLD-MCNC: 11.7 G/DL (ref 12.5–16.5)
IMM GRANULOCYTES # BLD AUTO: 0.03 K/UL (ref 0–0.58)
IMM GRANULOCYTES NFR BLD: 0 % (ref 0–5)
LYMPHOCYTES NFR BLD: 1.95 K/UL (ref 1.5–4)
LYMPHOCYTES RELATIVE PERCENT: 27 % (ref 20–42)
MCH RBC QN AUTO: 32.4 PG (ref 26–35)
MCHC RBC AUTO-ENTMCNC: 32.8 G/DL (ref 32–34.5)
MCV RBC AUTO: 98.9 FL (ref 80–99.9)
MONOCYTES NFR BLD: 0.66 K/UL (ref 0.1–0.95)
MONOCYTES NFR BLD: 9 % (ref 2–12)
NEUTROPHILS NFR BLD: 55 % (ref 43–80)
NEUTS SEG NFR BLD: 3.97 K/UL (ref 1.8–7.3)
PLATELET # BLD AUTO: 122 K/UL (ref 130–450)
PMV BLD AUTO: 10.2 FL (ref 7–12)
POTASSIUM SERPL-SCNC: 3.7 MMOL/L (ref 3.5–5)
PROT SERPL-MCNC: 5.5 G/DL (ref 6.4–8.3)
RBC # BLD AUTO: 3.61 M/UL (ref 3.8–5.8)
SODIUM SERPL-SCNC: 141 MMOL/L (ref 132–146)
WBC OTHER # BLD: 7.2 K/UL (ref 4.5–11.5)

## 2024-05-18 PROCEDURE — 85025 COMPLETE CBC W/AUTO DIFF WBC: CPT

## 2024-05-18 PROCEDURE — 80053 COMPREHEN METABOLIC PANEL: CPT

## 2024-05-18 PROCEDURE — 99239 HOSP IP/OBS DSCHRG MGMT >30: CPT | Performed by: INTERNAL MEDICINE

## 2024-05-18 PROCEDURE — 72148 MRI LUMBAR SPINE W/O DYE: CPT

## 2024-05-18 PROCEDURE — 6370000000 HC RX 637 (ALT 250 FOR IP): Performed by: STUDENT IN AN ORGANIZED HEALTH CARE EDUCATION/TRAINING PROGRAM

## 2024-05-18 PROCEDURE — 6360000002 HC RX W HCPCS: Performed by: STUDENT IN AN ORGANIZED HEALTH CARE EDUCATION/TRAINING PROGRAM

## 2024-05-18 PROCEDURE — 2580000003 HC RX 258: Performed by: STUDENT IN AN ORGANIZED HEALTH CARE EDUCATION/TRAINING PROGRAM

## 2024-05-18 PROCEDURE — 6370000000 HC RX 637 (ALT 250 FOR IP): Performed by: NURSE PRACTITIONER

## 2024-05-18 PROCEDURE — 94640 AIRWAY INHALATION TREATMENT: CPT

## 2024-05-18 PROCEDURE — 72146 MRI CHEST SPINE W/O DYE: CPT

## 2024-05-18 RX ORDER — IBUPROFEN 600 MG/1
600 TABLET ORAL 3 TIMES DAILY
Qty: 9 TABLET | Refills: 0 | Status: SHIPPED | OUTPATIENT
Start: 2024-05-18 | End: 2024-05-21

## 2024-05-18 RX ORDER — METOPROLOL SUCCINATE 25 MG/1
12.5 TABLET, EXTENDED RELEASE ORAL DAILY
Qty: 30 TABLET | Refills: 3 | Status: SHIPPED | OUTPATIENT
Start: 2024-05-19

## 2024-05-18 RX ORDER — AMLODIPINE BESYLATE 5 MG/1
5 TABLET ORAL DAILY
Qty: 30 TABLET | Refills: 3 | Status: SHIPPED | OUTPATIENT
Start: 2024-05-18

## 2024-05-18 RX ORDER — HYDROCODONE BITARTRATE AND ACETAMINOPHEN 5; 325 MG/1; MG/1
1 TABLET ORAL EVERY 6 HOURS PRN
Qty: 15 TABLET | Refills: 0 | Status: SHIPPED | OUTPATIENT
Start: 2024-05-18 | End: 2024-05-23

## 2024-05-18 RX ORDER — CYCLOBENZAPRINE HCL 5 MG
5 TABLET ORAL 3 TIMES DAILY
Qty: 15 TABLET | Refills: 0 | Status: SHIPPED | OUTPATIENT
Start: 2024-05-18 | End: 2024-05-23

## 2024-05-18 RX ADMIN — BUDESONIDE 250 MCG: 0.25 SUSPENSION RESPIRATORY (INHALATION) at 07:56

## 2024-05-18 RX ADMIN — METOPROLOL SUCCINATE 12.5 MG: 25 TABLET, EXTENDED RELEASE ORAL at 08:44

## 2024-05-18 RX ADMIN — APIXABAN 5 MG: 5 TABLET, FILM COATED ORAL at 08:44

## 2024-05-18 RX ADMIN — ASPIRIN 81 MG CHEWABLE TABLET 81 MG: 81 TABLET CHEWABLE at 08:44

## 2024-05-18 RX ADMIN — ARFORMOTEROL TARTRATE 15 MCG: 15 SOLUTION RESPIRATORY (INHALATION) at 07:56

## 2024-05-18 RX ADMIN — SODIUM CHLORIDE, PRESERVATIVE FREE 10 ML: 5 INJECTION INTRAVENOUS at 08:43

## 2024-05-18 ASSESSMENT — PAIN SCALES - GENERAL: PAINLEVEL_OUTOF10: 4

## 2024-05-18 NOTE — PROGRESS NOTES
SPIRITUAL HEALTH SERVICES - [facility]  PROGRESS NOTE    Name: Irving Butt                Pentecostal: Congregation   Anointed (Last Rites): No    Referral: Routine Visit    Assessment:  Upon entering the room  observes patient lying in bed.  Patient was calm and approachable and welcoming to the .      Intervention:   provided active listening, prayer, and nurtured hope.  left devotional material and information about  services.    Outcome:  Patient seemed encouraged, engaged in conversation, and expressed gratitude.    Plan:  Chaplains will remain available to offer spiritual and emotional support as needed.      Electronically signed by Chaplain Naomi, on 5/18/2024 at 1:18 PM.  Spiritual Care Department  [facility]  [phone]

## 2024-05-18 NOTE — DISCHARGE SUMMARY
[facility] Hospitalist       Hospitalist Physician Discharge Summary       No follow-up provider specified.    Activity level: As tolerated    Diet: ADULT DIET; Regular; No Caffeine    Labs:    Dispo: Home    Condition at discharge: Stable    Continue supplemental oxygen via nasal canula @ 2 LPM round-the-clock.    Continue CPAP / BiPAP during sleep as prior to admission.    Patient ID:  Irving Butt  [id number]  73 y.o.  1950    Admit date: 5/15/2024    Discharge date and time:  5/18/2024  3:28 PM    Admission Diagnoses: Principal Problem:    Bradycardia  Resolved Problems:    * No resolved hospital problems. *      Discharge Diagnoses: Principal Problem:    Bradycardia  Resolved Problems:    * No resolved hospital problems. *      Consults:  IP CONSULT TO CARDIOLOGY  IP CONSULT TO UROLOGY    Procedures: MRI    Hospital Course: Patient was admitted with Bradycardia [R00.1]  Right flank pain [R10.9]  Longstanding persistent atrial fibrillation (HCC) [I48.11]. Patient Admitted on 16th, presented with flank pain, known prostatic enlargement, prior UroLift surgery, hypertension, hyperlipidemia, kidney stones, s/p cholecystectomy, A-fib, was noted bradycardic in the ED, sinus bradycardia, beta-blockers were kept on hold, CTA negative for PE on presentation, abdominal CT with stable nephrolithiasis, patient seen by his primary cardiologist, patient who is s/p aortic aneurysm and aortic arch repair, dose of metoprolol succinate was decreased to 12.5 from 25, renal ultrasound okay, heart rate of 47 today morning, cardiology do plan for 14-day Zio patch.  Impression from urology of musculoskeletal pain.  MRI with multilevel neural foraminal stenosis, severe at right L5-S1 level.  Patient at this time is awake, alert, comfortable, spoke with him at length and outlined the plan, spoke with primary nurse present in the room, he will be discharged on 12.5 of metoprolol, Norvasc 5 will be added as BP control is  your doctor or other care provider to review them with you.                   Where to Get Your Medications        These medications were sent to [id number] - [facility] [address] - P [phone] - F [phone] [address] [place] [address]      Phone: [phone]   amLODIPine 5 MG tablet  cyclobenzaprine 5 MG tablet  ibuprofen 600 MG tablet  metoprolol succinate 25 MG extended release tablet       You can get these medications from any pharmacy    Bring a paper prescription for each of these medications  HYDROcodone-acetaminophen 5-325 MG per tablet           Note that more than 30 minutes was spent in preparing discharge papers, discussing discharge with patient, medication review, etc.    Signed:  Electronically signed by Crystal Daniels MD on 5/18/2024 at 3:28 PM    NOTE: This report was transcribed using voice recognition software. Every effort was made to ensure accuracy; however, inadvertent computerized transcription errors may be present.

## 2024-05-19 LAB
MICROORGANISM SPEC CULT: ABNORMAL
SPECIMEN DESCRIPTION: ABNORMAL

## 2024-05-20 ENCOUNTER — CARE COORDINATION (OUTPATIENT)
Dept: CARE COORDINATION | Age: 74
End: 2024-05-20

## 2024-05-20 ENCOUNTER — TELEPHONE (OUTPATIENT)
Dept: ADMINISTRATIVE | Age: 74
End: 2024-05-20

## 2024-05-20 ENCOUNTER — HOSPITAL ENCOUNTER (OUTPATIENT)
Dept: CARDIAC REHAB | Age: 74
Discharge: HOME | End: 2024-05-20

## 2024-05-20 NOTE — TELEPHONE ENCOUNTER
Pt called to schedule hospital FU appt w/Dr Begum; discharged 5/18/24 low heart rate;  please advise scheduling

## 2024-05-20 NOTE — CARE COORDINATION
Care Transitions Initial Follow Up Call    Call within 2 business days of discharge: Yes    Patient Current Location:  Home: [address] [place] [id number]    Care Transition Nurse contacted the patient by telephone to perform post hospital discharge assessment. Verified name and  with patient as identifiers. Provided introduction to self, and explanation of the Care Transition Nurse role.     Patient: Irving Butt Patient : 1950   MRN: [id number]  Reason for Admission: SEBZ -24 Bradycardia  Discharge Date: 24 RARS: Readmission Risk Score: 11.2    Last Discharge Facility       Date Complaint Diagnosis Description Type Department Provider    5/15/24 Flank Pain Bradycardia ... ED to Hosp-Admission (Discharged) (ADMITTED) Crystal Mcgovern MD; M...        Was this an external facility discharge? No Discharge Facility: [facility]    Challenges to be reviewed by the provider   Additional needs identified to be addressed with provider: No  none               Method of communication with provider: none.    CTN placed call to Patient for Initial Care Transition call, Post Hospital discharge. Pt states doing well. Pt denies CP, SOB, TAPIA, lightheadedness, dizziness, fatigue. Pt wears O2 @ 2L and CPAP at HS. Pt reports he continues to have Right sided flank/back pain which he states is d/t a pinched nerve. Pt states is relieved with rest, and alternating heat/ice.     Medication Review completed. Pt admits to not taking Norco/Flexeril.     Pt goes to Cardiac Rehab 3 x weekly. Has a call out to Dr Begum's office to schedule HFU and possible Zio Monitor. Pt declined scheduling PCP HFU. CTN routed message High Priority to PCP Clinical Staff to schedule 7 day Hosp F/U.    Medication Review completed. Pt admits to not taking Norco/Flexeril.     Pt denies Transportation, Home, or Medication needs. . Instructed to contact PCP/Specialist or present to ED for new or worsening symptoms. Pt

## 2024-05-23 ENCOUNTER — OFFICE VISIT (OUTPATIENT)
Dept: PRIMARY CARE CLINIC | Age: 74
End: 2024-05-23

## 2024-05-23 VITALS
HEART RATE: 47 BPM | WEIGHT: 144 LBS | OXYGEN SATURATION: 97 % | TEMPERATURE: 97.6 F | RESPIRATION RATE: 17 BRPM | BODY MASS INDEX: 23.96 KG/M2

## 2024-05-23 DIAGNOSIS — M54.50 ACUTE RIGHT-SIDED LOW BACK PAIN WITHOUT SCIATICA: ICD-10-CM

## 2024-05-23 DIAGNOSIS — R00.1 BRADYCARDIA: ICD-10-CM

## 2024-05-23 DIAGNOSIS — Z09 HOSPITAL DISCHARGE FOLLOW-UP: Primary | ICD-10-CM

## 2024-05-23 DIAGNOSIS — M51.16 HERNIATION OF LUMBAR INTERVERTEBRAL DISC WITH RADICULOPATHY: ICD-10-CM

## 2024-05-23 NOTE — PROGRESS NOTES
Post-Discharge Transitional Care  Follow Up      Irving Butt   YOB: 1950    Date of Office Visit:  5/23/2024  Date of Hospital Admission: 5/15/24  Date of Hospital Discharge: 5/18/24  Risk of hospital readmission (high >=14%. Medium >=10%) :Readmission Risk Score: 11.2      Care management risk score Rising risk (score 2-5) and Complex Care (Scores >=6): No Risk Score On File     Non face to face  following discharge, date last encounter closed (first attempt may have been earlier): 05/20/2024    Call initiated 2 business days of discharge: Yes    ASSESSMENT/PLAN:   Hospital discharge follow-up  -     VT DISCHARGE MEDS RECONCILED W/ CURRENT OUTPATIENT MED LIST  Herniation of lumbar intervertebral disc with radiculopathy  -     External Referral To Pain Clinic  Bradycardia  Acute right-sided low back pain without sciatica  -     External Referral To Pain Clinic      Medical Decision Making: high complexity  No follow-ups on file.    On this date 5/23/2024 I have spent 45 minutes reviewing previous notes, test results and face to face with the patient discussing the diagnosis and importance of compliance with the treatment plan as well as documenting on the day of the visit.       Subjective:   HPI:  Follow up of Hospital problems/diagnosis(es): [place] back pain    Inpatient course: Discharge summary reviewed- see chart.    Interval history/Current status: went to er with lwo back pain and  found low heart rate     Recduced metop   12,5   and added norvasc for bp    Lwo back pain better       Never had that before    Patient Active Problem List   Diagnosis    Lumbar spinal stenosis    Essential hypertension    Kidney cyst, acquired    Basal cell carcinoma of skin of face    Coronary atherosclerosis    MR (mitral regurgitation)    GERD (gastroesophageal reflux disease)    Hyperlipemia    Macular degeneration    Asthma    AR (aortic regurgitation)    Heart valve disease    Pulmonary emphysema (HCC)

## 2024-06-10 PROCEDURE — 9900000038 HC CARDIAC REHAB PHASE 3 - MONTHLY

## 2024-06-11 ENCOUNTER — CARE COORDINATION (OUTPATIENT)
Dept: CARE COORDINATION | Age: 74
End: 2024-06-11

## 2024-06-11 NOTE — CARE COORDINATION
Care Transitions Note  Final Call     Patient Current Location:  Home: [address] [place] [address]    Care Transition Nurse contacted the patient by telephone. Verified name and  as identifiers.    Patient graduated from the Care Transitions program on 24.  Patient/family verbalizes confidence in the ability to self-manage at this time..      Advance Care Planning:   Does patient have an Advance Directive: reviewed and current.    Handoff:   Patient was not referred to the ACM team due to no additional needs identified.       Care Summary Note: Spoke with patient today 24 for final ISAAC/hospital discharge final follow up call for bradycardia.     Patient states he is doing well and offers no complaints. Denies any dizziness, light headedness or feeling faint. Denies any shortness of breath, chest pain, chest discomfort or palpitations. Confirmed with patient he hernandez NOT wear home oxygen (COPD).     Patient states he continues to tolerate Amlodipine that was ordered on hosp discharge and remains on Toprol 12.5 mg daily. Confirmed with patient he completed HFU appt with Dr. Aguilar (PCP) on 24 and is scheduled to f/u with Dr. Begum (cardio) on 24.     Patient states he plans on keeping appt with Dr. Kohli (Cardiology) at [facility] on 7/3/24.     Patient denies any complaints or needs and in agreement to final call. CTN signing off for Care Transition     Assessments:  Care Transitions Subsequent and Final Call    Subsequent and Final Calls  Do you have any ongoing symptoms?: No  Have your medications changed?: No  Do you have any questions related to your medications?: No  Do you currently have any active services?: Yes  Are you currently active with any services?: Outpatient/Community Services  Do you have any needs or concerns that I can assist you with?: No  Identified Barriers: None  Care Transitions Interventions  No Identified Needs  Other Interventions:              Upcoming

## 2024-06-20 ENCOUNTER — OFFICE VISIT (OUTPATIENT)
Dept: PRIMARY CARE CLINIC | Age: 74
End: 2024-06-20

## 2024-06-20 VITALS
WEIGHT: 144 LBS | TEMPERATURE: 98.2 F | SYSTOLIC BLOOD PRESSURE: 124 MMHG | DIASTOLIC BLOOD PRESSURE: 78 MMHG | BODY MASS INDEX: 23.99 KG/M2 | HEART RATE: 75 BPM | OXYGEN SATURATION: 97 % | HEIGHT: 65 IN

## 2024-06-20 DIAGNOSIS — J01.80 ACUTE NON-RECURRENT SINUSITIS OF OTHER SINUS: Primary | ICD-10-CM

## 2024-06-20 DIAGNOSIS — I10 ESSENTIAL HYPERTENSION: Chronic | ICD-10-CM

## 2024-06-20 DIAGNOSIS — J20.8 ACUTE BRONCHITIS DUE TO OTHER SPECIFIED ORGANISMS: ICD-10-CM

## 2024-06-20 RX ORDER — DEXTROMETHORPHAN HYDROBROMIDE AND PROMETHAZINE HYDROCHLORIDE 15; 6.25 MG/5ML; MG/5ML
5 SYRUP ORAL 4 TIMES DAILY PRN
Qty: 120 ML | Refills: 0 | Status: SHIPPED | OUTPATIENT
Start: 2024-06-20 | End: 2024-06-27

## 2024-06-20 RX ORDER — PREDNISONE 10 MG/1
TABLET ORAL
Qty: 18 TABLET | Refills: 0 | Status: SHIPPED | OUTPATIENT
Start: 2024-06-20

## 2024-06-20 RX ORDER — CEFDINIR 300 MG/1
300 CAPSULE ORAL 2 TIMES DAILY
Qty: 20 CAPSULE | Refills: 0 | Status: SHIPPED | OUTPATIENT
Start: 2024-06-20 | End: 2024-06-30

## 2024-06-20 SDOH — ECONOMIC STABILITY: FOOD INSECURITY: WITHIN THE PAST 12 MONTHS, YOU WORRIED THAT YOUR FOOD WOULD RUN OUT BEFORE YOU GOT MONEY TO BUY MORE.: NEVER TRUE

## 2024-06-20 SDOH — ECONOMIC STABILITY: FOOD INSECURITY: WITHIN THE PAST 12 MONTHS, THE FOOD YOU BOUGHT JUST DIDN'T LAST AND YOU DIDN'T HAVE MONEY TO GET MORE.: NEVER TRUE

## 2024-06-20 SDOH — ECONOMIC STABILITY: INCOME INSECURITY: HOW HARD IS IT FOR YOU TO PAY FOR THE VERY BASICS LIKE FOOD, HOUSING, MEDICAL CARE, AND HEATING?: NOT HARD AT ALL

## 2024-06-20 ASSESSMENT — PATIENT HEALTH QUESTIONNAIRE - PHQ9
SUM OF ALL RESPONSES TO PHQ9 QUESTIONS 1 & 2: 0
1. LITTLE INTEREST OR PLEASURE IN DOING THINGS: NOT AT ALL
2. FEELING DOWN, DEPRESSED OR HOPELESS: NOT AT ALL
SUM OF ALL RESPONSES TO PHQ QUESTIONS 1-9: 0

## 2024-06-20 ASSESSMENT — ENCOUNTER SYMPTOMS
EYES NEGATIVE: 1
GASTROINTESTINAL NEGATIVE: 1
ALLERGIC/IMMUNOLOGIC NEGATIVE: 1
RHINORRHEA: 1
COUGH: 1

## 2024-06-20 NOTE — PROGRESS NOTES
24  Name: Irving Butt    : 1950    Sex: male    Age: 73 y.o.        Subjective:  Chief Complaint: Patient is here for cough yuly sinus mucus     Ten d  no t  sw  ch   no  cp  or sob    pos occas wheezing        Review of Systems   Constitutional: Negative.    HENT:  Positive for rhinorrhea.    Eyes: Negative.    Respiratory:  Positive for cough.    Cardiovascular: Negative.    Gastrointestinal: Negative.    Endocrine: Negative.    Genitourinary: Negative.    Musculoskeletal: Negative.    Skin: Negative.    Allergic/Immunologic: Negative.    Neurological: Negative.    Hematological: Negative.    Psychiatric/Behavioral: Negative.           Current Outpatient Medications:     cefdinir (OMNICEF) 300 MG capsule, Take 1 capsule by mouth 2 times daily for 10 days, Disp: 20 capsule, Rfl: 0    predniSONE (DELTASONE) 10 MG tablet, ONE TID FOR THREE DAYS, ONE BID FOR THREE DAYS, ONE QD FOR THREE DAYS   FOOD, Disp: 18 tablet, Rfl: 0    promethazine-dextromethorphan (PROMETHAZINE-DM) 6.25-15 MG/5ML syrup, Take 5 mLs by mouth 4 times daily as needed for Cough, Disp: 120 mL, Rfl: 0    metoprolol succinate (TOPROL XL) 25 MG extended release tablet, Take 0.5 tablets by mouth daily, Disp: 30 tablet, Rfl: 3    amLODIPine (NORVASC) 5 MG tablet, Take 1 tablet by mouth daily, Disp: 30 tablet, Rfl: 3    albuterol sulfate HFA (VENTOLIN HFA) 108 (90 Base) MCG/ACT inhaler, Inhale 1-2 puffs into the lungs every 4-6 hours as needed for Wheezing or Shortness of Breath, Disp: , Rfl:     Caltrate 600+D Plus Minerals (CALTRATE) 600-800 MG-UNIT TABS tablet, Take 1 tablet by mouth 2 times daily, Disp: , Rfl:     Bevacizumab (AVASTIN IV), Infuse intravenously every 28 days @  [facility] ([address] MANAGED BY DR. DAISHA POST LAST DOSE: 04/10/2024 NEXT DOSE DUE: 2024, Disp: , Rfl:     apixaban (ELIQUIS) 5 MG TABS tablet, Take 1 tablet by mouth 2 times daily, Disp: 180 tablet, Rfl: 3    acetaminophen (TYLENOL) 500 MG

## 2024-06-21 ENCOUNTER — OFFICE VISIT (OUTPATIENT)
Dept: CARDIOLOGY CLINIC | Age: 74
End: 2024-06-21

## 2024-06-21 VITALS
RESPIRATION RATE: 16 BRPM | SYSTOLIC BLOOD PRESSURE: 128 MMHG | HEART RATE: 96 BPM | HEIGHT: 65 IN | DIASTOLIC BLOOD PRESSURE: 80 MMHG | WEIGHT: 143.4 LBS | BODY MASS INDEX: 23.89 KG/M2

## 2024-06-21 DIAGNOSIS — I48.0 PAROXYSMAL ATRIAL FIBRILLATION (HCC): Primary | ICD-10-CM

## 2024-06-21 RX ORDER — AMLODIPINE BESYLATE 2.5 MG/1
2.5 TABLET ORAL DAILY
Qty: 90 TABLET | Refills: 3 | Status: SHIPPED | OUTPATIENT
Start: 2024-06-21

## 2024-06-21 RX ORDER — METOPROLOL SUCCINATE 25 MG/1
25 TABLET, EXTENDED RELEASE ORAL DAILY
Qty: 90 TABLET | Refills: 3 | Status: SHIPPED | OUTPATIENT
Start: 2024-06-21

## 2024-06-21 NOTE — PROGRESS NOTES
Irving Butt  1950  Date of Service: 6/21/2024    Patient Active Problem List    Diagnosis Date Noted    Diet-controlled diabetes mellitus (HCC) 02/21/2023     Priority: Medium    Bradycardia 05/15/2024    Impaired memory 02/21/2024    Herniation of lumbar intervertebral disc with radiculopathy 08/21/2023    Right leg pain 06/20/2023    Vitamin B12 deficiency (dietary) anemia 02/18/2022    Vitamin D deficiency 02/18/2022    Thrombocytopenia, unspecified 02/18/2022    Kidney stone 01/28/2022    Gallbladder sludge 01/28/2022    Rheumatoid arthritis involving multiple sites with positive rheumatoid factor (HCC) 01/27/2022    Paroxysmal atrial fibrillation (HCC) 05/09/2021    Chronic hepatitis B (HCC) 11/23/2020    Thoracic aortic aneurysm without rupture (HCC) 02/02/2020    Basal cell carcinoma of skin of face 04/03/2018     Overview Note:     cheek      Coronary atherosclerosis 04/03/2018     Overview Note:     Overview:   was told his 1993 cath showed \"some blockage but not enough\" to intervene (done for CP)      MR (mitral regurgitation) 04/03/2018    GERD (gastroesophageal reflux disease) 04/03/2018    Hyperlipemia 04/03/2018    Macular degeneration 04/03/2018    Asthma 04/03/2018     Overview Note:     Overview:   had been on prednisone for 30 years, recently PCP has told him to temporarily stop prednisone for a trial \"to help prevent side effects\" per pt      AR (aortic regurgitation) 04/03/2018    Pulmonary emphysema (HCC) 11/30/2016    Lumbar spinal stenosis 11/22/2016    Essential hypertension 11/22/2016    Kidney cyst, acquired 11/22/2016    Heart valve disease 06/04/2012       Social History     Socioeconomic History    Marital status:      Spouse name: None    Number of children: None    Years of education: None    Highest education level: None   Tobacco Use    Smoking status: Former     Current packs/day: 0.00     Average packs/day: 2.5 packs/day for 9.9 years (24.9 ttl pk-yrs)     Types:

## 2024-06-24 ENCOUNTER — HOSPITAL ENCOUNTER (OUTPATIENT)
Dept: CARDIAC REHAB | Age: 74
Discharge: HOME | End: 2024-06-24

## 2024-06-28 ENCOUNTER — NURSE ONLY (OUTPATIENT)
Dept: CARDIOLOGY CLINIC | Age: 74
End: 2024-06-28
Payer: MEDICARE

## 2024-06-28 ENCOUNTER — TELEPHONE (OUTPATIENT)
Dept: CARDIOLOGY CLINIC | Age: 74
End: 2024-06-28

## 2024-06-28 DIAGNOSIS — I48.0 PAROXYSMAL ATRIAL FIBRILLATION (HCC): Primary | ICD-10-CM

## 2024-06-28 PROCEDURE — 93000 ELECTROCARDIOGRAM COMPLETE: CPT | Performed by: INTERNAL MEDICINE

## 2024-07-01 ENCOUNTER — TELEPHONE (OUTPATIENT)
Dept: CARDIOLOGY CLINIC | Age: 74
End: 2024-07-01

## 2024-07-01 NOTE — TELEPHONE ENCOUNTER
----- Message from Hernandez Begum, DO sent at 6/28/2024  9:21 PM EDT -----  Please let him know that he is back in normal rhythm.

## 2024-07-01 NOTE — TELEPHONE ENCOUNTER
I think that I did this 1 on Friday.  He is maintaining normal rhythm now.  No need for cardioversion.

## 2024-07-10 PROCEDURE — 9900000038 HC CARDIAC REHAB PHASE 3 - MONTHLY

## 2024-07-29 ENCOUNTER — HOSPITAL ENCOUNTER (OUTPATIENT)
Dept: CARDIAC REHAB | Age: 74
Discharge: HOME | End: 2024-07-29
Payer: MEDICARE

## 2024-08-19 DIAGNOSIS — M81.0 AGE-RELATED OSTEOPOROSIS WITHOUT CURRENT PATHOLOGICAL FRACTURE: ICD-10-CM

## 2024-08-19 DIAGNOSIS — Z12.5 PROSTATE CANCER SCREENING: ICD-10-CM

## 2024-08-19 DIAGNOSIS — E78.2 MIXED HYPERLIPIDEMIA: ICD-10-CM

## 2024-08-19 DIAGNOSIS — M05.79 RHEUMATOID ARTHRITIS INVOLVING MULTIPLE SITES WITH POSITIVE RHEUMATOID FACTOR (HCC): ICD-10-CM

## 2024-08-19 DIAGNOSIS — R73.03 PRE-DIABETES: ICD-10-CM

## 2024-08-19 DIAGNOSIS — E03.9 ACQUIRED HYPOTHYROIDISM: ICD-10-CM

## 2024-08-19 DIAGNOSIS — E53.8 VITAMIN B 12 DEFICIENCY: ICD-10-CM

## 2024-08-19 LAB
ALBUMIN: 4.5 G/DL (ref 3.5–5.2)
ALP BLD-CCNC: 58 U/L (ref 40–129)
ALT SERPL-CCNC: 28 U/L (ref 0–40)
ANION GAP SERPL CALCULATED.3IONS-SCNC: 18 MMOL/L (ref 7–16)
AST SERPL-CCNC: 38 U/L (ref 0–39)
BASOPHILS ABSOLUTE: 0.06 K/UL (ref 0–0.2)
BASOPHILS RELATIVE PERCENT: 1 % (ref 0–2)
BILIRUB SERPL-MCNC: 1.9 MG/DL (ref 0–1.2)
BILIRUBIN, URINE: NEGATIVE
BUN BLDV-MCNC: 20 MG/DL (ref 6–23)
CALCIUM SERPL-MCNC: 10 MG/DL (ref 8.6–10.2)
CHLORIDE BLD-SCNC: 105 MMOL/L (ref 98–107)
CHOLESTEROL, TOTAL: 136 MG/DL
CO2: 20 MMOL/L (ref 22–29)
COLOR, UA: YELLOW
COMMENT: NORMAL
CREAT SERPL-MCNC: 1 MG/DL (ref 0.7–1.2)
EOSINOPHILS ABSOLUTE: 0.58 K/UL (ref 0.05–0.5)
EOSINOPHILS RELATIVE PERCENT: 8 % (ref 0–6)
GFR, ESTIMATED: 80 ML/MIN/1.73M2
GLUCOSE BLD-MCNC: 105 MG/DL (ref 74–99)
GLUCOSE URINE: NEGATIVE MG/DL
HBA1C MFR BLD: 5.3 % (ref 4–5.6)
HCT VFR BLD CALC: 42.1 % (ref 37–54)
HDLC SERPL-MCNC: 57 MG/DL
HEMOGLOBIN: 13.7 G/DL (ref 12.5–16.5)
IMMATURE GRANULOCYTES %: 0 % (ref 0–5)
IMMATURE GRANULOCYTES ABSOLUTE: 0.03 K/UL (ref 0–0.58)
KETONES, URINE: NEGATIVE MG/DL
LDL CHOLESTEROL: 67 MG/DL
LEUKOCYTE ESTERASE, URINE: NEGATIVE
LYMPHOCYTES ABSOLUTE: 1.75 K/UL (ref 1.5–4)
LYMPHOCYTES RELATIVE PERCENT: 23 % (ref 20–42)
MCH RBC QN AUTO: 33.2 PG (ref 26–35)
MCHC RBC AUTO-ENTMCNC: 32.5 G/DL (ref 32–34.5)
MCV RBC AUTO: 101.9 FL (ref 80–99.9)
MONOCYTES ABSOLUTE: 0.62 K/UL (ref 0.1–0.95)
MONOCYTES RELATIVE PERCENT: 8 % (ref 2–12)
NEUTROPHILS ABSOLUTE: 4.61 K/UL (ref 1.8–7.3)
NEUTROPHILS RELATIVE PERCENT: 60 % (ref 43–80)
NITRITE, URINE: NEGATIVE
PDW BLD-RTO: 13.2 % (ref 11.5–15)
PH, URINE: 6 (ref 5–9)
PLATELET # BLD: 140 K/UL (ref 130–450)
PMV BLD AUTO: 10.7 FL (ref 7–12)
POTASSIUM SERPL-SCNC: 4.1 MMOL/L (ref 3.5–5)
PROSTATE SPECIFIC ANTIGEN: 1.6 NG/ML (ref 0–4)
PROTEIN UA: NEGATIVE MG/DL
RBC # BLD: 4.13 M/UL (ref 3.8–5.8)
SODIUM BLD-SCNC: 143 MMOL/L (ref 132–146)
SPECIFIC GRAVITY UA: 1.02 (ref 1–1.03)
THYROXINE (T4): 6.8 UG/DL (ref 4.5–11.7)
TOTAL PROTEIN: 6.7 G/DL (ref 6.4–8.3)
TRIGL SERPL-MCNC: 62 MG/DL
TSH SERPL DL<=0.05 MIU/L-ACNC: 0.77 UIU/ML (ref 0.27–4.2)
TURBIDITY: CLEAR
URINE HGB: NEGATIVE
UROBILINOGEN, URINE: 0.2 EU/DL (ref 0–1)
VITAMIN D 25-HYDROXY: 35.1 NG/ML (ref 30–100)
VLDLC SERPL CALC-MCNC: 12 MG/DL
WBC # BLD: 7.7 K/UL (ref 4.5–11.5)

## 2024-08-20 LAB — VITAMIN B-12: 1174 PG/ML (ref 211–946)

## 2024-08-21 ENCOUNTER — OFFICE VISIT (OUTPATIENT)
Dept: PRIMARY CARE CLINIC | Age: 74
End: 2024-08-21
Payer: MEDICARE

## 2024-08-21 VITALS
HEIGHT: 65 IN | TEMPERATURE: 98 F | OXYGEN SATURATION: 98 % | BODY MASS INDEX: 24.32 KG/M2 | HEART RATE: 57 BPM | SYSTOLIC BLOOD PRESSURE: 128 MMHG | WEIGHT: 146 LBS | DIASTOLIC BLOOD PRESSURE: 78 MMHG

## 2024-08-21 DIAGNOSIS — J43.8 OTHER EMPHYSEMA (HCC): Chronic | ICD-10-CM

## 2024-08-21 DIAGNOSIS — D68.69 SECONDARY HYPERCOAGULABLE STATE (HCC): ICD-10-CM

## 2024-08-21 DIAGNOSIS — E78.2 MIXED HYPERLIPIDEMIA: ICD-10-CM

## 2024-08-21 DIAGNOSIS — I48.0 PAROXYSMAL ATRIAL FIBRILLATION (HCC): Chronic | ICD-10-CM

## 2024-08-21 DIAGNOSIS — M81.0 AGE-RELATED OSTEOPOROSIS WITHOUT CURRENT PATHOLOGICAL FRACTURE: ICD-10-CM

## 2024-08-21 DIAGNOSIS — I25.10 ATHEROSCLEROSIS OF NATIVE CORONARY ARTERY OF NATIVE HEART WITHOUT ANGINA PECTORIS: Chronic | ICD-10-CM

## 2024-08-21 DIAGNOSIS — I10 ESSENTIAL HYPERTENSION: Primary | Chronic | ICD-10-CM

## 2024-08-21 DIAGNOSIS — E53.8 VITAMIN B 12 DEFICIENCY: ICD-10-CM

## 2024-08-21 PROCEDURE — 3017F COLORECTAL CA SCREEN DOC REV: CPT | Performed by: FAMILY MEDICINE

## 2024-08-21 PROCEDURE — G8420 CALC BMI NORM PARAMETERS: HCPCS | Performed by: FAMILY MEDICINE

## 2024-08-21 PROCEDURE — G8428 CUR MEDS NOT DOCUMENT: HCPCS | Performed by: FAMILY MEDICINE

## 2024-08-21 PROCEDURE — 1036F TOBACCO NON-USER: CPT | Performed by: FAMILY MEDICINE

## 2024-08-21 PROCEDURE — 1123F ACP DISCUSS/DSCN MKR DOCD: CPT | Performed by: FAMILY MEDICINE

## 2024-08-21 PROCEDURE — 3023F SPIROM DOC REV: CPT | Performed by: FAMILY MEDICINE

## 2024-08-21 PROCEDURE — 3074F SYST BP LT 130 MM HG: CPT | Performed by: FAMILY MEDICINE

## 2024-08-21 PROCEDURE — 3078F DIAST BP <80 MM HG: CPT | Performed by: FAMILY MEDICINE

## 2024-08-21 PROCEDURE — 99214 OFFICE O/P EST MOD 30 MIN: CPT | Performed by: FAMILY MEDICINE

## 2024-08-21 SDOH — ECONOMIC STABILITY: FOOD INSECURITY: WITHIN THE PAST 12 MONTHS, YOU WORRIED THAT YOUR FOOD WOULD RUN OUT BEFORE YOU GOT MONEY TO BUY MORE.: NEVER TRUE

## 2024-08-21 SDOH — ECONOMIC STABILITY: FOOD INSECURITY: WITHIN THE PAST 12 MONTHS, THE FOOD YOU BOUGHT JUST DIDN'T LAST AND YOU DIDN'T HAVE MONEY TO GET MORE.: NEVER TRUE

## 2024-08-21 SDOH — ECONOMIC STABILITY: INCOME INSECURITY: HOW HARD IS IT FOR YOU TO PAY FOR THE VERY BASICS LIKE FOOD, HOUSING, MEDICAL CARE, AND HEATING?: NOT HARD AT ALL

## 2024-08-21 ASSESSMENT — PATIENT HEALTH QUESTIONNAIRE - PHQ9
SUM OF ALL RESPONSES TO PHQ QUESTIONS 1-9: 0
SUM OF ALL RESPONSES TO PHQ9 QUESTIONS 1 & 2: 0
1. LITTLE INTEREST OR PLEASURE IN DOING THINGS: NOT AT ALL
2. FEELING DOWN, DEPRESSED OR HOPELESS: NOT AT ALL

## 2024-08-21 NOTE — PROGRESS NOTES
Skin:     General: Skin is warm.   Neurological:      Mental Status: He is alert and oriented to person, place, and time.   Psychiatric:         Behavior: Behavior normal.         Irving was seen today for follow-up and discuss labs.    Diagnoses and all orders for this visit:    Essential hypertension    Mixed hyperlipidemia    Atherosclerosis of native coronary artery of native heart without angina pectoris    Other emphysema (HCC)    Paroxysmal atrial fibrillation (HCC)    Secondary hypercoagulable state (HCC)        Comments: diet  exer [facility]  fuwit  carui  urol   geritaircs    I educated the patient about all medications.  Make sure they were correct and educated  on the risk associated with missing meds or taking them incorrectly.  A list of medications is being sent home with patient today.    Check blood pressure at home twice a day.  Low-salt low caffeine diet.  Call if systolic blood pressure is above 150 and diastolic blood pressures above 85.  Only use a upper arm digital cuff.    Aggressive low-fat diet.  Avoid red meats, greasy fried foods, dairy products.  Avoid processed foods.  Take cholesterol medications without food.  .A great deal of time spent reviewing medications, diet, exercise, social issues. Also reviewing the chart before entering the room with patient and finishing charting after leaving patient's room. More than half of that time was spent face to face with the patient in counseling and coordinating care.  I informed patient about the risk associated with noncompliance of medication and taking medications incorrectly.  Appropriate follow-up with myself and all specialist.  Encourage family members to take active role in assisting with medications and medical care.  If any confusion should develop to notify my office immediately to avoid risk of worsening medical condition      Follow Up: No follow-ups on file.     Seen by:  Gómez Aguilar DO

## 2024-08-26 ENCOUNTER — HOSPITAL ENCOUNTER (OUTPATIENT)
Dept: CARDIAC REHAB | Age: 74
Discharge: HOME | End: 2024-08-26

## 2024-08-26 PROCEDURE — 9900000038 HC CARDIAC REHAB PHASE 3 - MONTHLY

## 2024-09-27 PROCEDURE — 9900000038 HC CARDIAC REHAB PHASE 3 - MONTHLY

## 2024-09-30 ENCOUNTER — HOSPITAL ENCOUNTER (OUTPATIENT)
Dept: CARDIAC REHAB | Age: 74
Discharge: HOME | End: 2024-09-30
Payer: MEDICARE

## 2024-10-08 ENCOUNTER — OFFICE VISIT (OUTPATIENT)
Dept: GERIATRIC MEDICINE | Age: 74
End: 2024-10-08
Payer: MEDICARE

## 2024-10-08 VITALS
DIASTOLIC BLOOD PRESSURE: 66 MMHG | WEIGHT: 156.6 LBS | BODY MASS INDEX: 26.06 KG/M2 | TEMPERATURE: 98.4 F | HEART RATE: 58 BPM | RESPIRATION RATE: 16 BRPM | SYSTOLIC BLOOD PRESSURE: 118 MMHG

## 2024-10-08 DIAGNOSIS — G31.84 MCI (MILD COGNITIVE IMPAIRMENT): Primary | ICD-10-CM

## 2024-10-08 PROCEDURE — G8484 FLU IMMUNIZE NO ADMIN: HCPCS | Performed by: INTERNAL MEDICINE

## 2024-10-08 PROCEDURE — 99213 OFFICE O/P EST LOW 20 MIN: CPT | Performed by: INTERNAL MEDICINE

## 2024-10-08 PROCEDURE — 3078F DIAST BP <80 MM HG: CPT | Performed by: INTERNAL MEDICINE

## 2024-10-08 PROCEDURE — 1123F ACP DISCUSS/DSCN MKR DOCD: CPT | Performed by: INTERNAL MEDICINE

## 2024-10-08 PROCEDURE — 3017F COLORECTAL CA SCREEN DOC REV: CPT | Performed by: INTERNAL MEDICINE

## 2024-10-08 PROCEDURE — 3074F SYST BP LT 130 MM HG: CPT | Performed by: INTERNAL MEDICINE

## 2024-10-08 PROCEDURE — G8427 DOCREV CUR MEDS BY ELIG CLIN: HCPCS | Performed by: INTERNAL MEDICINE

## 2024-10-08 PROCEDURE — 1036F TOBACCO NON-USER: CPT | Performed by: INTERNAL MEDICINE

## 2024-10-08 PROCEDURE — G8419 CALC BMI OUT NRM PARAM NOF/U: HCPCS | Performed by: INTERNAL MEDICINE

## 2024-10-08 NOTE — PROGRESS NOTES
Chief Complaint   Patient presents with    6 Month Follow-Up     2nd visit.  Last seen in April.  Here alone.     Immunizations     Pt states he had COVID in early September & was advised by his PCP to wait until the end of October to get his flu shot.

## 2024-10-08 NOTE — PROGRESS NOTES
CC Check MCI    Here for follow up   COVID in September CABG 2010  Wife reports he forgets at times, etc  Still a good   Sleeps well  Impression: Mild MCI  Plan: Continue to observe

## 2024-10-28 ENCOUNTER — HOSPITAL ENCOUNTER (OUTPATIENT)
Dept: CARDIAC REHAB | Age: 74
Discharge: HOME | End: 2024-10-28
Payer: MEDICARE

## 2024-10-28 PROCEDURE — 9900000038 HC CARDIAC REHAB PHASE 3 - MONTHLY

## 2024-11-04 ENCOUNTER — HOSPITAL ENCOUNTER (EMERGENCY)
Age: 74
Discharge: HOME | End: 2024-11-04
Attending: EMERGENCY MEDICINE
Payer: MEDICARE

## 2024-11-04 ENCOUNTER — APPOINTMENT (OUTPATIENT)
Dept: CT IMAGING | Age: 74
End: 2024-11-04
Payer: MEDICARE

## 2024-11-04 VITALS
SYSTOLIC BLOOD PRESSURE: 141 MMHG | TEMPERATURE: 98 F | WEIGHT: 146 LBS | BODY MASS INDEX: 24.3 KG/M2 | OXYGEN SATURATION: 96 % | HEART RATE: 72 BPM | DIASTOLIC BLOOD PRESSURE: 80 MMHG

## 2024-11-04 DIAGNOSIS — S01.91XA LACERATION OF HEAD WITHOUT FOREIGN BODY, UNSPECIFIED PART OF HEAD, INITIAL ENCOUNTER: Primary | ICD-10-CM

## 2024-11-04 PROCEDURE — 70450 CT HEAD/BRAIN W/O DYE: CPT

## 2024-11-04 PROCEDURE — 12011 RPR F/E/E/N/L/M 2.5 CM/<: CPT

## 2024-11-04 PROCEDURE — 99284 EMERGENCY DEPT VISIT MOD MDM: CPT

## 2024-11-04 ASSESSMENT — PAIN DESCRIPTION - ORIENTATION
ORIENTATION: RIGHT
ORIENTATION: RIGHT

## 2024-11-04 ASSESSMENT — PAIN DESCRIPTION - LOCATION
LOCATION: FACE;HEAD
LOCATION: HAND

## 2024-11-04 ASSESSMENT — PAIN - FUNCTIONAL ASSESSMENT: PAIN_FUNCTIONAL_ASSESSMENT: NONE - DENIES PAIN

## 2024-11-04 ASSESSMENT — LIFESTYLE VARIABLES
HOW OFTEN DO YOU HAVE A DRINK CONTAINING ALCOHOL: NEVER
HOW MANY STANDARD DRINKS CONTAINING ALCOHOL DO YOU HAVE ON A TYPICAL DAY: PATIENT DOES NOT DRINK

## 2024-11-04 ASSESSMENT — PAIN SCALES - GENERAL
PAINLEVEL_OUTOF10: 6
PAINLEVEL_OUTOF10: 5

## 2024-11-04 ASSESSMENT — PAIN DESCRIPTION - DESCRIPTORS
DESCRIPTORS: THROBBING
DESCRIPTORS: ACHING;BURNING

## 2024-11-04 NOTE — ED PROVIDER NOTES
[facility] EMERGENCY DEPARTMENT  EMERGENCY DEPARTMENT ENCOUNTER        Pt Name: Irving Butt  MRN: [id number]  Birthdate 1950  Date of evaluation: 11/4/2024  Provider: Kika Price DO  PCP: Gómez Aguilar DO  Note Started: 6:11 PM EST 11/4/24    CHIEF COMPLAINT       Chief Complaint   Patient presents with    Laceration     Lac to R forehead; hit head with the handle of a drill, bleeding controlled pt on thinners        HISTORY OF PRESENT ILLNESS: 1 or more Elements   History From: Patient     Limitations to history : None    Irving Butt is a 74 y.o. male with past medical history of hypertension, paroxysmal atrial fibrillation on Eliquis, hyperlipidemia who presents for a laceration.  The patient states that he was drilling something around 4:30 pm and hit his head with the handle of the drill accidentally.  He did sustain a small 2 cm laceration to the right side of his forehead.  He is on Eliquis for atrial fibrillation.  He states he has had a headache ever since, but denies any vision changes.  His headache is located on the right side of his head and is dull and aching in nature. He denies any chest pain, shortness of breath, abdominal pain.    Patient denies fever, chills, shortness of breath, chest pain, abdominal pain, nausea, vomiting, diarrhea, lightheadedness, dysuria, hematuria, hematochezia, and melena.    Nursing Notes were all reviewed and agreed with or any disagreements were addressed in the HPI.        REVIEW OF EXTERNAL NOTES :         Reviewed progress note from October 30 from the [facility]       REVIEW OF SYSTEMS :           Positives and Pertinent negatives as per HPI.     SURGICAL HISTORY     Past Surgical History:   Procedure Laterality Date    ADENOIDECTOMY      CARDIAC CATHETERIZATION      CARDIAC VALVE SURGERY  06/16/2020    AVR and repair of ascending aortic aneurysm in [place]; dr. Kohli ([facility]) yearly    CARDIOVASCULAR

## 2024-11-04 NOTE — ED TRIAGE NOTES
FIRST PROVIDER CONTACT ASSESSMENT NOTE       Department of Emergency Medicine                 First Provider Note            24  3:43 PM EST    Date of Encounter: No admission date for patient encounter.    Patient Name: Irving Butt  : 1950  MRN: [id number]    Chief Complaint: Laceration (Lac to R forehead; hit head with the handle of a drill )      History of Present Illness:   Irving Butt is a 74 y.o. male who presents to the ED for Patient was using a hammer drill when it bounced backward and hit him in the head.  He denies LOC, but he is on a blood thinner.  Patient takes Eliquis.       Focused Physical Exam:  VS:    ED Triage Vitals   BP Systolic BP Percentile Diastolic BP Percentile Temp Temp src Pulse Resp SpO2   -- -- -- -- -- -- -- --      Height Weight         -- --              Physical Ex: Constitutional: Alert and non-toxic.    Medical History:  has a past medical history of Acute cystitis with hematuria, Acute respiratory failure, Aneurysm artery, pulmonary (HCC), Arthritis, Asthma, Cancer (HCC), Community acquired pneumonia of left lower lobe of lung, Deviated septum, Gallbladder disease, Hepatitis B, Hypercholesterolemia, Hypertension, Kidney stone, Restless leg syndrome, and Rheumatoid arthritis(714.0).  Surgical History:  has a past surgical history that includes hernia repair (Left); cardiovascular stress test (); Tonsillectomy; hernia repair; Cataract removal (Bilateral, ); Colonoscopy; Diagnostic Cardiac Cath Lab Procedure; Vasectomy; Adenoidectomy; cyst removal; skin biopsy (2018); Cardiac valuve replacement (2020); Cardiac catheterization; Nasal septum surgery; Cystoscopy (N/A, 2023); and Cholecystectomy, laparoscopic (N/A, 7/10/2023).  Social History:  reports that he quit smoking about 48 years ago. His smoking use included cigarettes. He started smoking about 58 years ago. He has a 24.9 pack-year smoking history. He has never used smokeless tobacco.

## 2024-11-05 NOTE — DISCHARGE INSTRUCTIONS
You were seen in the emergency department today for a laceration to your forehead.  Your head CT showed no evidence for a brain bleed. This laceration was glued and covered with Steri-Strips.  You can use Tylenol at home for headache pain.  You should return to the emergency department if you develop a more severe headache, visual changes, nausea, vomiting, or purulent drainage from your laceration.  You can follow-up with your primary care physician in a week.

## 2024-11-06 ENCOUNTER — CARE COORDINATION (OUTPATIENT)
Dept: CARE COORDINATION | Age: 74
End: 2024-11-06

## 2024-11-06 NOTE — CARE COORDINATION
Ambulatory Care Coordination Note     11/6/2024 11:36 AM     ACM outreach attempt by this ACM today to offer care management services. ACM was unable to reach the patient by telephone today; left voice message requesting a return phone call to this ACM.     ACM: Francine Lawton RN     Care Summary Note:   NA    PCP/Specialist follow up:   Future Appointments           Provider Specialty Dept Phone     11/25/2024 7:30 AM [facility] CARDIAC REHAB EX [facility] Cardiac Rehabilitation [phone]     11/27/2024 9:10 AM Hernandez Begum, DO Cardiology [phone]     12/30/2024 7:30 AM [facility] CARDIAC REHAB EX [facility] Cardiac Rehabilitation [phone]     2/18/2025 7:15 AM Gómez Aguilar, DO Primary Care [phone]     4/16/2025 1:00 PM Pool Sebastian MD Geriatric Medicine [phone]            Follow Up:   Plan for next AC outreach in approximately 1-2 days  to complete:  - outreach attempt to offer care management services.

## 2024-11-07 ENCOUNTER — CARE COORDINATION (OUTPATIENT)
Dept: CARE COORDINATION | Age: 74
End: 2024-11-07

## 2024-11-07 NOTE — CARE COORDINATION
-ACM spoke with patient to offer enrollment into Care Coordination program & RPM services.  -Introduced self, reason for call, and explanation of program.   -Patient declined.  However, someone arrived at his home that he needed to talk with. Pt asked if he can call ACM back. ACM agreed.   -Plan:   If no return call, ACM will attempt outreach one more time.

## 2024-11-08 ENCOUNTER — CARE COORDINATION (OUTPATIENT)
Dept: CARE COORDINATION | Age: 74
End: 2024-11-08

## 2024-11-08 NOTE — CARE COORDINATION
Ambulatory Care Coordination Note     11/8/2024 12:08 PM     ACM outreach attempt by this ACM today to offer care management services. ACM was unable to reach the patient by telephone today; left voice message requesting a return phone call to this ACM.     ACM: Francine Lawton RN     Care Summary Note:   NA    PCP/Specialist follow up:   Future Appointments           Provider Specialty Dept Phone     11/13/2024 9:45 AM Gómez Aguilar DO Primary Care [phone]     11/25/2024 7:30 AM [facility] CARDIAC REHAB EX [facility] Cardiac Rehabilitation [phone]     11/27/2024 9:10 AM Hernandez Begum DO Cardiology [phone]     12/30/2024 7:30 AM [facility] CARDIAC REHAB EX [facility] Cardiac Rehabilitation [phone]     2/18/2025 7:15 AM Gómez Aguilar DO Primary Care [phone]     4/16/2025 1:00 PM Pool Sebastian MD Geriatric Medicine [phone]            Follow Up:   Plan for next ACM outreach in approximately 2 weeks to complete:  - outreach attempt to offer care management services.

## 2024-11-13 ENCOUNTER — OFFICE VISIT (OUTPATIENT)
Dept: PRIMARY CARE CLINIC | Age: 74
End: 2024-11-13

## 2024-11-13 VITALS
SYSTOLIC BLOOD PRESSURE: 127 MMHG | BODY MASS INDEX: 24.16 KG/M2 | HEIGHT: 65 IN | WEIGHT: 145 LBS | HEART RATE: 78 BPM | OXYGEN SATURATION: 95 % | DIASTOLIC BLOOD PRESSURE: 78 MMHG | TEMPERATURE: 98.4 F

## 2024-11-13 DIAGNOSIS — S01.81XD LACERATION OF FOREHEAD, SUBSEQUENT ENCOUNTER: ICD-10-CM

## 2024-11-13 DIAGNOSIS — Z00.00 MEDICARE ANNUAL WELLNESS VISIT, SUBSEQUENT: Primary | ICD-10-CM

## 2024-11-13 DIAGNOSIS — I10 ESSENTIAL HYPERTENSION: Chronic | ICD-10-CM

## 2024-11-13 DIAGNOSIS — Z23 NEED FOR INFLUENZA VACCINATION: ICD-10-CM

## 2024-11-13 DIAGNOSIS — J01.80 ACUTE NON-RECURRENT SINUSITIS OF OTHER SINUS: ICD-10-CM

## 2024-11-13 RX ORDER — CEPHALEXIN 500 MG/1
500 CAPSULE ORAL 3 TIMES DAILY
Qty: 21 CAPSULE | Refills: 0 | Status: SHIPPED | OUTPATIENT
Start: 2024-11-13

## 2024-11-13 ASSESSMENT — PATIENT HEALTH QUESTIONNAIRE - PHQ9
1. LITTLE INTEREST OR PLEASURE IN DOING THINGS: NOT AT ALL
SUM OF ALL RESPONSES TO PHQ9 QUESTIONS 1 & 2: 0
SUM OF ALL RESPONSES TO PHQ QUESTIONS 1-9: 0
SUM OF ALL RESPONSES TO PHQ QUESTIONS 1-9: 0
2. FEELING DOWN, DEPRESSED OR HOPELESS: NOT AT ALL
SUM OF ALL RESPONSES TO PHQ QUESTIONS 1-9: 0
SUM OF ALL RESPONSES TO PHQ QUESTIONS 1-9: 0

## 2024-11-13 NOTE — PATIENT INSTRUCTIONS
screen for glaucoma; cataracts, macular degeneration, and other eye disorders.  A preventive dental visit is recommended every 6 months.  Try to get at least 150 minutes of exercise per week or 10,000 steps per day on a pedometer .  Order or download the FREE \"Exercise & Physical Activity: Your Everyday Guide\" from [employer] [place] [employer]. Call 1-105.264.1729 or search [employer] [place] [employer] online.  You need 4193-2079 mg of calcium and 5087-8490 IU of vitamin D per day. It is possible to meet your calcium requirement with diet alone, but a vitamin D supplement is usually necessary to meet this goal.  When exposed to the sun, use a sunscreen that protects against both UVA and UVB radiation with an SPF of 30 or greater. Reapply every 2 to 3 hours or after sweating, drying off with a towel, or swimming.  Always wear a seat belt when traveling in a car. Always wear a helmet when riding a bicycle or motorcycle.

## 2024-11-13 NOTE — PROGRESS NOTES
Medicare Annual Wellness Visit    Irving Butt is here for Medicare AWV and Follow-up (ER)    Assessment & Plan   Medicare annual wellness visit, subsequent  Need for influenza vaccination  Laceration of forehead, subsequent encounter  Essential hypertension  Acute non-recurrent sinusitis of other sinus  -     cephALEXin (KEFLEX) 500 MG capsule; Take 1 capsule by mouth 3 times daily, Disp-21 capsule, R-0Normal    Recommendations for Preventive Services Due: see orders and patient instructions/AVS.  Recommended screening schedule for the next 5-10 years is provided to the patient in written form: see Patient Instructions/AVS.     Return for Reg Appt.     Subjective   The following acute and/or chronic problems were also addressed today:  ER FU      AND  AWV  LAC  RIGHT FOREHEAD     9  DO AGO  WITH   GUN        Patient's complete Health Risk Assessment and screening values have been reviewed and are found in Flowsheets. The following problems were reviewed today and where indicated follow up appointments were made and/or referrals ordered.    Positive Risk Factor Screenings with Interventions:              Inactivity:  On average, how many days per week do you engage in moderate to strenuous exercise (like a brisk walk)?: 0 days (!) Abnormal  On average, how many minutes do you engage in exercise at this level?: 0 min  Interventions:  Patient declined any further interventions or treatment             LDCT Screening: Discussed with patient the benefits and harms of screening, follow-up diagnostic testing, over-diagnosis, false positive rate, and total radiation exposure. Counseled on the importance of adherence to annual lung cancer LDCT screening, impact of comorbidities, ability and willingness to undergo diagnosis and treatment. Counseled on the importance of maintaining cigarette smoking abstinence and cessation. The patient has a history of >20 pack years and is either still smoking or quit within the last 15

## 2024-11-17 ENCOUNTER — HOSPITAL ENCOUNTER (EMERGENCY)
Age: 74
Discharge: HOME | End: 2024-11-17
Attending: EMERGENCY MEDICINE
Payer: MEDICARE

## 2024-11-17 VITALS
TEMPERATURE: 97.9 F | RESPIRATION RATE: 15 BRPM | SYSTOLIC BLOOD PRESSURE: 150 MMHG | HEART RATE: 60 BPM | DIASTOLIC BLOOD PRESSURE: 86 MMHG | OXYGEN SATURATION: 97 %

## 2024-11-17 DIAGNOSIS — S01.01XA LACERATION OF SCALP, INITIAL ENCOUNTER: Primary | ICD-10-CM

## 2024-11-17 DIAGNOSIS — Z79.01 ON APIXABAN THERAPY: ICD-10-CM

## 2024-11-17 LAB
HCT VFR BLD AUTO: 38.6 % (ref 37–54)
HGB BLD-MCNC: 12.5 G/DL (ref 12.5–16.5)

## 2024-11-17 PROCEDURE — 85018 HEMOGLOBIN: CPT

## 2024-11-17 PROCEDURE — 90471 IMMUNIZATION ADMIN: CPT

## 2024-11-17 PROCEDURE — 12001 RPR S/N/AX/GEN/TRNK 2.5CM/<: CPT

## 2024-11-17 PROCEDURE — 99284 EMERGENCY DEPT VISIT MOD MDM: CPT

## 2024-11-17 PROCEDURE — 85014 HEMATOCRIT: CPT

## 2024-11-17 PROCEDURE — 90714 TD VACC NO PRESV 7 YRS+ IM: CPT

## 2024-11-17 PROCEDURE — 2500000003 HC RX 250 WO HCPCS: Performed by: PHYSICIAN ASSISTANT

## 2024-11-17 PROCEDURE — 6370000000 HC RX 637 (ALT 250 FOR IP)

## 2024-11-17 PROCEDURE — 6360000002 HC RX W HCPCS

## 2024-11-17 RX ORDER — ACETAMINOPHEN 500 MG
TABLET ORAL
Status: COMPLETED
Start: 2024-11-17 | End: 2024-11-17

## 2024-11-17 RX ORDER — ACETAMINOPHEN 500 MG
1000 TABLET ORAL ONCE
Status: COMPLETED | OUTPATIENT
Start: 2024-11-17 | End: 2024-11-17

## 2024-11-17 RX ORDER — LIDOCAINE HYDROCHLORIDE AND EPINEPHRINE 10; 10 MG/ML; UG/ML
20 INJECTION, SOLUTION INFILTRATION; PERINEURAL ONCE
Status: COMPLETED | OUTPATIENT
Start: 2024-11-17 | End: 2024-11-17

## 2024-11-17 RX ADMIN — LIDOCAINE HYDROCHLORIDE,EPINEPHRINE BITARTRATE 20 ML: 10; .01 INJECTION, SOLUTION INFILTRATION; PERINEURAL at 15:25

## 2024-11-17 RX ADMIN — ACETAMINOPHEN 1000 MG: 500 TABLET ORAL at 16:29

## 2024-11-17 RX ADMIN — CLOSTRIDIUM TETANI TOXOID ANTIGEN (FORMALDEHYDE INACTIVATED) AND CORYNEBACTERIUM DIPHTHERIAE TOXOID ANTIGEN (FORMALDEHYDE INACTIVATED) 0.5 ML: 5; 2 INJECTION, SUSPENSION INTRAMUSCULAR at 15:25

## 2024-11-17 RX ADMIN — Medication 1000 MG: at 16:29

## 2024-11-17 ASSESSMENT — PAIN - FUNCTIONAL ASSESSMENT
PAIN_FUNCTIONAL_ASSESSMENT: NONE - DENIES PAIN
PAIN_FUNCTIONAL_ASSESSMENT: NONE - DENIES PAIN

## 2024-11-17 ASSESSMENT — PAIN SCALES - GENERAL: PAINLEVEL_OUTOF10: 10

## 2024-11-17 NOTE — DISCHARGE INSTRUCTIONS
Please return to ED if symptoms worsen, persist, or occur.  Please follow-up with PCP.  Please take your medications as prescribed.    Please remove your sutures in 10 to 14 days.  Please return to the ER if you develop rebleeding.  Also please return to the ER if you develop redness around the area, purulent drainage such as green or yellow color drainage.  Please present to the ER immediately if develop fevers.

## 2024-11-17 NOTE — ED PROVIDER NOTES
Department of Emergency Medicine     Written by: Savanna Londono MD  Patient Name: Irving Butt  Admit Date: 2024  2:44 PM  MRN: [id number]                   : 1950    HPI     Chief Complaint   Patient presents with    Wound Check     Seen last week for drill injury.  Hematoma formed.  Started bleeding today.  Takes Eliquis       Irving Butt is a 74 y.o. male that presents to the ED due to concerns for bleeding scalp wound.  Had laceration due to drill injury 13 days ago.  Had laceration fixed with Dermabond and Steri-Strips.  Presented to the ER today as he was walking in [employer] and he had a sudden burst of a hematoma that has been going over the past few days.  On arrival to the ER he is pulsating blood from the wound.  He is not sure when his last tetanus vaccination was.  He said he had some lightheadedness with the bleeding.  He is on Eliquis    Review of systems   Pertinent positives and negatives mentioned in the HPI/MDM.      Physical   Physical Exam  Constitutional:       General: He is not in acute distress.     Appearance: Normal appearance.   HENT:      Head:      Comments: Scalp laceration and hematoma anterior frontal region.  Pulsating bleed.  Eyes:      Extraocular Movements: Extraocular movements intact.      Pupils: Pupils are equal, round, and reactive to light.   Cardiovascular:      Rate and Rhythm: Normal rate and regular rhythm.   Pulmonary:      Effort: Pulmonary effort is normal. No respiratory distress.   Abdominal:      Palpations: Abdomen is soft.      Tenderness: There is no abdominal tenderness.   Skin:     General: Skin is warm and dry.   Neurological:      Mental Status: He is alert and oriented to person, place, and time. Mental status is at baseline.          Chart review: Was seen by gastroenterology at [facility] for chronic hepatitis B on 2024    Social determinants: family at bedside, social support available    Acute or chronic illness limiting

## 2024-11-22 ENCOUNTER — CARE COORDINATION (OUTPATIENT)
Dept: CARE COORDINATION | Age: 74
End: 2024-11-22

## 2024-11-22 NOTE — CARE COORDINATION
Ambulatory Care Coordination Note     11/22/2024 1:01 PM     patient outreach attempt by this ACM today to offer care management services. ACM was unable to reach the patient by telephone today;   left voice message requesting a return phone call to this ACM. If no return call today, will resolve program.      Patient closed (unable to reach patient) from the High Risk Care Management program on 11/22/2024.  Patient  unable to reach patient .  No further Ambulatory Care Manager follow up scheduled.

## 2024-11-25 ENCOUNTER — HOSPITAL ENCOUNTER (OUTPATIENT)
Dept: CARDIAC REHAB | Age: 74
Discharge: HOME | End: 2024-11-25
Payer: MEDICARE

## 2024-11-25 PROCEDURE — 9900000038 HC CARDIAC REHAB PHASE 3 - MONTHLY

## 2024-11-27 ENCOUNTER — OFFICE VISIT (OUTPATIENT)
Dept: CARDIOLOGY CLINIC | Age: 74
End: 2024-11-27

## 2024-11-27 VITALS
BODY MASS INDEX: 24.32 KG/M2 | SYSTOLIC BLOOD PRESSURE: 140 MMHG | WEIGHT: 146 LBS | DIASTOLIC BLOOD PRESSURE: 65 MMHG | TEMPERATURE: 97.7 F | HEART RATE: 58 BPM | OXYGEN SATURATION: 96 % | HEIGHT: 65 IN | RESPIRATION RATE: 16 BRPM

## 2024-11-27 DIAGNOSIS — I48.0 PAROXYSMAL ATRIAL FIBRILLATION (HCC): Primary | ICD-10-CM

## 2024-11-27 NOTE — PROGRESS NOTES
Irving Butt  1950  Date of Service: 11/27/2024    Patient Active Problem List    Diagnosis Date Noted    Diet-controlled diabetes mellitus (HCC) 02/21/2023     Priority: Medium    Secondary hypercoagulable state (HCC) 08/21/2024    Bradycardia 05/15/2024    Impaired memory 02/21/2024    Herniation of lumbar intervertebral disc with radiculopathy 08/21/2023    Right leg pain 06/20/2023    Vitamin B12 deficiency (dietary) anemia 02/18/2022    Vitamin D deficiency 02/18/2022    Thrombocytopenia, unspecified 02/18/2022    Kidney stone 01/28/2022    Gallbladder sludge 01/28/2022    Rheumatoid arthritis involving multiple sites with positive rheumatoid factor (HCC) 01/27/2022    Paroxysmal atrial fibrillation (HCC) 05/09/2021    Chronic hepatitis B (HCC) 11/23/2020    Thoracic aortic aneurysm without rupture (HCC) 02/02/2020    Basal cell carcinoma of skin of face 04/03/2018     Overview Note:     cheek      Coronary atherosclerosis 04/03/2018     Overview Note:     Overview:   was told his 1993 cath showed \"some blockage but not enough\" to intervene (done for CP)      MR (mitral regurgitation) 04/03/2018    GERD (gastroesophageal reflux disease) 04/03/2018    Mixed hyperlipidemia 04/03/2018    Macular degeneration 04/03/2018    Asthma 04/03/2018     Overview Note:     Overview:   had been on prednisone for 30 years, recently PCP has told him to temporarily stop prednisone for a trial \"to help prevent side effects\" per pt      AR (aortic regurgitation) 04/03/2018    Pulmonary emphysema (HCC) 11/30/2016    Lumbar spinal stenosis 11/22/2016    Essential hypertension 11/22/2016    Kidney cyst, acquired 11/22/2016    Heart valve disease 06/04/2012       Social History     Socioeconomic History    Marital status:      Spouse name: None    Number of children: None    Years of education: None    Highest education level: None   Tobacco Use    Smoking status: Former     Current packs/day: 0.00     Average

## 2024-11-29 ENCOUNTER — OFFICE VISIT (OUTPATIENT)
Dept: PRIMARY CARE CLINIC | Age: 74
End: 2024-11-29

## 2024-11-29 VITALS
RESPIRATION RATE: 16 BRPM | TEMPERATURE: 98.2 F | SYSTOLIC BLOOD PRESSURE: 138 MMHG | OXYGEN SATURATION: 95 % | DIASTOLIC BLOOD PRESSURE: 83 MMHG | HEART RATE: 70 BPM | WEIGHT: 149 LBS | BODY MASS INDEX: 24.79 KG/M2

## 2024-11-29 DIAGNOSIS — I10 ESSENTIAL HYPERTENSION: Chronic | ICD-10-CM

## 2024-11-29 DIAGNOSIS — S01.81XD LACERATION OF FOREHEAD, SUBSEQUENT ENCOUNTER: Primary | ICD-10-CM

## 2024-11-29 SDOH — ECONOMIC STABILITY: FOOD INSECURITY: WITHIN THE PAST 12 MONTHS, THE FOOD YOU BOUGHT JUST DIDN'T LAST AND YOU DIDN'T HAVE MONEY TO GET MORE.: NEVER TRUE

## 2024-11-29 SDOH — ECONOMIC STABILITY: FOOD INSECURITY: WITHIN THE PAST 12 MONTHS, YOU WORRIED THAT YOUR FOOD WOULD RUN OUT BEFORE YOU GOT MONEY TO BUY MORE.: NEVER TRUE

## 2024-11-29 SDOH — ECONOMIC STABILITY: INCOME INSECURITY: HOW HARD IS IT FOR YOU TO PAY FOR THE VERY BASICS LIKE FOOD, HOUSING, MEDICAL CARE, AND HEATING?: NOT HARD AT ALL

## 2024-11-29 ASSESSMENT — ENCOUNTER SYMPTOMS
RESPIRATORY NEGATIVE: 1
EYES NEGATIVE: 1
ROS SKIN COMMENTS: HPI
GASTROINTESTINAL NEGATIVE: 1
ALLERGIC/IMMUNOLOGIC NEGATIVE: 1

## 2024-11-29 NOTE — PROGRESS NOTES
24  Name: Irving Butt    : 1950    Sex: male    Age: 74 y.o.        Subjective:  Chief Complaint: Patient is here for  right forehead ck     Cut with drill bit    then started bleeidng  went to er     two sturue  in there  ten days ago        Review of Systems   Constitutional: Negative.    HENT: Negative.     Eyes: Negative.    Respiratory: Negative.     Cardiovascular: Negative.    Gastrointestinal: Negative.    Endocrine: Negative.    Genitourinary: Negative.    Musculoskeletal: Negative.    Skin:         hpi   Allergic/Immunologic: Negative.    Neurological: Negative.    Hematological: Negative.    Psychiatric/Behavioral: Negative.           Current Outpatient Medications:     amLODIPine (NORVASC) 2.5 MG tablet, Take 1 tablet by mouth daily, Disp: 90 tablet, Rfl: 3    metoprolol succinate (TOPROL XL) 25 MG extended release tablet, Take 1 tablet by mouth daily, Disp: 90 tablet, Rfl: 3    Caltrate 600+D Plus Minerals (CALTRATE) 600-800 MG-UNIT TABS tablet, Take 1 tablet by mouth 2 times daily, Disp: , Rfl:     apixaban (ELIQUIS) 5 MG TABS tablet, Take 1 tablet by mouth 2 times daily, Disp: 180 tablet, Rfl: 3    acetaminophen (TYLENOL) 500 MG tablet, Take 2 tablets by mouth every 6 hours as needed for Pain, Disp: , Rfl:     ascorbic acid (VITAMIN C) 1000 MG tablet, Take 1 tablet by mouth every morning, Disp: , Rfl:     Cholecalciferol (VITAMIN D3) 50 MCG (2000 UT) CAPS, Take 1 capsule by mouth every morning, Disp: , Rfl:     vitamin B-12 (CYANOCOBALAMIN) 1000 MCG tablet, Take 1 tablet by mouth every morning, Disp: , Rfl:     albuterol (PROVENTIL) (2.5 MG/3ML) 0.083% nebulizer solution, Take 3 mLs by nebulization every 6 hours as needed for Wheezing, Disp: 360 each, Rfl: 3    mometasone-formoterol (DULERA) 200-5 MCG/ACT inhaler, Inhale 2 puffs into the lungs in the morning and 2 puffs in the evening., Disp: , Rfl:     fluticasone (FLONASE) 50 MCG/ACT nasal spray, 1 spray by Each Nostril route daily as

## 2024-12-06 ENCOUNTER — OFFICE VISIT (OUTPATIENT)
Dept: PRIMARY CARE CLINIC | Age: 74
End: 2024-12-06

## 2024-12-06 VITALS
RESPIRATION RATE: 16 BRPM | BODY MASS INDEX: 24.46 KG/M2 | WEIGHT: 147 LBS | HEART RATE: 88 BPM | TEMPERATURE: 97.8 F | OXYGEN SATURATION: 93 %

## 2024-12-06 DIAGNOSIS — Z48.02 ENCOUNTER FOR REMOVAL OF SUTURES: Primary | ICD-10-CM

## 2024-12-06 DIAGNOSIS — I10 ESSENTIAL HYPERTENSION: Chronic | ICD-10-CM

## 2024-12-06 ASSESSMENT — ENCOUNTER SYMPTOMS
GASTROINTESTINAL NEGATIVE: 1
EYES NEGATIVE: 1
RESPIRATORY NEGATIVE: 1
ALLERGIC/IMMUNOLOGIC NEGATIVE: 1
ROS SKIN COMMENTS: HPI

## 2024-12-06 NOTE — PROGRESS NOTES
24  Name: Irving Butt    : 1950    Sex: male    Age: 74 y.o.        Subjective:  Chief Complaint: Patient is here for right forehead laceration     Re ck laceration    fele ok no s x no pain        Review of Systems   Constitutional: Negative.    HENT: Negative.     Eyes: Negative.    Respiratory: Negative.     Cardiovascular: Negative.    Gastrointestinal: Negative.    Endocrine: Negative.    Genitourinary: Negative.    Musculoskeletal: Negative.    Skin:         hpi   Allergic/Immunologic: Negative.    Neurological: Negative.    Hematological: Negative.    Psychiatric/Behavioral: Negative.           Current Outpatient Medications:     amLODIPine (NORVASC) 2.5 MG tablet, Take 1 tablet by mouth daily, Disp: 90 tablet, Rfl: 3    metoprolol succinate (TOPROL XL) 25 MG extended release tablet, Take 1 tablet by mouth daily, Disp: 90 tablet, Rfl: 3    Caltrate 600+D Plus Minerals (CALTRATE) 600-800 MG-UNIT TABS tablet, Take 1 tablet by mouth 2 times daily, Disp: , Rfl:     apixaban (ELIQUIS) 5 MG TABS tablet, Take 1 tablet by mouth 2 times daily, Disp: 180 tablet, Rfl: 3    acetaminophen (TYLENOL) 500 MG tablet, Take 2 tablets by mouth every 6 hours as needed for Pain, Disp: , Rfl:     ascorbic acid (VITAMIN C) 1000 MG tablet, Take 1 tablet by mouth every morning, Disp: , Rfl:     Cholecalciferol (VITAMIN D3) 50 MCG (2000 UT) CAPS, Take 1 capsule by mouth every morning, Disp: , Rfl:     vitamin B-12 (CYANOCOBALAMIN) 1000 MCG tablet, Take 1 tablet by mouth every morning, Disp: , Rfl:     albuterol (PROVENTIL) (2.5 MG/3ML) 0.083% nebulizer solution, Take 3 mLs by nebulization every 6 hours as needed for Wheezing, Disp: 360 each, Rfl: 3    mometasone-formoterol (DULERA) 200-5 MCG/ACT inhaler, Inhale 2 puffs into the lungs in the morning and 2 puffs in the evening., Disp: , Rfl:     fluticasone (FLONASE) 50 MCG/ACT nasal spray, 1 spray by Each Nostril route daily as needed for Rhinitis or Allergies, Disp: , Rfl:

## 2024-12-27 PROCEDURE — 9900000038 HC CARDIAC REHAB PHASE 3 - MONTHLY

## 2024-12-30 ENCOUNTER — HOSPITAL ENCOUNTER (OUTPATIENT)
Dept: CARDIAC REHAB | Age: 74
Discharge: HOME | End: 2024-12-30
Payer: MEDICARE

## 2024-12-31 ENCOUNTER — TELEPHONE (OUTPATIENT)
Dept: CARDIOLOGY CLINIC | Age: 74
End: 2024-12-31

## 2024-12-31 NOTE — TELEPHONE ENCOUNTER
Spoke with Jessica at [facility]. BP yesterday was 130/82, today was 126/70 at rest and  134/70 after exercise.  Patient did not record his HR either time.

## 2025-01-02 RX ORDER — AMLODIPINE BESYLATE 5 MG/1
5 TABLET ORAL DAILY
COMMUNITY

## 2025-01-27 ENCOUNTER — HOSPITAL ENCOUNTER (OUTPATIENT)
Dept: CARDIAC REHAB | Age: 75
Discharge: HOME | End: 2025-01-27
Payer: MEDICARE

## 2025-01-27 PROCEDURE — 9900000038 HC CARDIAC REHAB PHASE 3 - MONTHLY

## 2025-02-18 ENCOUNTER — OFFICE VISIT (OUTPATIENT)
Dept: PRIMARY CARE CLINIC | Age: 75
End: 2025-02-18

## 2025-02-18 VITALS
HEART RATE: 84 BPM | OXYGEN SATURATION: 97 % | SYSTOLIC BLOOD PRESSURE: 135 MMHG | BODY MASS INDEX: 24.96 KG/M2 | WEIGHT: 150 LBS | DIASTOLIC BLOOD PRESSURE: 82 MMHG | RESPIRATION RATE: 16 BRPM | TEMPERATURE: 97.8 F

## 2025-02-18 DIAGNOSIS — M81.0 AGE-RELATED OSTEOPOROSIS WITHOUT CURRENT PATHOLOGICAL FRACTURE: ICD-10-CM

## 2025-02-18 DIAGNOSIS — M05.79 RHEUMATOID ARTHRITIS INVOLVING MULTIPLE SITES WITH POSITIVE RHEUMATOID FACTOR (HCC): Primary | ICD-10-CM

## 2025-02-18 DIAGNOSIS — B18.1 CHRONIC HEPATITIS B (HCC): ICD-10-CM

## 2025-02-18 DIAGNOSIS — E78.2 MIXED HYPERLIPIDEMIA: ICD-10-CM

## 2025-02-18 DIAGNOSIS — I48.0 PAROXYSMAL ATRIAL FIBRILLATION (HCC): ICD-10-CM

## 2025-02-18 DIAGNOSIS — I10 ESSENTIAL HYPERTENSION: Chronic | ICD-10-CM

## 2025-02-18 DIAGNOSIS — E53.8 VITAMIN B 12 DEFICIENCY: ICD-10-CM

## 2025-02-18 DIAGNOSIS — E11.9 DIET-CONTROLLED DIABETES MELLITUS (HCC): ICD-10-CM

## 2025-02-18 DIAGNOSIS — J43.8 OTHER EMPHYSEMA (HCC): ICD-10-CM

## 2025-02-18 LAB
ALBUMIN: 4.4 G/DL (ref 3.5–5.2)
ALP BLD-CCNC: 53 U/L (ref 40–129)
ALT SERPL-CCNC: 34 U/L (ref 0–40)
ANION GAP SERPL CALCULATED.3IONS-SCNC: 14 MMOL/L (ref 7–16)
AST SERPL-CCNC: 40 U/L (ref 0–39)
BASOPHILS ABSOLUTE: 0.05 K/UL (ref 0–0.2)
BASOPHILS RELATIVE PERCENT: 1 % (ref 0–2)
BILIRUB SERPL-MCNC: 1.2 MG/DL (ref 0–1.2)
BILIRUBIN, URINE: NEGATIVE
BUN BLDV-MCNC: 21 MG/DL (ref 6–23)
CALCIUM SERPL-MCNC: 9.3 MG/DL (ref 8.6–10.2)
CHLORIDE BLD-SCNC: 109 MMOL/L (ref 98–107)
CHOLESTEROL, TOTAL: 123 MG/DL
CO2: 24 MMOL/L (ref 22–29)
COLOR, UA: YELLOW
COMMENT: ABNORMAL
CREAT SERPL-MCNC: 1 MG/DL (ref 0.7–1.2)
EOSINOPHILS ABSOLUTE: 0.28 K/UL (ref 0.05–0.5)
EOSINOPHILS RELATIVE PERCENT: 3 % (ref 0–6)
GFR, ESTIMATED: 76 ML/MIN/1.73M2
GLUCOSE BLD-MCNC: 91 MG/DL (ref 74–99)
GLUCOSE URINE: NEGATIVE MG/DL
HCT VFR BLD CALC: 43.3 % (ref 37–54)
HDLC SERPL-MCNC: 53 MG/DL
HEMOGLOBIN: 13.7 G/DL (ref 12.5–16.5)
IMMATURE GRANULOCYTES %: 1 % (ref 0–5)
IMMATURE GRANULOCYTES ABSOLUTE: 0.07 K/UL (ref 0–0.58)
KETONES, URINE: NEGATIVE MG/DL
LDL CHOLESTEROL: 63 MG/DL
LEUKOCYTE ESTERASE, URINE: NEGATIVE
LYMPHOCYTES ABSOLUTE: 2.32 K/UL (ref 1.5–4)
LYMPHOCYTES RELATIVE PERCENT: 28 % (ref 20–42)
MCH RBC QN AUTO: 31.9 PG (ref 26–35)
MCHC RBC AUTO-ENTMCNC: 31.6 G/DL (ref 32–34.5)
MCV RBC AUTO: 100.9 FL (ref 80–99.9)
MONOCYTES ABSOLUTE: 0.69 K/UL (ref 0.1–0.95)
MONOCYTES RELATIVE PERCENT: 8 % (ref 2–12)
NEUTROPHILS ABSOLUTE: 4.95 K/UL (ref 1.8–7.3)
NEUTROPHILS RELATIVE PERCENT: 59 % (ref 43–80)
NITRITE, URINE: NEGATIVE
PDW BLD-RTO: 13.4 % (ref 11.5–15)
PH, URINE: 6 (ref 5–8)
PLATELET # BLD: 160 K/UL (ref 130–450)
PMV BLD AUTO: 10.4 FL (ref 7–12)
POTASSIUM SERPL-SCNC: 4 MMOL/L (ref 3.5–5)
PROTEIN UA: NEGATIVE MG/DL
RBC # BLD: 4.29 M/UL (ref 3.8–5.8)
SODIUM BLD-SCNC: 147 MMOL/L (ref 132–146)
SPECIFIC GRAVITY UA: >1.03 (ref 1–1.03)
TOTAL PROTEIN: 6.5 G/DL (ref 6.4–8.3)
TRIGL SERPL-MCNC: 36 MG/DL
TURBIDITY: CLEAR
URINE HGB: NEGATIVE
UROBILINOGEN, URINE: 0.2 EU/DL (ref 0–1)
VITAMIN B-12: 1238 PG/ML (ref 211–946)
VITAMIN D 25-HYDROXY: 33.9 NG/ML (ref 30–100)
VLDLC SERPL CALC-MCNC: 7 MG/DL
WBC # BLD: 8.4 K/UL (ref 4.5–11.5)

## 2025-02-18 SDOH — ECONOMIC STABILITY: FOOD INSECURITY: WITHIN THE PAST 12 MONTHS, THE FOOD YOU BOUGHT JUST DIDN'T LAST AND YOU DIDN'T HAVE MONEY TO GET MORE.: NEVER TRUE

## 2025-02-18 SDOH — ECONOMIC STABILITY: FOOD INSECURITY: WITHIN THE PAST 12 MONTHS, YOU WORRIED THAT YOUR FOOD WOULD RUN OUT BEFORE YOU GOT MONEY TO BUY MORE.: NEVER TRUE

## 2025-02-18 ASSESSMENT — ENCOUNTER SYMPTOMS
GASTROINTESTINAL NEGATIVE: 1
ALLERGIC/IMMUNOLOGIC NEGATIVE: 1
EYES NEGATIVE: 1
RESPIRATORY NEGATIVE: 1

## 2025-02-18 ASSESSMENT — PATIENT HEALTH QUESTIONNAIRE - PHQ9
SUM OF ALL RESPONSES TO PHQ QUESTIONS 1-9: 0
1. LITTLE INTEREST OR PLEASURE IN DOING THINGS: NOT AT ALL
2. FEELING DOWN, DEPRESSED OR HOPELESS: NOT AT ALL
SUM OF ALL RESPONSES TO PHQ9 QUESTIONS 1 & 2: 0

## 2025-02-18 NOTE — PROGRESS NOTES
25  Name: Irving Butt    : 1950    Sex: male    Age: 74 y.o.        Subjective:  Chief Complaint: Patient is here for checkup regarding same.    He came 8 minutes late for his appointment.  I did not have his blood work till just before he walked in here        Review of Systems   Constitutional: Negative.    HENT: Negative.     Eyes: Negative.    Respiratory: Negative.     Cardiovascular: Negative.    Gastrointestinal: Negative.    Endocrine: Negative.    Genitourinary: Negative.    Musculoskeletal:  Positive for arthralgias.   Skin: Negative.    Allergic/Immunologic: Negative.    Neurological: Negative.    Hematological: Negative.    Psychiatric/Behavioral: Negative.           Current Outpatient Medications:     amLODIPine (NORVASC) 5 MG tablet, Take 1 tablet by mouth daily, Disp: , Rfl:     metoprolol succinate (TOPROL XL) 25 MG extended release tablet, Take 1 tablet by mouth daily, Disp: 90 tablet, Rfl: 3    Caltrate 600+D Plus Minerals (CALTRATE) 600-800 MG-UNIT TABS tablet, Take 1 tablet by mouth 2 times daily, Disp: , Rfl:     apixaban (ELIQUIS) 5 MG TABS tablet, Take 1 tablet by mouth 2 times daily, Disp: 180 tablet, Rfl: 3    acetaminophen (TYLENOL) 500 MG tablet, Take 2 tablets by mouth every 6 hours as needed for Pain, Disp: , Rfl:     ascorbic acid (VITAMIN C) 1000 MG tablet, Take 1 tablet by mouth every morning, Disp: , Rfl:     Cholecalciferol (VITAMIN D3) 50 MCG (2000 UT) CAPS, Take 1 capsule by mouth every morning, Disp: , Rfl:     vitamin B-12 (CYANOCOBALAMIN) 1000 MCG tablet, Take 1 tablet by mouth every morning, Disp: , Rfl:     albuterol (PROVENTIL) (2.5 MG/3ML) 0.083% nebulizer solution, Take 3 mLs by nebulization every 6 hours as needed for Wheezing, Disp: 360 each, Rfl: 3    mometasone-formoterol (DULERA) 200-5 MCG/ACT inhaler, Inhale 2 puffs into the lungs in the morning and 2 puffs in the evening., Disp: , Rfl:     fluticasone (FLONASE) 50 MCG/ACT nasal spray, 1 spray by Each

## 2025-02-24 ENCOUNTER — HOSPITAL ENCOUNTER (OUTPATIENT)
Dept: CARDIAC REHAB | Age: 75
Discharge: HOME | End: 2025-02-24
Payer: MEDICARE

## 2025-02-24 PROCEDURE — 9900000038 HC CARDIAC REHAB PHASE 3 - MONTHLY

## 2025-03-10 ENCOUNTER — APPOINTMENT (OUTPATIENT)
Dept: GENERAL RADIOLOGY | Age: 75
End: 2025-03-10
Payer: MEDICARE

## 2025-03-10 ENCOUNTER — HOSPITAL ENCOUNTER (EMERGENCY)
Age: 75
Discharge: HOME | End: 2025-03-10
Payer: MEDICARE

## 2025-03-10 VITALS
TEMPERATURE: 98.1 F | BODY MASS INDEX: 24.49 KG/M2 | HEIGHT: 65 IN | SYSTOLIC BLOOD PRESSURE: 146 MMHG | WEIGHT: 147 LBS | HEART RATE: 85 BPM | RESPIRATION RATE: 18 BRPM | DIASTOLIC BLOOD PRESSURE: 105 MMHG | OXYGEN SATURATION: 97 %

## 2025-03-10 DIAGNOSIS — S51.811A SKIN TEAR OF RIGHT FOREARM WITHOUT COMPLICATION, INITIAL ENCOUNTER: ICD-10-CM

## 2025-03-10 DIAGNOSIS — S22.32XA CLOSED FRACTURE OF ONE RIB OF LEFT SIDE, INITIAL ENCOUNTER: Primary | ICD-10-CM

## 2025-03-10 PROCEDURE — 6370000000 HC RX 637 (ALT 250 FOR IP): Performed by: PHYSICIAN ASSISTANT

## 2025-03-10 PROCEDURE — 6360000002 HC RX W HCPCS: Performed by: PHYSICIAN ASSISTANT

## 2025-03-10 PROCEDURE — 71101 X-RAY EXAM UNILAT RIBS/CHEST: CPT

## 2025-03-10 PROCEDURE — 99283 EMERGENCY DEPT VISIT LOW MDM: CPT

## 2025-03-10 RX ORDER — HYDROCODONE BITARTRATE AND ACETAMINOPHEN 5; 325 MG/1; MG/1
1 TABLET ORAL EVERY 6 HOURS PRN
Qty: 12 TABLET | Refills: 0 | Status: SHIPPED | OUTPATIENT
Start: 2025-03-10 | End: 2025-03-13

## 2025-03-10 RX ORDER — HYDROCODONE BITARTRATE AND ACETAMINOPHEN 5; 325 MG/1; MG/1
1 TABLET ORAL ONCE
Status: COMPLETED | OUTPATIENT
Start: 2025-03-10 | End: 2025-03-10

## 2025-03-10 RX ORDER — LIDOCAINE HYDROCHLORIDE AND EPINEPHRINE 10; 10 MG/ML; UG/ML
20 INJECTION, SOLUTION INFILTRATION; PERINEURAL ONCE
Status: DISCONTINUED | OUTPATIENT
Start: 2025-03-10 | End: 2025-03-10

## 2025-03-10 RX ADMIN — HYDROCODONE BITARTRATE AND ACETAMINOPHEN 1 TABLET: 5; 325 TABLET ORAL at 22:41

## 2025-03-10 RX ADMIN — LIDOCAINE HYDROCHLORIDE AND EPINEPHRINE 20 ML: 10; 10 INJECTION, SOLUTION INFILTRATION; PERINEURAL at 22:36

## 2025-03-10 ASSESSMENT — PAIN DESCRIPTION - ORIENTATION
ORIENTATION: RIGHT
ORIENTATION: LEFT;RIGHT
ORIENTATION: LEFT;RIGHT

## 2025-03-10 ASSESSMENT — PAIN DESCRIPTION - DESCRIPTORS
DESCRIPTORS: SHARP
DESCRIPTORS: ACHING

## 2025-03-10 ASSESSMENT — PAIN - FUNCTIONAL ASSESSMENT
PAIN_FUNCTIONAL_ASSESSMENT: PREVENTS OR INTERFERES WITH MANY ACTIVE NOT PASSIVE ACTIVITIES
PAIN_FUNCTIONAL_ASSESSMENT: ACTIVITIES ARE NOT PREVENTED
PAIN_FUNCTIONAL_ASSESSMENT: 0-10
PAIN_FUNCTIONAL_ASSESSMENT: ACTIVITIES ARE NOT PREVENTED

## 2025-03-10 ASSESSMENT — PAIN SCALES - GENERAL
PAINLEVEL_OUTOF10: 7
PAINLEVEL_OUTOF10: 5
PAINLEVEL_OUTOF10: 7

## 2025-03-10 ASSESSMENT — PAIN DESCRIPTION - LOCATION
LOCATION: RIB CAGE;ARM
LOCATION: ARM
LOCATION: ARM;BACK;RIB CAGE

## 2025-03-10 ASSESSMENT — PAIN DESCRIPTION - PAIN TYPE
TYPE: ACUTE PAIN
TYPE: ACUTE PAIN

## 2025-03-10 ASSESSMENT — PAIN DESCRIPTION - FREQUENCY: FREQUENCY: CONTINUOUS

## 2025-03-11 NOTE — ED PROVIDER NOTES
Independent MITZI Visit.      [facility]  Department of Emergency Medicine   ED  Encounter Note  Admit Date/RoomTime: 3/10/2025  9:53 PM  ED Room: 33/    NAME: Irving Butt  : 1950  MRN: [id number]     Chief Complaint:  Fall (fall, - head injury, -loc, takes eliquis. c/o L rib pain, skin tear to R forearm) and Rib Pain (injury)    History of Present Illness        Irving Butt is a 74 y.o. old male who presents to the emergency department by private vehicle, for traumatic sharp left, posterior thorax pain which occured 2 hour(s) prior to arrival. The complaint occurred as a result of a fall from stumbling while at home.  Patient has no prior history of pain/injury with regards to today's visit.  Since onset the symptoms have been persistent.  His symptoms are associated with nothing additional. His pain is aggraveated by touch and chest wall motion and relieved by rest.  He denies any head injury, loss of consciousness, neck pain, abdominal pain, or back pain.  He also has a skin tear of the right forearm.  Tetanus Status: up to date.    ROS   Pertinent positives and negatives are stated within HPI, all other systems reviewed and are negative.    Past Medical History:  has a past medical history of Acute cystitis with hematuria, Acute respiratory failure (HCC), Aneurysm artery, pulmonary (HCC), Arthritis, Asthma, Cancer (HCC), Community acquired pneumonia of left lower lobe of lung, Deviated septum, Gallbladder disease, Hepatitis B, Hypercholesterolemia, Hypertension, Kidney stone, Restless leg syndrome, and Rheumatoid arthritis(714.0).    Surgical History:  has a past surgical history that includes hernia repair (Left); cardiovascular stress test (); Tonsillectomy; hernia repair; Cataract removal (Bilateral, ); Colonoscopy; Diagnostic Cardiac Cath Lab Procedure; Vasectomy; Adenoidectomy; cyst removal; skin biopsy (2018); Cardiac valuve replacement (2020); Cardiac

## 2025-03-14 ENCOUNTER — OFFICE VISIT (OUTPATIENT)
Dept: PRIMARY CARE CLINIC | Age: 75
End: 2025-03-14

## 2025-03-14 VITALS
RESPIRATION RATE: 18 BRPM | HEART RATE: 87 BPM | TEMPERATURE: 97.2 F | SYSTOLIC BLOOD PRESSURE: 134 MMHG | WEIGHT: 145 LBS | DIASTOLIC BLOOD PRESSURE: 82 MMHG | BODY MASS INDEX: 24.13 KG/M2 | OXYGEN SATURATION: 99 %

## 2025-03-14 DIAGNOSIS — I10 ESSENTIAL HYPERTENSION: Chronic | ICD-10-CM

## 2025-03-14 DIAGNOSIS — S50.811A ABRASION OF RIGHT FOREARM, INITIAL ENCOUNTER: ICD-10-CM

## 2025-03-14 DIAGNOSIS — S20.212A CONTUSION OF LEFT CHEST WALL, INITIAL ENCOUNTER: Primary | ICD-10-CM

## 2025-03-14 DIAGNOSIS — J43.8 OTHER EMPHYSEMA (HCC): Chronic | ICD-10-CM

## 2025-03-14 DIAGNOSIS — S50.11XA CONTUSION OF RIGHT FOREARM, INITIAL ENCOUNTER: ICD-10-CM

## 2025-03-14 SDOH — ECONOMIC STABILITY: FOOD INSECURITY: WITHIN THE PAST 12 MONTHS, THE FOOD YOU BOUGHT JUST DIDN'T LAST AND YOU DIDN'T HAVE MONEY TO GET MORE.: NEVER TRUE

## 2025-03-14 SDOH — ECONOMIC STABILITY: FOOD INSECURITY: WITHIN THE PAST 12 MONTHS, YOU WORRIED THAT YOUR FOOD WOULD RUN OUT BEFORE YOU GOT MONEY TO BUY MORE.: NEVER TRUE

## 2025-03-14 NOTE — PROGRESS NOTES
Irving Butt (:  1950) is a 74 y.o. male, Established patient, here for evaluation of the following chief complaint(s):  Follow-Up from Hospital and Wound Check (Rt arm, has had the dressing on for 5 days)            Subjective   History of Present Illness  The patient presents for evaluation of a fall.    He experienced a fall on 2025 while ascending the steps in his garage, resulting in an injury to his right arm and back. He did not lose consciousness during the incident. The impact was primarily absorbed by the railing, causing a laceration on his right arm, and subsequently, the concrete step struck his back and legs. He sought immediate medical attention at the emergency room where he was diagnosed with fractures in the 6th and 9th ribs, which are minimally displaced. He was prescribed analgesics but opted to manage his pain with Tylenol. He has been utilizing a spirometer for respiratory exercises, which does not exacerbate his pain. However, he reports discomfort when lying on the affected side. He also mentions experiencing significant tingling sensations. His blood pressure was elevated during his ER visit. He has a past medical history of rib fractures sustained in the .    MEDICATIONS  Current: Tylenol, Eliquis    Review of Systems:  Constitutional:  No fever, no fatigue, no chills, no headaches, no weight change  Dermatology:  No rash, no mole, no dry or sensitive skin  ENT:  No cough, no sore throat, no sinus pain, no runny nose, no ear pain  Cardiology:  No chest pain, no palpitations, no leg edema, no shortness of breath, no PND  Gastroenterology:  No dysphagia, no abdominal pain, no nausea, no vomiting, no constipation, no diarrhea, no heartburn  Musculoskeletal:  No joint pain, no leg cramps, no back pain, no muscle aches  Respiratory:  No shortness of breath, no orthopnea, no wheezing, no TAPIA, no hemoptysis  Urology:  No blood in the urine, no urinary frequency, no urinary

## 2025-03-21 PROCEDURE — 9900000038 HC CARDIAC REHAB PHASE 3 - MONTHLY

## 2025-03-31 ENCOUNTER — HOSPITAL ENCOUNTER (OUTPATIENT)
Dept: CARDIAC REHAB | Age: 75
Discharge: HOME | End: 2025-03-31
Payer: MEDICARE

## 2025-04-16 ENCOUNTER — OFFICE VISIT (OUTPATIENT)
Dept: GERIATRIC MEDICINE | Age: 75
End: 2025-04-16
Payer: MEDICARE

## 2025-04-16 VITALS
TEMPERATURE: 98.2 F | HEART RATE: 74 BPM | WEIGHT: 150 LBS | DIASTOLIC BLOOD PRESSURE: 82 MMHG | SYSTOLIC BLOOD PRESSURE: 126 MMHG | BODY MASS INDEX: 24.96 KG/M2 | OXYGEN SATURATION: 97 %

## 2025-04-16 DIAGNOSIS — G31.84 MCI (MILD COGNITIVE IMPAIRMENT): Primary | ICD-10-CM

## 2025-04-16 PROCEDURE — 1159F MED LIST DOCD IN RCRD: CPT | Performed by: INTERNAL MEDICINE

## 2025-04-16 PROCEDURE — G8427 DOCREV CUR MEDS BY ELIG CLIN: HCPCS | Performed by: INTERNAL MEDICINE

## 2025-04-16 PROCEDURE — 3017F COLORECTAL CA SCREEN DOC REV: CPT | Performed by: INTERNAL MEDICINE

## 2025-04-16 PROCEDURE — 1123F ACP DISCUSS/DSCN MKR DOCD: CPT | Performed by: INTERNAL MEDICINE

## 2025-04-16 PROCEDURE — 3079F DIAST BP 80-89 MM HG: CPT | Performed by: INTERNAL MEDICINE

## 2025-04-16 PROCEDURE — G8420 CALC BMI NORM PARAMETERS: HCPCS | Performed by: INTERNAL MEDICINE

## 2025-04-16 PROCEDURE — 99213 OFFICE O/P EST LOW 20 MIN: CPT | Performed by: INTERNAL MEDICINE

## 2025-04-16 PROCEDURE — 3074F SYST BP LT 130 MM HG: CPT | Performed by: INTERNAL MEDICINE

## 2025-04-16 PROCEDURE — 1036F TOBACCO NON-USER: CPT | Performed by: INTERNAL MEDICINE

## 2025-04-16 NOTE — PROGRESS NOTES
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

## 2025-04-28 ENCOUNTER — HOSPITAL ENCOUNTER (OUTPATIENT)
Dept: CARDIAC REHAB | Age: 75
Discharge: HOME | End: 2025-04-28
Payer: MEDICARE

## 2025-04-28 PROCEDURE — 9900000038 HC CARDIAC REHAB PHASE 3 - MONTHLY

## 2025-05-06 ENCOUNTER — OFFICE VISIT (OUTPATIENT)
Dept: FAMILY MEDICINE CLINIC | Age: 75
End: 2025-05-06

## 2025-05-06 VITALS
WEIGHT: 151 LBS | HEIGHT: 65 IN | OXYGEN SATURATION: 98 % | DIASTOLIC BLOOD PRESSURE: 80 MMHG | BODY MASS INDEX: 25.16 KG/M2 | SYSTOLIC BLOOD PRESSURE: 116 MMHG | TEMPERATURE: 97.2 F

## 2025-05-06 DIAGNOSIS — J32.9 SINOBRONCHITIS: Primary | ICD-10-CM

## 2025-05-06 DIAGNOSIS — J40 SINOBRONCHITIS: Primary | ICD-10-CM

## 2025-05-06 DIAGNOSIS — J45.41 MODERATE PERSISTENT ASTHMA WITH EXACERBATION: ICD-10-CM

## 2025-05-06 LAB
INFLUENZA A ANTIBODY: NEGATIVE
INFLUENZA B ANTIBODY: NEGATIVE
S PYO AG THROAT QL: NORMAL

## 2025-05-06 RX ORDER — BENZONATATE 200 MG/1
200 CAPSULE ORAL 3 TIMES DAILY PRN
Qty: 15 CAPSULE | Refills: 0 | Status: SHIPPED | OUTPATIENT
Start: 2025-05-06

## 2025-05-06 RX ORDER — DOXYCYCLINE HYCLATE 100 MG
100 TABLET ORAL 2 TIMES DAILY
Qty: 20 TABLET | Refills: 0 | Status: SHIPPED | OUTPATIENT
Start: 2025-05-06 | End: 2025-05-16

## 2025-05-06 RX ORDER — PREDNISONE 20 MG/1
20 TABLET ORAL 2 TIMES DAILY
Qty: 10 TABLET | Refills: 0 | Status: SHIPPED | OUTPATIENT
Start: 2025-05-06 | End: 2025-05-11

## 2025-05-06 NOTE — PROGRESS NOTES
Chief Complaint       Sinus Problem (Throat burning. Started 2 weeks ago. Allegra, Flonase little relief.) and Cough (Occasionally productive, hx of asthma and pneumonia, Nyquil)      History of Present Illness   Source of history provided by:  patient.    Irving Butt is a 74 y.o. old male presenting to the walk in clinic for evaluation of sinus pressure, nasal congestion, productive cough, chest congestion, coughing fits, and a burning sensation in his throat which has been present for the past 2 weeks. Denies any fever, CP, dyspnea, LE edema, abdominal pain, vomiting, rash, or lethargy.  Patient does have a known history of asthma which is typically controlled by daily Dulera and as needed albuterol.  Patient is especially concerned as he is leaving to go out of the country in a few weeks and does have a history of pneumonia in the past.  Patient denies recent sick exposures. Patient has been taking Allegra and Flonase nasal spray OTC without symptomatic relief.      ROS    Unless otherwise stated in this report or unable to obtain because of the patient's clinical or mental status as evidenced by the medical record, this patients's positive and negative responses for Review of Systems, constitutional, psych, eyes, ENT, cardiovascular, respiratory, gastrointestinal, neurological, genitourinary, musculoskeletal, integument systems and systems related to the presenting problem are either stated in the preceding or were not pertinent or were negative for the symptoms and/or complaints related to the medical problem.    Past Medical History:  has a past medical history of Acute cystitis with hematuria, Acute respiratory failure (HCC), Aneurysm artery, pulmonary (HCC), Arthritis, Asthma, Cancer (HCC), Community acquired pneumonia of left lower lobe of lung, Deviated septum, Gallbladder disease, Hepatitis B, Hypercholesterolemia, Hypertension, Kidney stone, Restless leg syndrome, and Rheumatoid

## 2025-05-09 ENCOUNTER — OFFICE VISIT (OUTPATIENT)
Dept: CARDIOLOGY CLINIC | Age: 75
End: 2025-05-09

## 2025-05-09 VITALS
OXYGEN SATURATION: 97 % | DIASTOLIC BLOOD PRESSURE: 80 MMHG | WEIGHT: 147.8 LBS | TEMPERATURE: 97 F | BODY MASS INDEX: 23.75 KG/M2 | RESPIRATION RATE: 18 BRPM | HEIGHT: 66 IN | SYSTOLIC BLOOD PRESSURE: 144 MMHG | HEART RATE: 65 BPM

## 2025-05-09 DIAGNOSIS — I10 ESSENTIAL HYPERTENSION: Chronic | ICD-10-CM

## 2025-05-09 DIAGNOSIS — I34.0 MITRAL VALVE INSUFFICIENCY, UNSPECIFIED ETIOLOGY: ICD-10-CM

## 2025-05-09 DIAGNOSIS — I35.1 AORTIC VALVE INSUFFICIENCY, ETIOLOGY OF CARDIAC VALVE DISEASE UNSPECIFIED: Primary | ICD-10-CM

## 2025-05-09 DIAGNOSIS — R01.1 HEART MURMUR: ICD-10-CM

## 2025-05-09 DIAGNOSIS — I48.0 PAROXYSMAL ATRIAL FIBRILLATION (HCC): ICD-10-CM

## 2025-05-09 DIAGNOSIS — Z95.2 S/P AVR: ICD-10-CM

## 2025-05-09 DIAGNOSIS — I38 HEART VALVE DISEASE: ICD-10-CM

## 2025-05-09 RX ORDER — LOSARTAN POTASSIUM 25 MG/1
25 TABLET ORAL DAILY
Qty: 90 TABLET | Refills: 3 | Status: SHIPPED | OUTPATIENT
Start: 2025-05-09

## 2025-05-09 NOTE — PROGRESS NOTES
Irving Butt  1950  Date of Service: 5/9/2025    Patient Active Problem List    Diagnosis Date Noted    Diet-controlled diabetes mellitus (HCC) 02/21/2023     Priority: Medium    Secondary hypercoagulable state 08/21/2024    Bradycardia 05/15/2024    Impaired memory 02/21/2024    Herniation of lumbar intervertebral disc with radiculopathy 08/21/2023    Right leg pain 06/20/2023    Vitamin B12 deficiency (dietary) anemia 02/18/2022    Vitamin D deficiency 02/18/2022    Thrombocytopenia, unspecified 02/18/2022    Kidney stone 01/28/2022    Gallbladder sludge 01/28/2022    Rheumatoid arthritis involving multiple sites with positive rheumatoid factor (HCC) 01/27/2022    Paroxysmal atrial fibrillation (HCC) 05/09/2021    Chronic hepatitis B (HCC) 11/23/2020    Thoracic aortic aneurysm without rupture 02/02/2020    Basal cell carcinoma of skin of face 04/03/2018     Overview Note:     cheek      Coronary atherosclerosis 04/03/2018     Overview Note:     Overview:   was told his 1993 cath showed \"some blockage but not enough\" to intervene (done for CP)      MR (mitral regurgitation) 04/03/2018    GERD (gastroesophageal reflux disease) 04/03/2018    Mixed hyperlipidemia 04/03/2018    Macular degeneration 04/03/2018    Asthma 04/03/2018     Overview Note:     Overview:   had been on prednisone for 30 years, recently PCP has told him to temporarily stop prednisone for a trial \"to help prevent side effects\" per pt      AR (aortic regurgitation) 04/03/2018    Pulmonary emphysema (HCC) 11/30/2016    Lumbar spinal stenosis 11/22/2016    Essential hypertension 11/22/2016    Kidney cyst, acquired 11/22/2016    Heart valve disease 06/04/2012       Social History     Socioeconomic History    Marital status:      Spouse name: None    Number of children: None    Years of education: None    Highest education level: None   Tobacco Use    Smoking status: Former     Current packs/day: 0.00     Average packs/day: 2.5

## 2025-05-12 ENCOUNTER — HOSPITAL ENCOUNTER (OUTPATIENT)
Dept: CARDIOLOGY | Age: 75
Discharge: HOME | End: 2025-05-14
Attending: INTERNAL MEDICINE
Payer: MEDICARE

## 2025-05-12 VITALS
SYSTOLIC BLOOD PRESSURE: 144 MMHG | WEIGHT: 147 LBS | BODY MASS INDEX: 24.49 KG/M2 | HEIGHT: 65 IN | DIASTOLIC BLOOD PRESSURE: 80 MMHG

## 2025-05-12 DIAGNOSIS — R01.1 HEART MURMUR: ICD-10-CM

## 2025-05-12 PROCEDURE — 93306 TTE W/DOPPLER COMPLETE: CPT

## 2025-05-14 ENCOUNTER — ANESTHESIA EVENT (OUTPATIENT)
Age: 75
End: 2025-05-14
Payer: MEDICARE

## 2025-05-14 ENCOUNTER — RESULTS FOLLOW-UP (OUTPATIENT)
Dept: CARDIOLOGY | Age: 75
End: 2025-05-14

## 2025-05-14 ENCOUNTER — RESULTS FOLLOW-UP (OUTPATIENT)
Dept: FAMILY MEDICINE CLINIC | Age: 75
End: 2025-05-14

## 2025-05-14 ENCOUNTER — HOSPITAL ENCOUNTER (OUTPATIENT)
Age: 75
Discharge: HOME | End: 2025-05-16
Attending: INTERNAL MEDICINE
Payer: MEDICARE

## 2025-05-14 ENCOUNTER — OFFICE VISIT (OUTPATIENT)
Dept: FAMILY MEDICINE CLINIC | Age: 75
End: 2025-05-14

## 2025-05-14 ENCOUNTER — TELEPHONE (OUTPATIENT)
Dept: CARDIOLOGY CLINIC | Age: 75
End: 2025-05-14

## 2025-05-14 ENCOUNTER — ANESTHESIA (OUTPATIENT)
Age: 75
End: 2025-05-14
Payer: MEDICARE

## 2025-05-14 VITALS
SYSTOLIC BLOOD PRESSURE: 128 MMHG | OXYGEN SATURATION: 96 % | WEIGHT: 148 LBS | TEMPERATURE: 98.7 F | HEART RATE: 58 BPM | RESPIRATION RATE: 18 BRPM | HEIGHT: 65 IN | DIASTOLIC BLOOD PRESSURE: 68 MMHG | BODY MASS INDEX: 24.66 KG/M2

## 2025-05-14 VITALS
DIASTOLIC BLOOD PRESSURE: 75 MMHG | SYSTOLIC BLOOD PRESSURE: 117 MMHG | TEMPERATURE: 98.2 F | HEART RATE: 58 BPM | RESPIRATION RATE: 18 BRPM | OXYGEN SATURATION: 95 %

## 2025-05-14 DIAGNOSIS — I48.0 PAROXYSMAL ATRIAL FIBRILLATION (HCC): Primary | ICD-10-CM

## 2025-05-14 DIAGNOSIS — I35.1 AORTIC VALVE INSUFFICIENCY, ETIOLOGY OF CARDIAC VALVE DISEASE UNSPECIFIED: ICD-10-CM

## 2025-05-14 DIAGNOSIS — I48.0 PAROXYSMAL ATRIAL FIBRILLATION (HCC): ICD-10-CM

## 2025-05-14 DIAGNOSIS — J45.41 MODERATE PERSISTENT ASTHMA WITH EXACERBATION: Primary | ICD-10-CM

## 2025-05-14 DIAGNOSIS — Z79.899 ON AMIODARONE THERAPY: ICD-10-CM

## 2025-05-14 DIAGNOSIS — R05.9 COUGH, UNSPECIFIED TYPE: ICD-10-CM

## 2025-05-14 DIAGNOSIS — I48.0 PAROXYSMAL ATRIAL FIBRILLATION (HCC): Chronic | ICD-10-CM

## 2025-05-14 LAB
ECHO AO ASC DIAM: 2.5 CM
ECHO AO ASCENDING AORTA INDEX: 1.44 CM/M2
ECHO AV AREA PEAK VELOCITY: 1.4 CM2
ECHO AV AREA VTI: 1.3 CM2
ECHO AV AREA/BSA PEAK VELOCITY: 0.8 CM2/M2
ECHO AV AREA/BSA VTI: 0.7 CM2/M2
ECHO AV CUSP MM: 2 CM
ECHO AV MEAN GRADIENT: 5 MMHG
ECHO AV MEAN VELOCITY: 1.1 M/S
ECHO AV PEAK GRADIENT: 10 MMHG
ECHO AV PEAK VELOCITY: 1.6 M/S
ECHO AV VELOCITY RATIO: 0.5
ECHO AV VTI: 32.9 CM
ECHO BSA: 1.75 M2
ECHO EST RA PRESSURE: 3 MMHG
ECHO LA DIAMETER INDEX: 2.82 CM/M2
ECHO LA DIAMETER: 4.9 CM
ECHO LA VOL A-L A2C: 75 ML (ref 18–58)
ECHO LA VOL A-L A4C: 91 ML (ref 18–58)
ECHO LA VOL MOD A2C: 73 ML (ref 18–58)
ECHO LA VOL MOD A4C: 82 ML (ref 18–58)
ECHO LA VOLUME AREA LENGTH: 86 ML
ECHO LA VOLUME INDEX A-L A2C: 43 ML/M2 (ref 16–34)
ECHO LA VOLUME INDEX A-L A4C: 52 ML/M2 (ref 16–34)
ECHO LA VOLUME INDEX AREA LENGTH: 49 ML/M2 (ref 16–34)
ECHO LA VOLUME INDEX MOD A2C: 42 ML/M2 (ref 16–34)
ECHO LA VOLUME INDEX MOD A4C: 47 ML/M2 (ref 16–34)
ECHO LV EF PHYSICIAN: 53 %
ECHO LV FRACTIONAL SHORTENING: 35 % (ref 28–44)
ECHO LV INTERNAL DIMENSION DIASTOLE INDEX: 2.93 CM/M2
ECHO LV INTERNAL DIMENSION DIASTOLIC: 5.1 CM (ref 4.2–5.9)
ECHO LV INTERNAL DIMENSION SYSTOLIC INDEX: 1.9 CM/M2
ECHO LV INTERNAL DIMENSION SYSTOLIC: 3.3 CM
ECHO LV ISOVOLUMETRIC RELAXATION TIME (IVRT): 73.8 MS
ECHO LV IVSD: 0.9 CM (ref 0.6–1)
ECHO LV IVSS: 1.3 CM
ECHO LV MASS 2D: 163.6 G (ref 88–224)
ECHO LV MASS INDEX 2D: 94 G/M2 (ref 49–115)
ECHO LV POSTERIOR WALL DIASTOLIC: 0.9 CM (ref 0.6–1)
ECHO LV POSTERIOR WALL SYSTOLIC: 1.3 CM
ECHO LV RELATIVE WALL THICKNESS RATIO: 0.35
ECHO LVOT AREA: 2.8 CM2
ECHO LVOT AV VTI INDEX: 0.46
ECHO LVOT DIAM: 1.9 CM
ECHO LVOT MEAN GRADIENT: 1 MMHG
ECHO LVOT PEAK GRADIENT: 2 MMHG
ECHO LVOT PEAK VELOCITY: 0.8 M/S
ECHO LVOT STROKE VOLUME INDEX: 24.4 ML/M2
ECHO LVOT SV: 42.5 ML
ECHO LVOT VTI: 15 CM
ECHO MV AREA PHT: 3.8 CM2
ECHO MV AREA VTI: 2.3 CM2
ECHO MV E DECELERATION TIME (DT): 240.9 MS
ECHO MV EROA PISA: 0.1 CM2
ECHO MV LVOT VTI INDEX: 1.22
ECHO MV MAX VELOCITY: 0.9 M/S
ECHO MV MEAN GRADIENT: 1 MMHG
ECHO MV MEAN VELOCITY: 0.4 M/S
ECHO MV PEAK GRADIENT: 3 MMHG
ECHO MV PRESSURE HALF TIME (PHT): 58.3 MS
ECHO MV REGURGITANT ALIASING (NYQUIST) VELOCITY: 37 CM/S
ECHO MV REGURGITANT RADIUS PISA: 0.5 CM
ECHO MV REGURGITANT VELOCITY PISA: 5.1 M/S
ECHO MV REGURGITANT VOLUME PISA: 21.7 ML
ECHO MV REGURGITANT VTIA: 190.5 CM
ECHO MV VTI: 18.3 CM
ECHO PULMONARY ARTERY END DIASTOLIC PRESSURE: 4 MMHG
ECHO PV MAX VELOCITY: 0.8 M/S
ECHO PV MEAN GRADIENT: 1 MMHG
ECHO PV MEAN VELOCITY: 0.5 M/S
ECHO PV PEAK GRADIENT: 2 MMHG
ECHO PV REGURGITANT MAX VELOCITY: 1.1 M/S
ECHO PV VTI: 13.8 CM
ECHO PVEIN PEAK D VELOCITY: 0.9 M/S
ECHO PVEIN PEAK S VELOCITY: 0.4 M/S
ECHO PVEIN S/D RATIO: 0.4
ECHO RIGHT VENTRICULAR SYSTOLIC PRESSURE (RVSP): 22 MMHG
ECHO RV INTERNAL DIMENSION: 1.9 CM
ECHO TV REGURGITANT MAX VELOCITY: 2.17 M/S
ECHO TV REGURGITANT PEAK GRADIENT: 19 MMHG
EKG ATRIAL RATE: 58 BPM
EKG Q-T INTERVAL: 408 MS
EKG QRS DURATION: 108 MS
EKG QTC CALCULATION (BAZETT): 437 MS
EKG R AXIS: 6 DEGREES
EKG T AXIS: 89 DEGREES
EKG VENTRICULAR RATE: 69 BPM

## 2025-05-14 PROCEDURE — 6360000002 HC RX W HCPCS: Performed by: NURSE ANESTHETIST, CERTIFIED REGISTERED

## 2025-05-14 PROCEDURE — 7100000011 HC PHASE II RECOVERY - ADDTL 15 MIN: Performed by: INTERNAL MEDICINE

## 2025-05-14 PROCEDURE — 3700000000 HC ANESTHESIA ATTENDED CARE: Performed by: INTERNAL MEDICINE

## 2025-05-14 PROCEDURE — 6370000000 HC RX 637 (ALT 250 FOR IP): Performed by: INTERNAL MEDICINE

## 2025-05-14 PROCEDURE — 2580000003 HC RX 258: Performed by: NURSE ANESTHETIST, CERTIFIED REGISTERED

## 2025-05-14 PROCEDURE — 92960 CARDIOVERSION ELECTRIC EXT: CPT

## 2025-05-14 PROCEDURE — 3700000001 HC ADD 15 MINUTES (ANESTHESIA): Performed by: INTERNAL MEDICINE

## 2025-05-14 PROCEDURE — 7100000010 HC PHASE II RECOVERY - FIRST 15 MIN: Performed by: INTERNAL MEDICINE

## 2025-05-14 PROCEDURE — 93005 ELECTROCARDIOGRAM TRACING: CPT | Performed by: INTERNAL MEDICINE

## 2025-05-14 RX ORDER — PROPOFOL 10 MG/ML
INJECTION, EMULSION INTRAVENOUS
Status: DISCONTINUED | OUTPATIENT
Start: 2025-05-14 | End: 2025-05-14 | Stop reason: SDUPTHER

## 2025-05-14 RX ORDER — AMIODARONE HYDROCHLORIDE 100 MG/1
100 TABLET ORAL DAILY
Qty: 10 TABLET | Refills: 0 | Status: SHIPPED | OUTPATIENT
Start: 2025-05-17 | End: 2025-05-27

## 2025-05-14 RX ORDER — AMIODARONE HYDROCHLORIDE 200 MG/1
200 TABLET ORAL 2 TIMES DAILY
Status: DISCONTINUED | OUTPATIENT
Start: 2025-05-14 | End: 2025-05-17 | Stop reason: HOSPADM

## 2025-05-14 RX ORDER — PREDNISONE 20 MG/1
20 TABLET ORAL DAILY
Qty: 5 TABLET | Refills: 0 | Status: SHIPPED | OUTPATIENT
Start: 2025-05-14 | End: 2025-05-19

## 2025-05-14 RX ORDER — AMIODARONE HYDROCHLORIDE 200 MG/1
200 TABLET ORAL 2 TIMES DAILY
Qty: 6 TABLET | Refills: 0 | Status: SHIPPED | OUTPATIENT
Start: 2025-05-14 | End: 2025-05-17

## 2025-05-14 RX ORDER — DOXYCYCLINE HYCLATE 100 MG
100 TABLET ORAL 2 TIMES DAILY
Qty: 10 TABLET | Refills: 0 | Status: SHIPPED | OUTPATIENT
Start: 2025-05-14 | End: 2025-05-19

## 2025-05-14 RX ORDER — SODIUM CHLORIDE 9 MG/ML
INJECTION, SOLUTION INTRAVENOUS
Status: DISCONTINUED | OUTPATIENT
Start: 2025-05-14 | End: 2025-05-14 | Stop reason: SDUPTHER

## 2025-05-14 RX ORDER — AMIODARONE HYDROCHLORIDE 200 MG/1
100 TABLET ORAL DAILY
Status: DISCONTINUED | OUTPATIENT
Start: 2025-05-17 | End: 2025-05-17 | Stop reason: HOSPADM

## 2025-05-14 RX ADMIN — PROPOFOL 70 MG: 10 INJECTION, EMULSION INTRAVENOUS at 13:33

## 2025-05-14 RX ADMIN — SODIUM CHLORIDE: 9 INJECTION, SOLUTION INTRAVENOUS at 13:32

## 2025-05-14 RX ADMIN — PROPOFOL 60 MG: 10 INJECTION, EMULSION INTRAVENOUS at 13:50

## 2025-05-14 RX ADMIN — AMIODARONE HYDROCHLORIDE 200 MG: 200 TABLET ORAL at 14:47

## 2025-05-14 ASSESSMENT — LIFESTYLE VARIABLES: SMOKING_STATUS: 0

## 2025-05-14 NOTE — ANESTHESIA PRE PROCEDURE
Department of Anesthesiology  Preprocedure Note       Name:  Irving Butt   Age:  74 y.o.  :  1950                                          MRN:  [id number]         Date:  2025      Surgeon: Kel    Procedure: DCCV    Medications prior to admission:   Prior to Admission medications    Medication Sig Start Date End Date Taking? Authorizing Provider   losartan (COZAAR) 25 MG tablet Take 1 tablet by mouth daily 25   Hernandez Begum DO   doxycycline hyclate (VIBRA-TABS) 100 MG tablet Take 1 tablet by mouth 2 times daily for 10 days 25  Yoana Baird PA-C   benzonatate (TESSALON) 200 MG capsule Take 1 capsule by mouth 3 times daily as needed for Cough  Patient not taking: Reported on 2025   Yoana Baird PA-C   amLODIPine (NORVASC) 5 MG tablet Take 1 tablet by mouth daily    ProviderSky MD   metoprolol succinate (TOPROL XL) 25 MG extended release tablet Take 1 tablet by mouth daily 24   Hernandez Begum DO   Caltrate 600+D Plus Minerals (CALTRATE) 600-800 MG-UNIT TABS tablet Take 1 tablet by mouth 2 times daily    Sky Valentin MD   apixaban (ELIQUIS) 5 MG TABS tablet Take 1 tablet by mouth 2 times daily 1/10/24   Hernandez Begum DO   acetaminophen (TYLENOL) 500 MG tablet Take 2 tablets by mouth every 6 hours as needed for Pain    Sky Valentin MD   ascorbic acid (VITAMIN C) 1000 MG tablet Take 1 tablet by mouth every morning    Sky Valentin MD   vitamin B-12 (CYANOCOBALAMIN) 1000 MCG tablet Take 1 tablet by mouth every morning    Sky Valentin MD   albuterol (PROVENTIL) (2.5 MG/3ML) 0.083% nebulizer solution Take 3 mLs by nebulization every 6 hours as needed for Wheezing 22   Gómez Aguilar DO   mometasone-formoterol (DULERA) 200-5 MCG/ACT inhaler Inhale 2 puffs into the lungs in the morning and 2 puffs in the evening.    Sky Valentin MD   fluticasone (FLONASE) 50 MCG/ACT nasal spray 1 spray by

## 2025-05-14 NOTE — DISCHARGE INSTRUCTIONS
Cardioversion     Possible Complications   If you are planning to have cardioversion, your doctor will review a list of possible complications, which may include:   Inability to stop the abnormal rhythm    Abnormal rhythm is resumed after a normal rhythm was established    Development of a more dangerous dysrhythmia    Damage to the heart muscle    Blood clots introduced into your circulation, leading to such complications as stroke or damage to your organs    Burning or irritation to the skin of the chest where the paddles or electrodes are applied      What to Expect     Post-procedure Care   At Home    Be sure to follow your doctor's instructions . You may be put on blood thinners for a few weeks after the procedure. In this case, blood levels of these medicines will need to be monitored via blood tests, usually weekly. You may also be put on a medicine called an anti-arrhythmic. This type of drug will help prevent the abnormal heartbeat from happening again.     Call Your Doctor   After you leave the hospital, contact your doctor if any of the following occurs:   Blisters, redness, or open sores on your chest    Confusion, lightheadedness, or dizziness    Sensation of your heart fluttering (palpitations)    Sensation of a skipped or missed hearbeat, or an irregular pulse    Cough, difficulty breathing, shortness of breath    Severe nausea or vomiting    Chest pain or pain in your left arm or jaw    Pain in your abdomen, back, arms, or legs    Blood in your urine    Changes in vision or speech    Difficulty walking or using your limbs    Drooping facial muscles      In case of an emergency,  CALL 911  .

## 2025-05-14 NOTE — TELEPHONE ENCOUNTER
Per Dr Begum [facility], TSH next Monday.   Twelve-lead ECG in our office next Monday or Tuesday.  He is leaving for [place] on Wednesday.     Left voicemail to call the office. Lab orders placed in chart, not scheduled

## 2025-05-14 NOTE — ANESTHESIA POSTPROCEDURE EVALUATION
Department of Anesthesiology  Postprocedure Note    Patient: Irving Butt  MRN: [id number]  YOB: 1950  Date of evaluation: 5/14/2025    Procedure Summary       Date: 05/14/25 Room / Location: [facility] Cardiac Cath Lab    Anesthesia Start: 1331 Anesthesia Stop: 1357    Procedure: CARDIOVERSION EXTERNAL Diagnosis: Paroxysmal atrial fibrillation (HCC)    Scheduled Providers: Ne Acosta MD Responsible Provider: Ne Acosta MD    Anesthesia Type: MAC ASA Status: 4            Anesthesia Type: No value filed.    Sabra Phase I:      Sabra Phase II:      Anesthesia Post Evaluation    Patient location during evaluation: bedside  Patient participation: complete - patient cannot participate  Level of consciousness: awake and alert  Airway patency: patent  Nausea & Vomiting: no nausea and no vomiting  Cardiovascular status: blood pressure returned to baseline  Respiratory status: acceptable  Hydration status: euvolemic  Multimodal analgesia pain management approach    No notable events documented.

## 2025-05-14 NOTE — PROGRESS NOTES
25  Irving Butt : 1950 Sex: male  Age 74 y.o.      Subjective:  Chief Complaint   Patient presents with    Cough         HPI:     History of Present Illness  The patient is a 74-year-old male who presents for evaluation of suspected pneumonia.    He was previously evaluated by Yoana, who initiated a treatment regimen of doxycycline and prednisone 40 mg for 5 days. He completed the 10-day course of doxycycline this morning. During his cardioversion procedure at [facility] today, there was a concern raised about a potential pneumonia diagnosis. Despite receiving two injections, there was no improvement in his condition. An x-ray was not performed due to time constraints. He reports no fever or chills. He also reports no lower extremity edema.    He has been adhering to his prescribed respiratory therapy, which he administers four times daily for his asthma.            ROS:   Unless otherwise stated in this report the patient's positive and negative responses for review of systems for constitutional, eyes, ENT, cardiovascular, respiratory, gastrointestinal, neurological, , musculoskeletal, and integument systems and related systems to the presenting problem are either stated in the history of present illness or were not pertinent or were negative for the symptoms and/or complaints related to the presenting medical problem.  Positives and pertinent negatives as per HPI.  All others reviewed and are negative.      PMH:     Past Medical History:   Diagnosis Date    Acute cystitis with hematuria 2023    Acute respiratory failure (HCC) 2022    Aneurysm artery, pulmonary (HCC)     Arthritis     rheumatoid    Asthma 1974    status asthmaticus in 3/2004.    Cancer (HCC)     basal cell to face    Community acquired pneumonia of left lower lobe of lung     resolved    Deviated septum     S/P repair x 3.    Gallbladder disease     Hepatitis B     Hypercholesterolemia     Intolerance to statins.

## 2025-05-14 NOTE — PROCEDURES
Cardioversion    Procedure: Cardioversion    Indication: Recurring atrial fibrillation    Complication: None    Sedation: Performed by anesthesia    Summary of procedure:  Patient was taken to the specials unit.  Sedation was performed by anesthesia.  It was verified that he has not missed any doses of Eliquis for well over a month.  He was cardioverted with 250 J to sinus rhythm.  After few minutes he reverted back to atrial fibrillation.  He was resedated and cardioverted with 300 J to sinus rhythm again.  He Toller procedure well with no complications.  He will be given a short course of amiodarone.

## 2025-05-15 LAB
EKG ATRIAL RATE: 70 BPM
EKG Q-T INTERVAL: 424 MS
EKG QRS DURATION: 114 MS
EKG QTC CALCULATION (BAZETT): 430 MS
EKG R AXIS: 4 DEGREES
EKG T AXIS: 72 DEGREES
EKG VENTRICULAR RATE: 62 BPM

## 2025-05-15 PROCEDURE — 93010 ELECTROCARDIOGRAM REPORT: CPT | Performed by: INTERNAL MEDICINE

## 2025-05-15 NOTE — TELEPHONE ENCOUNTER
Patient notified of Dr. Begum's recommendations.  EKG was put back on schedule for 5/20/25 at 2:00 p.m.

## 2025-05-15 NOTE — TELEPHONE ENCOUNTER
Patient notified of echo results and Dr. Begum's recommendations.  Orders placed for labs.  F/U scheduled for 6/20/25 at 8:00 a.m.

## 2025-05-19 ENCOUNTER — OFFICE VISIT (OUTPATIENT)
Dept: PRIMARY CARE CLINIC | Age: 75
End: 2025-05-19
Payer: MEDICARE

## 2025-05-19 VITALS
BODY MASS INDEX: 24.3 KG/M2 | WEIGHT: 146 LBS | OXYGEN SATURATION: 95 % | TEMPERATURE: 98.1 F | RESPIRATION RATE: 16 BRPM | HEART RATE: 77 BPM

## 2025-05-19 DIAGNOSIS — J43.8 OTHER EMPHYSEMA (HCC): Chronic | ICD-10-CM

## 2025-05-19 DIAGNOSIS — J01.80 ACUTE NON-RECURRENT SINUSITIS OF OTHER SINUS: Primary | ICD-10-CM

## 2025-05-19 PROCEDURE — 1123F ACP DISCUSS/DSCN MKR DOCD: CPT | Performed by: FAMILY MEDICINE

## 2025-05-19 PROCEDURE — 99213 OFFICE O/P EST LOW 20 MIN: CPT | Performed by: FAMILY MEDICINE

## 2025-05-19 PROCEDURE — 3017F COLORECTAL CA SCREEN DOC REV: CPT | Performed by: FAMILY MEDICINE

## 2025-05-19 PROCEDURE — 1159F MED LIST DOCD IN RCRD: CPT | Performed by: FAMILY MEDICINE

## 2025-05-19 PROCEDURE — G8427 DOCREV CUR MEDS BY ELIG CLIN: HCPCS | Performed by: FAMILY MEDICINE

## 2025-05-19 PROCEDURE — 1036F TOBACCO NON-USER: CPT | Performed by: FAMILY MEDICINE

## 2025-05-19 PROCEDURE — G8420 CALC BMI NORM PARAMETERS: HCPCS | Performed by: FAMILY MEDICINE

## 2025-05-19 PROCEDURE — 3023F SPIROM DOC REV: CPT | Performed by: FAMILY MEDICINE

## 2025-05-19 RX ORDER — METHYLPREDNISOLONE 4 MG/1
TABLET ORAL
Qty: 1 KIT | Refills: 0 | Status: SHIPPED | OUTPATIENT
Start: 2025-05-19

## 2025-05-19 RX ORDER — CEFDINIR 300 MG/1
300 CAPSULE ORAL 2 TIMES DAILY
Qty: 20 CAPSULE | Refills: 0 | Status: SHIPPED | OUTPATIENT
Start: 2025-05-19 | End: 2025-05-29

## 2025-05-19 NOTE — PROGRESS NOTES
Irving Butt (:  1950) is a 74 y.o. male,    here for evaluation of the following chief complaint(s):  Congestion (X 3 months) and Follow-up ([facility])            Subjective   History of Present Illness  The patient presents for evaluation of nasal congestion and atrial fibrillation.    He has been experiencing persistent nasal congestion for the past 3 months, which has not resolved despite 2 courses of doxycycline. He has 1 remaining dose of the second course. The congestion was so severe that it prevented him from lying down, but his condition has improved over the past few nights. He has been adhering to his prescribed breathing treatments, which have resulted in some improvement. He had a consultation on 2025 and has since had 2 follow-up visits. He reports no ankle swelling. He has an upcoming appointment with Dr. Begum on 2025 and is scheduled for an EKG tomorrow. He has been monitoring his blood pressure at home several times daily, which has remained within normal limits. He has discontinued seeing Dr. Marquez and instead consults with a pulmonologist in the clinic, with the next appointment scheduled for the . He reports feeling better over the past few days and has been able to sleep on his back. He believes his lung function is satisfactory. He also reports the presence of black discharge from his nose, even though he has not been exposed to smoke or other potential irritants, except for yard work.    He underwent cardioversion twice on 2025, which was unsuccessful, leading to the initiation of amiodarone therapy. He is uncertain about his current atrial fibrillation status and will only know after tomorrow's EKG. He was previously on amiodarone following open heart surgery.    Review of Systems:  Constitutional:  No fever, no fatigue, no chills, no headaches, no weight change  Dermatology:  No rash, no mole, no dry or sensitive skin  ENT:  No cough, no sore throat, no

## 2025-05-20 ENCOUNTER — TELEPHONE (OUTPATIENT)
Dept: CARDIOLOGY CLINIC | Age: 75
End: 2025-05-20

## 2025-05-20 ENCOUNTER — CLINICAL SUPPORT (OUTPATIENT)
Dept: CARDIOLOGY CLINIC | Age: 75
End: 2025-05-20
Payer: MEDICARE

## 2025-05-20 DIAGNOSIS — I48.0 PAROXYSMAL ATRIAL FIBRILLATION (HCC): Primary | Chronic | ICD-10-CM

## 2025-05-20 PROCEDURE — 93000 ELECTROCARDIOGRAM COMPLETE: CPT | Performed by: INTERNAL MEDICINE

## 2025-05-20 NOTE — TELEPHONE ENCOUNTER
EKG done today.  S/P unsuccessful DCC and initiation of Amiodarone.  Patient leaving for [place] tomorrow, asking if he has any restrictions.

## 2025-05-20 NOTE — TELEPHONE ENCOUNTER
Per verbal from Dr. Begum, okay to check EKG at 6/20/25 F/U appt.     Patient notified of EKG results and Dr. Begum's recommendations.  He will go for labs tomorrow morning and on 6/9/25.

## 2025-05-20 NOTE — TELEPHONE ENCOUNTER
He is still in atrial fibrillation.  This is okay.  His heart rate is controlled.  Continue the medications as they were ordered.  He was also supposed to get blood work done for CMP and TSH.  It is fine to go to [place].  He should also be scheduled for an ECG and blood work after he returns.

## 2025-05-21 DIAGNOSIS — I35.1 AORTIC VALVE INSUFFICIENCY, ETIOLOGY OF CARDIAC VALVE DISEASE UNSPECIFIED: ICD-10-CM

## 2025-05-21 DIAGNOSIS — I48.0 PAROXYSMAL ATRIAL FIBRILLATION (HCC): ICD-10-CM

## 2025-05-21 DIAGNOSIS — Z95.2 S/P AVR: ICD-10-CM

## 2025-05-21 DIAGNOSIS — I34.0 MITRAL VALVE INSUFFICIENCY, UNSPECIFIED ETIOLOGY: ICD-10-CM

## 2025-05-21 DIAGNOSIS — I38 HEART VALVE DISEASE: ICD-10-CM

## 2025-05-21 DIAGNOSIS — I10 ESSENTIAL HYPERTENSION: Chronic | ICD-10-CM

## 2025-05-21 LAB
ALBUMIN: 4.1 G/DL (ref 3.5–5.2)
ALP BLD-CCNC: 69 U/L (ref 40–129)
ALT SERPL-CCNC: 32 U/L (ref 0–50)
ANION GAP SERPL CALCULATED.3IONS-SCNC: 12 MMOL/L (ref 7–16)
AST SERPL-CCNC: 32 U/L (ref 0–50)
BILIRUB SERPL-MCNC: 1 MG/DL (ref 0–1.2)
BUN BLDV-MCNC: 26 MG/DL (ref 8–23)
CALCIUM SERPL-MCNC: 10 MG/DL (ref 8.8–10.2)
CHLORIDE BLD-SCNC: 105 MMOL/L (ref 98–107)
CO2: 25 MMOL/L (ref 22–29)
CREAT SERPL-MCNC: 1.2 MG/DL (ref 0.7–1.2)
GFR, ESTIMATED: 65 ML/MIN/1.73M2
GLUCOSE BLD-MCNC: 101 MG/DL (ref 74–99)
POTASSIUM SERPL-SCNC: 4.1 MMOL/L (ref 3.5–5.1)
SODIUM BLD-SCNC: 142 MMOL/L (ref 136–145)
TOTAL PROTEIN: 6.6 G/DL (ref 6.4–8.3)
TSH SERPL DL<=0.05 MIU/L-ACNC: 1.37 UIU/ML (ref 0.27–4.2)

## 2025-06-20 ENCOUNTER — TELEPHONE (OUTPATIENT)
Dept: CARDIOLOGY CLINIC | Age: 75
End: 2025-06-20

## 2025-06-20 NOTE — TELEPHONE ENCOUNTER
Patient calling to reschedule his appt on 07/08 at 8:30 am. He will be out of town. Asking to be rescheduled in September. Please contact .

## 2025-06-26 PROCEDURE — 9900000038 HC CARDIAC REHAB PHASE 3 - MONTHLY

## 2025-06-30 ENCOUNTER — HOSPITAL ENCOUNTER (OUTPATIENT)
Dept: CARDIAC REHAB | Age: 75
Discharge: HOME | End: 2025-06-30
Payer: MEDICARE

## 2025-07-27 PROCEDURE — 9900000038 HC CARDIAC REHAB PHASE 3 - MONTHLY

## 2025-07-28 ENCOUNTER — HOSPITAL ENCOUNTER (OUTPATIENT)
Dept: CARDIAC REHAB | Age: 75
Discharge: HOME | End: 2025-07-28
Payer: MEDICARE

## 2025-08-12 DIAGNOSIS — Z12.5 PROSTATE CANCER SCREENING: ICD-10-CM

## 2025-08-12 DIAGNOSIS — E55.9 VITAMIN D DEFICIENCY: ICD-10-CM

## 2025-08-12 DIAGNOSIS — E78.2 MIXED HYPERLIPIDEMIA: ICD-10-CM

## 2025-08-12 DIAGNOSIS — I10 ESSENTIAL HYPERTENSION: ICD-10-CM

## 2025-08-12 LAB
ALBUMIN: 4.2 G/DL (ref 3.5–5.2)
ALP BLD-CCNC: 54 U/L (ref 40–129)
ALT SERPL-CCNC: 28 U/L (ref 0–50)
ANION GAP SERPL CALCULATED.3IONS-SCNC: 12 MMOL/L (ref 7–16)
AST SERPL-CCNC: 41 U/L (ref 0–50)
BILIRUB SERPL-MCNC: 2 MG/DL (ref 0–1.2)
BILIRUBIN, URINE: NEGATIVE
BUN BLDV-MCNC: 19 MG/DL (ref 8–23)
CALCIUM SERPL-MCNC: 9.7 MG/DL (ref 8.8–10.2)
CHLORIDE BLD-SCNC: 105 MMOL/L (ref 98–107)
CHOLESTEROL, TOTAL: 122 MG/DL
CO2: 23 MMOL/L (ref 22–29)
COLOR, UA: YELLOW
COMMENT: NORMAL
CREAT SERPL-MCNC: 1 MG/DL (ref 0.7–1.2)
GFR, ESTIMATED: 77 ML/MIN/1.73M2
GLUCOSE BLD-MCNC: 100 MG/DL (ref 74–99)
GLUCOSE URINE: NEGATIVE MG/DL
HCT VFR BLD CALC: 41.5 % (ref 37–54)
HDLC SERPL-MCNC: 45 MG/DL
HEMOGLOBIN: 13.6 G/DL (ref 12.5–16.5)
KETONES, URINE: NEGATIVE MG/DL
LDL CHOLESTEROL: 63 MG/DL
LEUKOCYTE ESTERASE, URINE: NEGATIVE
MCH RBC QN AUTO: 32.5 PG (ref 26–35)
MCHC RBC AUTO-ENTMCNC: 32.8 G/DL (ref 32–34.5)
MCV RBC AUTO: 99 FL (ref 80–99.9)
NITRITE, URINE: NEGATIVE
PDW BLD-RTO: 13.1 % (ref 11.5–15)
PH, URINE: 6 (ref 5–8)
PLATELET # BLD: 152 K/UL (ref 130–450)
PMV BLD AUTO: 10.6 FL (ref 7–12)
POTASSIUM SERPL-SCNC: 3.9 MMOL/L (ref 3.5–5.1)
PROSTATE SPECIFIC ANTIGEN: 1.28 NG/ML (ref 0–4)
PROTEIN UA: NEGATIVE MG/DL
RBC # BLD: 4.19 M/UL (ref 3.8–5.8)
SODIUM BLD-SCNC: 140 MMOL/L (ref 136–145)
SPECIFIC GRAVITY UA: 1.02 (ref 1–1.03)
TOTAL PROTEIN: 6.6 G/DL (ref 6.4–8.3)
TRIGL SERPL-MCNC: 71 MG/DL
TURBIDITY: CLEAR
URINE HGB: NEGATIVE
UROBILINOGEN, URINE: 0.2 EU/DL (ref 0–1)
VITAMIN D 25-HYDROXY: 26.2 NG/ML (ref 30–100)
VLDLC SERPL CALC-MCNC: 14 MG/DL
WBC # BLD: 8.3 K/UL (ref 4.5–11.5)

## 2025-08-19 ENCOUNTER — OFFICE VISIT (OUTPATIENT)
Dept: PRIMARY CARE CLINIC | Age: 75
End: 2025-08-19

## 2025-08-19 VITALS
WEIGHT: 152 LBS | RESPIRATION RATE: 16 BRPM | OXYGEN SATURATION: 96 % | BODY MASS INDEX: 25.29 KG/M2 | SYSTOLIC BLOOD PRESSURE: 128 MMHG | TEMPERATURE: 98.3 F | HEART RATE: 102 BPM | DIASTOLIC BLOOD PRESSURE: 78 MMHG

## 2025-08-19 DIAGNOSIS — E55.9 VITAMIN D DEFICIENCY: ICD-10-CM

## 2025-08-19 DIAGNOSIS — E78.2 MIXED HYPERLIPIDEMIA: ICD-10-CM

## 2025-08-19 DIAGNOSIS — E53.8 VITAMIN B 12 DEFICIENCY: ICD-10-CM

## 2025-08-19 DIAGNOSIS — R73.03 PRE-DIABETES: ICD-10-CM

## 2025-08-19 DIAGNOSIS — J43.8 OTHER EMPHYSEMA (HCC): Chronic | ICD-10-CM

## 2025-08-19 DIAGNOSIS — I25.10 ATHEROSCLEROSIS OF NATIVE CORONARY ARTERY OF NATIVE HEART WITHOUT ANGINA PECTORIS: Chronic | ICD-10-CM

## 2025-08-19 DIAGNOSIS — E03.9 ACQUIRED HYPOTHYROIDISM: ICD-10-CM

## 2025-08-19 DIAGNOSIS — I10 ESSENTIAL HYPERTENSION: Chronic | ICD-10-CM

## 2025-08-19 DIAGNOSIS — I48.0 PAROXYSMAL ATRIAL FIBRILLATION (HCC): Primary | Chronic | ICD-10-CM

## 2025-08-19 DIAGNOSIS — M05.79 RHEUMATOID ARTHRITIS INVOLVING MULTIPLE SITES WITH POSITIVE RHEUMATOID FACTOR (HCC): ICD-10-CM

## 2025-08-19 PROBLEM — R41.3 IMPAIRED MEMORY: Status: RESOLVED | Noted: 2024-02-21 | Resolved: 2025-08-19

## 2025-08-19 PROBLEM — D69.6 THROMBOCYTOPENIA, UNSPECIFIED: Status: RESOLVED | Noted: 2022-02-18 | Resolved: 2025-08-19

## 2025-08-19 PROBLEM — E11.9 DIET-CONTROLLED DIABETES MELLITUS (HCC): Status: RESOLVED | Noted: 2023-02-21 | Resolved: 2025-08-19

## 2025-08-25 ENCOUNTER — HOSPITAL ENCOUNTER (OUTPATIENT)
Dept: CARDIAC REHAB | Age: 75
Discharge: HOME | End: 2025-08-25

## 2025-08-25 PROCEDURE — 9900000038 HC CARDIAC REHAB PHASE 3 - MONTHLY

## 2025-09-02 ENCOUNTER — OFFICE VISIT (OUTPATIENT)
Dept: CARDIOLOGY CLINIC | Age: 75
End: 2025-09-02
Payer: MEDICARE

## 2025-09-02 VITALS
TEMPERATURE: 96.9 F | WEIGHT: 148.9 LBS | SYSTOLIC BLOOD PRESSURE: 138 MMHG | HEART RATE: 71 BPM | BODY MASS INDEX: 24.81 KG/M2 | RESPIRATION RATE: 18 BRPM | DIASTOLIC BLOOD PRESSURE: 62 MMHG | HEIGHT: 65 IN | OXYGEN SATURATION: 98 %

## 2025-09-02 DIAGNOSIS — I25.83 CORONARY ATHEROSCLEROSIS DUE TO LIPID RICH PLAQUE: Chronic | ICD-10-CM

## 2025-09-02 DIAGNOSIS — I48.0 PAROXYSMAL ATRIAL FIBRILLATION (HCC): Primary | ICD-10-CM

## 2025-09-02 DIAGNOSIS — I71.22 ANEURYSM OF AORTIC ARCH WITHOUT RUPTURE: ICD-10-CM

## 2025-09-02 DIAGNOSIS — I35.1 AORTIC VALVE INSUFFICIENCY, ETIOLOGY OF CARDIAC VALVE DISEASE UNSPECIFIED: ICD-10-CM

## 2025-09-02 DIAGNOSIS — I34.0 MITRAL VALVE INSUFFICIENCY, UNSPECIFIED ETIOLOGY: ICD-10-CM

## 2025-09-02 PROCEDURE — 3078F DIAST BP <80 MM HG: CPT | Performed by: INTERNAL MEDICINE

## 2025-09-02 PROCEDURE — 1159F MED LIST DOCD IN RCRD: CPT | Performed by: INTERNAL MEDICINE

## 2025-09-02 PROCEDURE — G8427 DOCREV CUR MEDS BY ELIG CLIN: HCPCS | Performed by: INTERNAL MEDICINE

## 2025-09-02 PROCEDURE — 93000 ELECTROCARDIOGRAM COMPLETE: CPT | Performed by: INTERNAL MEDICINE

## 2025-09-02 PROCEDURE — 1123F ACP DISCUSS/DSCN MKR DOCD: CPT | Performed by: INTERNAL MEDICINE

## 2025-09-02 PROCEDURE — 3075F SYST BP GE 130 - 139MM HG: CPT | Performed by: INTERNAL MEDICINE

## 2025-09-02 PROCEDURE — 1036F TOBACCO NON-USER: CPT | Performed by: INTERNAL MEDICINE

## 2025-09-02 PROCEDURE — G2211 COMPLEX E/M VISIT ADD ON: HCPCS | Performed by: INTERNAL MEDICINE

## 2025-09-02 PROCEDURE — 3017F COLORECTAL CA SCREEN DOC REV: CPT | Performed by: INTERNAL MEDICINE

## 2025-09-02 PROCEDURE — 99214 OFFICE O/P EST MOD 30 MIN: CPT | Performed by: INTERNAL MEDICINE

## 2025-09-02 PROCEDURE — G8420 CALC BMI NORM PARAMETERS: HCPCS | Performed by: INTERNAL MEDICINE

## 2025-09-02 RX ORDER — LOSARTAN POTASSIUM 50 MG/1
50 TABLET ORAL DAILY
Qty: 90 TABLET | Refills: 3 | Status: SHIPPED | OUTPATIENT
Start: 2025-09-02

## (undated) DEVICE — TOWEL,OR,DSP,ST,BLUE,STD,6/PK,12PK/CS: Brand: MEDLINE

## (undated) DEVICE — GAUZE,SPONGE,4"X4",8PLY,STRL,LF,10/TRAY: Brand: MEDLINE

## (undated) DEVICE — TRAY 0 DEG STRYKER 5MM/10MM LENS REUSABLE

## (undated) DEVICE — MEDIUM-LARGE CLIP APPLIER: Brand: ENDOWRIST

## (undated) DEVICE — DRAPE,LAP,CHOLE,W/TROUGHS,STERILE: Brand: MEDLINE

## (undated) DEVICE — APPLICATOR MEDICATED 26 CC TINT HI-LITE ORNG STRL CHLORAPREP

## (undated) DEVICE — FORCE BIPOLAR: Brand: ENDOWRIST

## (undated) DEVICE — TIP COVER ACCESSORY

## (undated) DEVICE — PROGRASP FORCEPS: Brand: ENDOWRIST

## (undated) DEVICE — SYRINGE 20ML LL S/C 50

## (undated) DEVICE — CANNULA SEAL

## (undated) DEVICE — JELLY LIDOCAINE 2% UROJET PFS 25X5ML

## (undated) DEVICE — BLADE,STAINLESS-STEEL,11,STRL,DISPOSABLE: Brand: MEDLINE

## (undated) DEVICE — CAMERA STRYKER 1488

## (undated) DEVICE — BLADELESS OBTURATOR: Brand: WECK VISTA

## (undated) DEVICE — MEDICINE CUP, GRADUATED, STER: Brand: MEDLINE

## (undated) DEVICE — LIQUIBAND RAPID ADHESIVE 36/CS 0.8ML: Brand: MEDLINE

## (undated) DEVICE — PACK PROCEDURE SURG GEN CUST

## (undated) DEVICE — ARM DRAPE

## (undated) DEVICE — NEEDLE HYPO 25GA L1.5IN BLU POLYPR HUB S STL REG BVL STR

## (undated) DEVICE — MONOPOLAR CURVED SCISSORS: Brand: ENDOWRIST

## (undated) DEVICE — SOLUTION IRRIG 1000ML STRL H2O USP PLAS POUR BTL

## (undated) DEVICE — SOLUTION IRRIG 3000ML STRL H2O USP UROMATIC PLAS CONT

## (undated) DEVICE — GLOVE ORANGE PI 7 1/2   MSG9075

## (undated) DEVICE — DOUBLE BASIN SET: Brand: MEDLINE INDUSTRIES, INC.

## (undated) DEVICE — INSUFFLATION TUBING SET WITH FILTER, FUNNEL CONNECTOR AND LUER LOCK: Brand: JOSNOE MEDICAL INC

## (undated) DEVICE — GLOVE SURG SZ 7 L12IN FNGR THK94MIL TRNSLUC YEL LTX HYDRGEL

## (undated) DEVICE — INSUFFLATION NEEDLE TO ESTABLISH PNEUMOPERITONEUM.: Brand: INSUFFLATION NEEDLE

## (undated) DEVICE — SOLUTION IRRIG 1000ML 0.9% SOD CHL USP POUR PLAS BTL

## (undated) DEVICE — SOLUTION SCRB 4OZ 4% CHG H2O AIDED FOR PREOPERATIVE SKIN

## (undated) DEVICE — WARMER SCP 2 ANTIFOG LAP DISP

## (undated) DEVICE — INSTRUMENT CLAMP TOWEL LARGE REUSABLE

## (undated) DEVICE — KIT,ANTI FOG,W/SPONGE & FLUID,SOFT PACK: Brand: MEDLINE

## (undated) DEVICE — GOWN,SIRUS,FABRNF,XL,20/CS: Brand: MEDLINE

## (undated) DEVICE — CYSTO PACK: Brand: MEDLINE INDUSTRIES, INC.

## (undated) DEVICE — CATHETER URETH 20FR BLLN 5CC STD LTX HYDRGEL 2 W F BARDX

## (undated) DEVICE — COLUMN DRAPE

## (undated) DEVICE — ANCHOR TISSUE RETRIEVAL SYSTEM, BAG SIZE 125 ML, PORT SIZE 8 MM: Brand: ANCHOR TISSUE RETRIEVAL SYSTEM

## (undated) DEVICE — ELECTRODE PT RET AD L9FT HI MOIST COND ADH HYDRGEL CORDED